# Patient Record
Sex: FEMALE | Race: WHITE | NOT HISPANIC OR LATINO | Employment: OTHER | URBAN - METROPOLITAN AREA
[De-identification: names, ages, dates, MRNs, and addresses within clinical notes are randomized per-mention and may not be internally consistent; named-entity substitution may affect disease eponyms.]

---

## 2017-01-24 ENCOUNTER — OFFICE VISIT - HEALTHEAST (OUTPATIENT)
Dept: INTERNAL MEDICINE | Facility: CLINIC | Age: 67
End: 2017-01-24

## 2017-01-24 DIAGNOSIS — E78.00 PURE HYPERCHOLESTEROLEMIA: ICD-10-CM

## 2017-01-24 DIAGNOSIS — S43.429A: ICD-10-CM

## 2017-01-24 DIAGNOSIS — G45.9 TRANSIENT CEREBRAL ISCHEMIA: ICD-10-CM

## 2017-01-24 LAB
CHOLEST SERPL-MCNC: 229 MG/DL
FASTING STATUS PATIENT QL REPORTED: NO
HDLC SERPL-MCNC: 81 MG/DL
LDLC SERPL CALC-MCNC: 133 MG/DL
TRIGL SERPL-MCNC: 77 MG/DL

## 2017-01-27 ENCOUNTER — OFFICE VISIT - HEALTHEAST (OUTPATIENT)
Dept: PHYSICAL THERAPY | Facility: REHABILITATION | Age: 67
End: 2017-01-27

## 2017-01-27 DIAGNOSIS — G89.29 CHRONIC LEFT SHOULDER PAIN: ICD-10-CM

## 2017-01-27 DIAGNOSIS — R29.3 POOR POSTURE: ICD-10-CM

## 2017-01-27 DIAGNOSIS — R29.898 SHOULDER WEAKNESS: ICD-10-CM

## 2017-01-27 DIAGNOSIS — M25.512 CHRONIC LEFT SHOULDER PAIN: ICD-10-CM

## 2017-01-27 DIAGNOSIS — M25.512 LEFT SHOULDER PAIN: ICD-10-CM

## 2017-01-30 ENCOUNTER — COMMUNICATION - HEALTHEAST (OUTPATIENT)
Dept: INTERNAL MEDICINE | Facility: CLINIC | Age: 67
End: 2017-01-30

## 2017-02-07 ENCOUNTER — OFFICE VISIT - HEALTHEAST (OUTPATIENT)
Dept: PHYSICAL THERAPY | Facility: REHABILITATION | Age: 67
End: 2017-02-07

## 2017-02-07 DIAGNOSIS — R29.3 POOR POSTURE: ICD-10-CM

## 2017-02-07 DIAGNOSIS — G89.29 CHRONIC LEFT SHOULDER PAIN: ICD-10-CM

## 2017-02-07 DIAGNOSIS — M25.512 LEFT SHOULDER PAIN: ICD-10-CM

## 2017-02-07 DIAGNOSIS — M25.512 CHRONIC LEFT SHOULDER PAIN: ICD-10-CM

## 2017-02-07 DIAGNOSIS — R29.898 SHOULDER WEAKNESS: ICD-10-CM

## 2017-02-07 DIAGNOSIS — M25.512 ACUTE PAIN OF LEFT SHOULDER: ICD-10-CM

## 2017-03-16 ENCOUNTER — OFFICE VISIT - HEALTHEAST (OUTPATIENT)
Dept: INTERNAL MEDICINE | Facility: CLINIC | Age: 67
End: 2017-03-16

## 2017-03-16 DIAGNOSIS — D75.89 MACROCYTOSIS WITHOUT ANEMIA: ICD-10-CM

## 2017-03-16 DIAGNOSIS — E78.00 PURE HYPERCHOLESTEROLEMIA: ICD-10-CM

## 2017-03-16 DIAGNOSIS — Z00.00 ROUTINE GENERAL MEDICAL EXAMINATION AT A HEALTH CARE FACILITY: ICD-10-CM

## 2017-03-16 DIAGNOSIS — G45.9 TRANSIENT CEREBRAL ISCHEMIA: ICD-10-CM

## 2017-03-16 ASSESSMENT — MIFFLIN-ST. JEOR: SCORE: 1279.48

## 2017-03-17 LAB
BASOPHILS # BLD AUTO: 0 THOU/UL (ref 0–0.2)
BASOPHILS NFR BLD AUTO: 1 % (ref 0–2)
EOSINOPHIL # BLD AUTO: 0.1 THOU/UL (ref 0–0.4)
EOSINOPHIL NFR BLD AUTO: 1 % (ref 0–6)
ERYTHROCYTE [DISTWIDTH] IN BLOOD BY AUTOMATED COUNT: 13.2 % (ref 11–14.5)
HCT VFR BLD AUTO: 42.9 % (ref 35–47)
HGB BLD-MCNC: 14 G/DL (ref 12–16)
LAB AP CHARGES (HE HISTORICAL CONVERSION): NORMAL
LYMPHOCYTES # BLD AUTO: 1.5 THOU/UL (ref 0.8–4.4)
LYMPHOCYTES NFR BLD AUTO: 25 % (ref 20–40)
MCH RBC QN AUTO: 34.8 PG (ref 27–34)
MCHC RBC AUTO-ENTMCNC: 32.6 G/DL (ref 32–36)
MCV RBC AUTO: 107 FL (ref 80–100)
MONOCYTES # BLD AUTO: 0.6 THOU/UL (ref 0–0.9)
MONOCYTES NFR BLD AUTO: 10 % (ref 2–10)
NEUTROPHILS # BLD AUTO: 3.7 THOU/UL (ref 2–7.7)
NEUTROPHILS NFR BLD AUTO: 63 % (ref 50–70)
PATH REPORT.COMMENTS IMP SPEC: NORMAL
PATH REPORT.COMMENTS IMP SPEC: NORMAL
PATH REPORT.FINAL DX SPEC: NORMAL
PATH REPORT.MICROSCOPIC SPEC OTHER STN: ABNORMAL
PATH REPORT.MICROSCOPIC SPEC OTHER STN: NORMAL
PATH REPORT.RELEVANT HX SPEC: NORMAL
PLATELET # BLD AUTO: 205 THOU/UL (ref 140–440)
PMV BLD AUTO: 11.6 FL (ref 8.5–12.5)
RBC # BLD AUTO: 4.02 MILL/UL (ref 3.8–5.4)
WBC: 5.9 THOU/UL (ref 4–11)

## 2017-03-20 LAB
ALBUMIN PERCENT: 63.6 % (ref 51–67)
ALBUMIN SERPL ELPH-MCNC: 5.1 G/DL (ref 3.2–4.7)
ALPHA 1 PERCENT: 2.1 % (ref 2–4)
ALPHA 2 PERCENT: 10.7 % (ref 5–13)
ALPHA1 GLOB SERPL ELPH-MCNC: 0.2 G/DL (ref 0.1–0.3)
ALPHA2 GLOB SERPL ELPH-MCNC: 0.9 G/DL (ref 0.4–0.9)
B-GLOBULIN SERPL ELPH-MCNC: 0.9 G/DL (ref 0.7–1.2)
BETA PERCENT: 11.2 % (ref 10–17)
GAMMA GLOB SERPL ELPH-MCNC: 1 G/DL (ref 0.6–1.4)
GAMMA GLOBULIN PERCENT: 12.4 % (ref 9–20)
PATH ICD:: ABNORMAL
PATH ICD:: NORMAL
PROT PATTERN SERPL ELPH-IMP: ABNORMAL
PROT PATTERN SERPL IFE-IMP: NORMAL
PROT SERPL-MCNC: 8 G/DL (ref 6–8)
REVIEWING PATHOLOGIST: ABNORMAL
REVIEWING PATHOLOGIST: NORMAL

## 2017-03-27 ENCOUNTER — COMMUNICATION - HEALTHEAST (OUTPATIENT)
Dept: ONCOLOGY | Facility: HOSPITAL | Age: 67
End: 2017-03-27

## 2017-03-27 ENCOUNTER — AMBULATORY - HEALTHEAST (OUTPATIENT)
Dept: INTERNAL MEDICINE | Facility: CLINIC | Age: 67
End: 2017-03-27

## 2017-03-27 DIAGNOSIS — D75.89 MACROCYTOSIS: ICD-10-CM

## 2017-04-04 ENCOUNTER — OFFICE VISIT - HEALTHEAST (OUTPATIENT)
Dept: ONCOLOGY | Facility: CLINIC | Age: 67
End: 2017-04-04

## 2017-04-04 DIAGNOSIS — D75.89 MACROCYTOSIS WITHOUT ANEMIA: ICD-10-CM

## 2017-04-04 DIAGNOSIS — R71.8 OTHER ABNORMALITY OF RED BLOOD CELLS: ICD-10-CM

## 2017-04-04 DIAGNOSIS — E03.9 HYPOTHYROIDISM, UNSPECIFIED TYPE: ICD-10-CM

## 2017-04-04 ASSESSMENT — MIFFLIN-ST. JEOR: SCORE: 1279.48

## 2017-04-06 LAB
KAPPA FREE LIGHT CHAIN, S - HISTORICAL: 1.75 MG/DL (ref 0.33–1.94)
KAPPA/LAMBDA FLC RATIO - HISTORICAL: 1.32 (ref 0.26–1.65)
LAMBDA FREE LIGHT CHAIN, S - HISTORICAL: 1.33 MG/DL (ref 0.57–2.63)

## 2017-04-12 ENCOUNTER — COMMUNICATION - HEALTHEAST (OUTPATIENT)
Dept: ONCOLOGY | Facility: CLINIC | Age: 67
End: 2017-04-12

## 2017-06-20 ENCOUNTER — RECORDS - HEALTHEAST (OUTPATIENT)
Dept: ADMINISTRATIVE | Facility: OTHER | Age: 67
End: 2017-06-20

## 2017-08-22 ENCOUNTER — RECORDS - HEALTHEAST (OUTPATIENT)
Dept: ADMINISTRATIVE | Facility: OTHER | Age: 67
End: 2017-08-22

## 2017-09-07 ENCOUNTER — RECORDS - HEALTHEAST (OUTPATIENT)
Dept: BONE DENSITY | Facility: CLINIC | Age: 67
End: 2017-09-07

## 2017-09-07 ENCOUNTER — RECORDS - HEALTHEAST (OUTPATIENT)
Dept: ADMINISTRATIVE | Facility: OTHER | Age: 67
End: 2017-09-07

## 2017-09-07 DIAGNOSIS — M85.80 OTHER SPECIFIED DISORDERS OF BONE DENSITY AND STRUCTURE, UNSPECIFIED SITE: ICD-10-CM

## 2017-09-07 DIAGNOSIS — Z78.0 ASYMPTOMATIC MENOPAUSAL STATE: ICD-10-CM

## 2017-09-24 ENCOUNTER — RECORDS - HEALTHEAST (OUTPATIENT)
Dept: ADMINISTRATIVE | Facility: OTHER | Age: 67
End: 2017-09-24

## 2017-11-07 ENCOUNTER — RECORDS - HEALTHEAST (OUTPATIENT)
Dept: ADMINISTRATIVE | Facility: OTHER | Age: 67
End: 2017-11-07

## 2018-02-01 ENCOUNTER — AMBULATORY - HEALTHEAST (OUTPATIENT)
Dept: NURSING | Facility: CLINIC | Age: 68
End: 2018-02-01

## 2018-05-17 ENCOUNTER — COMMUNICATION - HEALTHEAST (OUTPATIENT)
Dept: INTERNAL MEDICINE | Facility: CLINIC | Age: 68
End: 2018-05-17

## 2018-06-05 ENCOUNTER — COMMUNICATION - HEALTHEAST (OUTPATIENT)
Dept: INTERNAL MEDICINE | Facility: CLINIC | Age: 68
End: 2018-06-05

## 2018-07-05 ENCOUNTER — OFFICE VISIT - HEALTHEAST (OUTPATIENT)
Dept: INTERNAL MEDICINE | Facility: CLINIC | Age: 68
End: 2018-07-05

## 2018-07-05 DIAGNOSIS — E78.00 PURE HYPERCHOLESTEROLEMIA: ICD-10-CM

## 2018-07-05 DIAGNOSIS — G45.9 TRANSIENT CEREBRAL ISCHEMIA: ICD-10-CM

## 2018-07-05 DIAGNOSIS — Z00.00 ROUTINE GENERAL MEDICAL EXAMINATION AT A HEALTH CARE FACILITY: ICD-10-CM

## 2018-07-05 DIAGNOSIS — R03.0 ELEVATED BLOOD PRESSURE READING WITHOUT DIAGNOSIS OF HYPERTENSION: ICD-10-CM

## 2018-07-05 DIAGNOSIS — D75.89 MACROCYTOSIS WITHOUT ANEMIA: ICD-10-CM

## 2018-07-05 LAB
ALBUMIN SERPL-MCNC: 4.3 G/DL (ref 3.5–5)
ALP SERPL-CCNC: 56 U/L (ref 45–120)
ALT SERPL W P-5'-P-CCNC: 23 U/L (ref 0–45)
ANION GAP SERPL CALCULATED.3IONS-SCNC: 10 MMOL/L (ref 5–18)
AST SERPL W P-5'-P-CCNC: 31 U/L (ref 0–40)
BILIRUB SERPL-MCNC: 0.8 MG/DL (ref 0–1)
BUN SERPL-MCNC: 13 MG/DL (ref 8–28)
CALCIUM SERPL-MCNC: 9.7 MG/DL (ref 8.5–10.5)
CHLORIDE BLD-SCNC: 106 MMOL/L (ref 98–107)
CHOLEST SERPL-MCNC: 190 MG/DL
CO2 SERPL-SCNC: 27 MMOL/L (ref 22–31)
CREAT SERPL-MCNC: 0.7 MG/DL (ref 0.6–1.1)
ERYTHROCYTE [DISTWIDTH] IN BLOOD BY AUTOMATED COUNT: 11.3 % (ref 11–14.5)
FASTING STATUS PATIENT QL REPORTED: YES
GFR SERPL CREATININE-BSD FRML MDRD: >60 ML/MIN/1.73M2
GLUCOSE BLD-MCNC: 105 MG/DL (ref 70–125)
HCT VFR BLD AUTO: 43.4 % (ref 35–47)
HDLC SERPL-MCNC: 71 MG/DL
HGB BLD-MCNC: 14.2 G/DL (ref 12–16)
LDLC SERPL CALC-MCNC: 102 MG/DL
MCH RBC QN AUTO: 33.7 PG (ref 27–34)
MCHC RBC AUTO-ENTMCNC: 32.7 G/DL (ref 32–36)
MCV RBC AUTO: 103 FL (ref 80–100)
PLATELET # BLD AUTO: 185 THOU/UL (ref 140–440)
PMV BLD AUTO: 8.5 FL (ref 7–10)
POTASSIUM BLD-SCNC: 4.2 MMOL/L (ref 3.5–5)
PROT SERPL-MCNC: 7.6 G/DL (ref 6–8)
RBC # BLD AUTO: 4.22 MILL/UL (ref 3.8–5.4)
SODIUM SERPL-SCNC: 143 MMOL/L (ref 136–145)
TRIGL SERPL-MCNC: 84 MG/DL
TSH SERPL DL<=0.005 MIU/L-ACNC: 2 UIU/ML (ref 0.3–5)
WBC: 6.2 THOU/UL (ref 4–11)

## 2018-07-05 ASSESSMENT — MIFFLIN-ST. JEOR: SCORE: 1300.46

## 2018-07-09 ENCOUNTER — COMMUNICATION - HEALTHEAST (OUTPATIENT)
Dept: INTERNAL MEDICINE | Facility: CLINIC | Age: 68
End: 2018-07-09

## 2018-09-11 ENCOUNTER — OFFICE VISIT - HEALTHEAST (OUTPATIENT)
Dept: INTERNAL MEDICINE | Facility: CLINIC | Age: 68
End: 2018-09-11

## 2018-09-11 DIAGNOSIS — I10 HTN (HYPERTENSION): ICD-10-CM

## 2018-09-27 ENCOUNTER — OFFICE VISIT - HEALTHEAST (OUTPATIENT)
Dept: INTERNAL MEDICINE | Facility: CLINIC | Age: 68
End: 2018-09-27

## 2018-09-27 DIAGNOSIS — I10 HTN (HYPERTENSION): ICD-10-CM

## 2018-09-27 LAB
ANION GAP SERPL CALCULATED.3IONS-SCNC: 12 MMOL/L (ref 5–18)
BUN SERPL-MCNC: 13 MG/DL (ref 8–28)
CALCIUM SERPL-MCNC: 10.8 MG/DL (ref 8.5–10.5)
CHLORIDE BLD-SCNC: 96 MMOL/L (ref 98–107)
CO2 SERPL-SCNC: 32 MMOL/L (ref 22–31)
CREAT SERPL-MCNC: 0.78 MG/DL (ref 0.6–1.1)
GFR SERPL CREATININE-BSD FRML MDRD: >60 ML/MIN/1.73M2
GLUCOSE BLD-MCNC: 108 MG/DL (ref 70–125)
POTASSIUM BLD-SCNC: 3.3 MMOL/L (ref 3.5–5)
SODIUM SERPL-SCNC: 140 MMOL/L (ref 136–145)

## 2018-09-27 ASSESSMENT — MIFFLIN-ST. JEOR: SCORE: 1272.34

## 2018-09-28 ENCOUNTER — COMMUNICATION - HEALTHEAST (OUTPATIENT)
Dept: INTERNAL MEDICINE | Facility: CLINIC | Age: 68
End: 2018-09-28

## 2018-10-09 ENCOUNTER — AMBULATORY - HEALTHEAST (OUTPATIENT)
Dept: INTERNAL MEDICINE | Facility: CLINIC | Age: 68
End: 2018-10-09

## 2018-10-09 ENCOUNTER — COMMUNICATION - HEALTHEAST (OUTPATIENT)
Dept: LAB | Facility: CLINIC | Age: 68
End: 2018-10-09

## 2018-10-09 DIAGNOSIS — I10 HTN (HYPERTENSION): ICD-10-CM

## 2018-10-16 ENCOUNTER — OFFICE VISIT - HEALTHEAST (OUTPATIENT)
Dept: INTERNAL MEDICINE | Facility: CLINIC | Age: 68
End: 2018-10-16

## 2018-10-16 ENCOUNTER — AMBULATORY - HEALTHEAST (OUTPATIENT)
Dept: NURSING | Facility: CLINIC | Age: 68
End: 2018-10-16

## 2018-10-16 DIAGNOSIS — I10 HTN (HYPERTENSION): ICD-10-CM

## 2018-10-16 LAB
ANION GAP SERPL CALCULATED.3IONS-SCNC: 10 MMOL/L (ref 5–18)
BUN SERPL-MCNC: 13 MG/DL (ref 8–28)
CALCIUM SERPL-MCNC: 10.1 MG/DL (ref 8.5–10.5)
CHLORIDE BLD-SCNC: 103 MMOL/L (ref 98–107)
CO2 SERPL-SCNC: 28 MMOL/L (ref 22–31)
CREAT SERPL-MCNC: 0.79 MG/DL (ref 0.6–1.1)
GFR SERPL CREATININE-BSD FRML MDRD: >60 ML/MIN/1.73M2
GLUCOSE BLD-MCNC: 90 MG/DL (ref 70–125)
POTASSIUM BLD-SCNC: 4.2 MMOL/L (ref 3.5–5)
SODIUM SERPL-SCNC: 141 MMOL/L (ref 136–145)

## 2018-10-30 ENCOUNTER — OFFICE VISIT - HEALTHEAST (OUTPATIENT)
Dept: INTERNAL MEDICINE | Facility: CLINIC | Age: 68
End: 2018-10-30

## 2018-10-30 DIAGNOSIS — I10 HTN (HYPERTENSION): ICD-10-CM

## 2018-11-13 ENCOUNTER — OFFICE VISIT - HEALTHEAST (OUTPATIENT)
Dept: INTERNAL MEDICINE | Facility: CLINIC | Age: 68
End: 2018-11-13

## 2018-11-13 DIAGNOSIS — I10 ESSENTIAL HYPERTENSION: ICD-10-CM

## 2018-12-12 ENCOUNTER — COMMUNICATION - HEALTHEAST (OUTPATIENT)
Dept: INTERNAL MEDICINE | Facility: CLINIC | Age: 68
End: 2018-12-12

## 2018-12-12 ENCOUNTER — OFFICE VISIT - HEALTHEAST (OUTPATIENT)
Dept: INTERNAL MEDICINE | Facility: CLINIC | Age: 68
End: 2018-12-12

## 2018-12-12 DIAGNOSIS — I10 BENIGN ESSENTIAL HYPERTENSION: ICD-10-CM

## 2018-12-12 LAB
ANION GAP SERPL CALCULATED.3IONS-SCNC: 9 MMOL/L (ref 5–18)
BUN SERPL-MCNC: 18 MG/DL (ref 8–28)
CALCIUM SERPL-MCNC: 10.4 MG/DL (ref 8.5–10.5)
CHLORIDE BLD-SCNC: 105 MMOL/L (ref 98–107)
CO2 SERPL-SCNC: 29 MMOL/L (ref 22–31)
CREAT SERPL-MCNC: 0.81 MG/DL (ref 0.6–1.1)
GFR SERPL CREATININE-BSD FRML MDRD: >60 ML/MIN/1.73M2
GLUCOSE BLD-MCNC: 104 MG/DL (ref 70–125)
POTASSIUM BLD-SCNC: 4.3 MMOL/L (ref 3.5–5)
SODIUM SERPL-SCNC: 143 MMOL/L (ref 136–145)

## 2019-03-07 ENCOUNTER — OFFICE VISIT - HEALTHEAST (OUTPATIENT)
Dept: INTERNAL MEDICINE | Facility: CLINIC | Age: 69
End: 2019-03-07

## 2019-03-07 DIAGNOSIS — I10 ESSENTIAL HYPERTENSION: ICD-10-CM

## 2019-04-30 ENCOUNTER — COMMUNICATION - HEALTHEAST (OUTPATIENT)
Dept: INTERNAL MEDICINE | Facility: CLINIC | Age: 69
End: 2019-04-30

## 2019-04-30 DIAGNOSIS — I10 ESSENTIAL HYPERTENSION: ICD-10-CM

## 2019-05-13 ENCOUNTER — OFFICE VISIT - HEALTHEAST (OUTPATIENT)
Dept: INTERNAL MEDICINE | Facility: CLINIC | Age: 69
End: 2019-05-13

## 2019-05-13 DIAGNOSIS — R05.9 COUGH: ICD-10-CM

## 2019-05-13 DIAGNOSIS — F32.A DEPRESSION, UNSPECIFIED DEPRESSION TYPE: ICD-10-CM

## 2019-05-13 DIAGNOSIS — Z12.31 VISIT FOR SCREENING MAMMOGRAM: ICD-10-CM

## 2019-05-13 DIAGNOSIS — I10 ESSENTIAL HYPERTENSION: ICD-10-CM

## 2019-06-01 ENCOUNTER — AMBULATORY - HEALTHEAST (OUTPATIENT)
Dept: MULTI SPECIALTY CLINIC | Facility: CLINIC | Age: 69
End: 2019-06-01

## 2019-07-01 ENCOUNTER — COMMUNICATION - HEALTHEAST (OUTPATIENT)
Dept: INTERNAL MEDICINE | Facility: CLINIC | Age: 69
End: 2019-07-01

## 2019-07-01 DIAGNOSIS — E78.00 PURE HYPERCHOLESTEROLEMIA: ICD-10-CM

## 2019-07-16 ENCOUNTER — COMMUNICATION - HEALTHEAST (OUTPATIENT)
Dept: TELEHEALTH | Facility: CLINIC | Age: 69
End: 2019-07-16

## 2019-07-16 ENCOUNTER — OFFICE VISIT - HEALTHEAST (OUTPATIENT)
Dept: INTERNAL MEDICINE | Facility: CLINIC | Age: 69
End: 2019-07-16

## 2019-07-16 DIAGNOSIS — R45.89 DEPRESSED MOOD: ICD-10-CM

## 2019-07-16 DIAGNOSIS — I10 ESSENTIAL HYPERTENSION: ICD-10-CM

## 2019-07-16 LAB
ANION GAP SERPL CALCULATED.3IONS-SCNC: 6 MMOL/L (ref 5–18)
BUN SERPL-MCNC: 15 MG/DL (ref 8–28)
CALCIUM SERPL-MCNC: 10.2 MG/DL (ref 8.5–10.5)
CHLORIDE BLD-SCNC: 105 MMOL/L (ref 98–107)
CO2 SERPL-SCNC: 30 MMOL/L (ref 22–31)
CREAT SERPL-MCNC: 0.83 MG/DL (ref 0.6–1.1)
GFR SERPL CREATININE-BSD FRML MDRD: >60 ML/MIN/1.73M2
GLUCOSE BLD-MCNC: 102 MG/DL (ref 70–125)
POTASSIUM BLD-SCNC: 4.4 MMOL/L (ref 3.5–5)
SODIUM SERPL-SCNC: 141 MMOL/L (ref 136–145)

## 2019-07-17 ENCOUNTER — COMMUNICATION - HEALTHEAST (OUTPATIENT)
Dept: INTERNAL MEDICINE | Facility: CLINIC | Age: 69
End: 2019-07-17

## 2019-08-11 ENCOUNTER — COMMUNICATION - HEALTHEAST (OUTPATIENT)
Dept: INTERNAL MEDICINE | Facility: CLINIC | Age: 69
End: 2019-08-11

## 2019-08-11 DIAGNOSIS — I10 ESSENTIAL HYPERTENSION: ICD-10-CM

## 2019-09-12 ENCOUNTER — COMMUNICATION - HEALTHEAST (OUTPATIENT)
Dept: INTERNAL MEDICINE | Facility: CLINIC | Age: 69
End: 2019-09-12

## 2019-09-12 DIAGNOSIS — I10 ESSENTIAL HYPERTENSION: ICD-10-CM

## 2019-09-24 ENCOUNTER — COMMUNICATION - HEALTHEAST (OUTPATIENT)
Dept: INTERNAL MEDICINE | Facility: CLINIC | Age: 69
End: 2019-09-24

## 2019-09-24 DIAGNOSIS — E78.00 PURE HYPERCHOLESTEROLEMIA: ICD-10-CM

## 2019-11-07 ENCOUNTER — RECORDS - HEALTHEAST (OUTPATIENT)
Dept: ADMINISTRATIVE | Facility: OTHER | Age: 69
End: 2019-11-07

## 2019-11-19 ENCOUNTER — OFFICE VISIT - HEALTHEAST (OUTPATIENT)
Dept: INTERNAL MEDICINE | Facility: CLINIC | Age: 69
End: 2019-11-19

## 2019-11-19 DIAGNOSIS — Z51.81 ENCOUNTER FOR THERAPEUTIC DRUG MONITORING: ICD-10-CM

## 2019-11-19 DIAGNOSIS — I10 ESSENTIAL HYPERTENSION: ICD-10-CM

## 2019-11-19 DIAGNOSIS — Z23 NEED FOR IMMUNIZATION AGAINST INFLUENZA: ICD-10-CM

## 2019-11-19 DIAGNOSIS — I65.21 ASYMPTOMATIC CAROTID ARTERY STENOSIS, RIGHT: ICD-10-CM

## 2019-11-19 DIAGNOSIS — D75.89 MACROCYTOSIS WITHOUT ANEMIA: ICD-10-CM

## 2019-11-19 DIAGNOSIS — E78.00 PURE HYPERCHOLESTEROLEMIA: ICD-10-CM

## 2019-11-19 LAB
ALBUMIN SERPL-MCNC: 4.4 G/DL (ref 3.5–5)
ALP SERPL-CCNC: 66 U/L (ref 45–120)
ALT SERPL W P-5'-P-CCNC: 29 U/L (ref 0–45)
ANION GAP SERPL CALCULATED.3IONS-SCNC: 9 MMOL/L (ref 5–18)
AST SERPL W P-5'-P-CCNC: 29 U/L (ref 0–40)
BASOPHILS # BLD AUTO: 0 THOU/UL (ref 0–0.2)
BASOPHILS NFR BLD AUTO: 0 % (ref 0–2)
BILIRUB SERPL-MCNC: 0.5 MG/DL (ref 0–1)
BUN SERPL-MCNC: 17 MG/DL (ref 8–28)
CALCIUM SERPL-MCNC: 10.4 MG/DL (ref 8.5–10.5)
CHLORIDE BLD-SCNC: 103 MMOL/L (ref 98–107)
CHOLEST SERPL-MCNC: 202 MG/DL
CO2 SERPL-SCNC: 29 MMOL/L (ref 22–31)
CREAT SERPL-MCNC: 0.84 MG/DL (ref 0.6–1.1)
EOSINOPHIL # BLD AUTO: 0.2 THOU/UL (ref 0–0.4)
EOSINOPHIL NFR BLD AUTO: 2 % (ref 0–6)
ERYTHROCYTE [DISTWIDTH] IN BLOOD BY AUTOMATED COUNT: 11.3 % (ref 11–14.5)
FASTING STATUS PATIENT QL REPORTED: NO
GFR SERPL CREATININE-BSD FRML MDRD: >60 ML/MIN/1.73M2
GLUCOSE BLD-MCNC: 103 MG/DL (ref 70–125)
HCT VFR BLD AUTO: 41 % (ref 35–47)
HDLC SERPL-MCNC: 74 MG/DL
HGB BLD-MCNC: 13.5 G/DL (ref 12–16)
LDLC SERPL CALC-MCNC: 103 MG/DL
LYMPHOCYTES # BLD AUTO: 1.8 THOU/UL (ref 0.8–4.4)
LYMPHOCYTES NFR BLD AUTO: 25 % (ref 20–40)
MCH RBC QN AUTO: 34 PG (ref 27–34)
MCHC RBC AUTO-ENTMCNC: 33 G/DL (ref 32–36)
MCV RBC AUTO: 103 FL (ref 80–100)
MONOCYTES # BLD AUTO: 0.7 THOU/UL (ref 0–0.9)
MONOCYTES NFR BLD AUTO: 9 % (ref 2–10)
NEUTROPHILS # BLD AUTO: 4.7 THOU/UL (ref 2–7.7)
NEUTROPHILS NFR BLD AUTO: 63 % (ref 50–70)
PLATELET # BLD AUTO: 225 THOU/UL (ref 140–440)
PMV BLD AUTO: 7.9 FL (ref 7–10)
POTASSIUM BLD-SCNC: 4.2 MMOL/L (ref 3.5–5)
PROT SERPL-MCNC: 7.7 G/DL (ref 6–8)
RBC # BLD AUTO: 3.98 MILL/UL (ref 3.8–5.4)
SODIUM SERPL-SCNC: 141 MMOL/L (ref 136–145)
TRIGL SERPL-MCNC: 127 MG/DL
WBC: 7.4 THOU/UL (ref 4–11)

## 2019-11-20 ENCOUNTER — COMMUNICATION - HEALTHEAST (OUTPATIENT)
Dept: INTERNAL MEDICINE | Facility: CLINIC | Age: 69
End: 2019-11-20

## 2019-12-04 ENCOUNTER — COMMUNICATION - HEALTHEAST (OUTPATIENT)
Dept: INTERNAL MEDICINE | Facility: CLINIC | Age: 69
End: 2019-12-04

## 2019-12-04 ENCOUNTER — HOSPITAL ENCOUNTER (OUTPATIENT)
Dept: ULTRASOUND IMAGING | Facility: CLINIC | Age: 69
Discharge: HOME | End: 2019-12-04
Attending: INTERNAL MEDICINE

## 2019-12-04 ENCOUNTER — HOSPITAL ENCOUNTER (OUTPATIENT)
Dept: CT IMAGING | Facility: CLINIC | Age: 69
Discharge: HOME | End: 2019-12-04
Attending: INTERNAL MEDICINE

## 2019-12-04 DIAGNOSIS — E78.00 PURE HYPERCHOLESTEROLEMIA: ICD-10-CM

## 2019-12-04 DIAGNOSIS — I65.21 ASYMPTOMATIC CAROTID ARTERY STENOSIS, RIGHT: ICD-10-CM

## 2019-12-04 LAB
CV CALCIUM SCORE AGATSTON LM: 0
CV CALCIUM SCORING AGATSON LAD: 198
CV CALCIUM SCORING AGATSTON CX: 57
CV CALCIUM SCORING AGATSTON RCA: 0
CV CALCIUM SCORING AGATSTON TOTAL: 255

## 2020-01-02 ENCOUNTER — COMMUNICATION - HEALTHEAST (OUTPATIENT)
Dept: INTERNAL MEDICINE | Facility: CLINIC | Age: 70
End: 2020-01-02

## 2020-01-02 DIAGNOSIS — E78.00 PURE HYPERCHOLESTEROLEMIA: ICD-10-CM

## 2020-01-23 ENCOUNTER — COMMUNICATION - HEALTHEAST (OUTPATIENT)
Dept: INTERNAL MEDICINE | Facility: CLINIC | Age: 70
End: 2020-01-23

## 2020-01-23 DIAGNOSIS — I10 ESSENTIAL HYPERTENSION: ICD-10-CM

## 2020-09-04 ENCOUNTER — COMMUNICATION - HEALTHEAST (OUTPATIENT)
Dept: INTERNAL MEDICINE | Facility: CLINIC | Age: 70
End: 2020-09-04

## 2020-09-04 DIAGNOSIS — I10 ESSENTIAL HYPERTENSION: ICD-10-CM

## 2020-10-02 ENCOUNTER — COMMUNICATION - HEALTHEAST (OUTPATIENT)
Dept: INTERNAL MEDICINE | Facility: CLINIC | Age: 70
End: 2020-10-02

## 2020-10-02 DIAGNOSIS — I10 ESSENTIAL HYPERTENSION: ICD-10-CM

## 2020-12-30 ENCOUNTER — COMMUNICATION - HEALTHEAST (OUTPATIENT)
Dept: INTERNAL MEDICINE | Facility: CLINIC | Age: 70
End: 2020-12-30

## 2020-12-30 DIAGNOSIS — E78.00 PURE HYPERCHOLESTEROLEMIA: ICD-10-CM

## 2021-01-05 ENCOUNTER — COMMUNICATION - HEALTHEAST (OUTPATIENT)
Dept: INTERNAL MEDICINE | Facility: CLINIC | Age: 71
End: 2021-01-05

## 2021-01-05 ENCOUNTER — OFFICE VISIT - HEALTHEAST (OUTPATIENT)
Dept: FAMILY MEDICINE | Facility: CLINIC | Age: 71
End: 2021-01-05

## 2021-01-05 ENCOUNTER — COMMUNICATION - HEALTHEAST (OUTPATIENT)
Dept: FAMILY MEDICINE | Facility: CLINIC | Age: 71
End: 2021-01-05

## 2021-01-05 DIAGNOSIS — I10 ESSENTIAL HYPERTENSION: ICD-10-CM

## 2021-01-05 DIAGNOSIS — Z23 ENCOUNTER FOR IMMUNIZATION: ICD-10-CM

## 2021-01-05 DIAGNOSIS — E78.00 PURE HYPERCHOLESTEROLEMIA: ICD-10-CM

## 2021-01-05 LAB
ANION GAP SERPL CALCULATED.3IONS-SCNC: 11 MMOL/L (ref 5–18)
BUN SERPL-MCNC: 19 MG/DL (ref 8–28)
CALCIUM SERPL-MCNC: 10.1 MG/DL (ref 8.5–10.5)
CHLORIDE BLD-SCNC: 103 MMOL/L (ref 98–107)
CHOLEST SERPL-MCNC: 204 MG/DL
CO2 SERPL-SCNC: 29 MMOL/L (ref 22–31)
CREAT SERPL-MCNC: 0.81 MG/DL (ref 0.6–1.1)
ERYTHROCYTE [DISTWIDTH] IN BLOOD BY AUTOMATED COUNT: 10.6 % (ref 11–14.5)
FASTING STATUS PATIENT QL REPORTED: YES
GFR SERPL CREATININE-BSD FRML MDRD: >60 ML/MIN/1.73M2
GLUCOSE BLD-MCNC: 100 MG/DL (ref 70–125)
HCT VFR BLD AUTO: 42.6 % (ref 35–47)
HDLC SERPL-MCNC: 77 MG/DL
HGB BLD-MCNC: 14.1 G/DL (ref 12–16)
LDLC SERPL CALC-MCNC: 100 MG/DL
MCH RBC QN AUTO: 34.3 PG (ref 27–34)
MCHC RBC AUTO-ENTMCNC: 33.1 G/DL (ref 32–36)
MCV RBC AUTO: 104 FL (ref 80–100)
PLATELET # BLD AUTO: 220 THOU/UL (ref 140–440)
PMV BLD AUTO: 8.1 FL (ref 7–10)
POTASSIUM BLD-SCNC: 4.2 MMOL/L (ref 3.5–5)
RBC # BLD AUTO: 4.11 MILL/UL (ref 3.8–5.4)
SODIUM SERPL-SCNC: 143 MMOL/L (ref 136–145)
TRIGL SERPL-MCNC: 134 MG/DL
WBC: 6.3 THOU/UL (ref 4–11)

## 2021-01-05 ASSESSMENT — MIFFLIN-ST. JEOR: SCORE: 1327.11

## 2021-01-26 ENCOUNTER — OFFICE VISIT - HEALTHEAST (OUTPATIENT)
Dept: FAMILY MEDICINE | Facility: CLINIC | Age: 71
End: 2021-01-26

## 2021-01-26 DIAGNOSIS — I10 ESSENTIAL HYPERTENSION: ICD-10-CM

## 2021-01-26 ASSESSMENT — MIFFLIN-ST. JEOR: SCORE: 1340.26

## 2021-02-14 ENCOUNTER — COMMUNICATION - HEALTHEAST (OUTPATIENT)
Dept: INTERNAL MEDICINE | Facility: CLINIC | Age: 71
End: 2021-02-14

## 2021-02-14 DIAGNOSIS — I10 ESSENTIAL HYPERTENSION: ICD-10-CM

## 2021-03-29 ENCOUNTER — COMMUNICATION - HEALTHEAST (OUTPATIENT)
Dept: FAMILY MEDICINE | Facility: CLINIC | Age: 71
End: 2021-03-29

## 2021-03-29 ENCOUNTER — COMMUNICATION - HEALTHEAST (OUTPATIENT)
Dept: INTERNAL MEDICINE | Facility: CLINIC | Age: 71
End: 2021-03-29

## 2021-03-29 DIAGNOSIS — I10 ESSENTIAL HYPERTENSION: ICD-10-CM

## 2021-03-29 DIAGNOSIS — E78.00 PURE HYPERCHOLESTEROLEMIA: ICD-10-CM

## 2021-03-29 RX ORDER — ATORVASTATIN CALCIUM 80 MG/1
TABLET, FILM COATED ORAL
Qty: 90 TABLET | Refills: 0 | Status: SHIPPED | OUTPATIENT
Start: 2021-03-29 | End: 2021-07-22

## 2021-03-30 RX ORDER — AMLODIPINE BESYLATE 5 MG/1
TABLET ORAL
Qty: 90 TABLET | Refills: 3 | Status: SHIPPED | OUTPATIENT
Start: 2021-03-30 | End: 2022-04-07

## 2021-05-08 ENCOUNTER — COMMUNICATION - HEALTHEAST (OUTPATIENT)
Dept: INTERNAL MEDICINE | Facility: CLINIC | Age: 71
End: 2021-05-08

## 2021-05-08 DIAGNOSIS — I10 ESSENTIAL HYPERTENSION: ICD-10-CM

## 2021-05-13 RX ORDER — LOSARTAN POTASSIUM 100 MG/1
TABLET ORAL
Qty: 90 TABLET | Refills: 0 | Status: SHIPPED | OUTPATIENT
Start: 2021-05-13 | End: 2021-09-07

## 2021-05-25 ENCOUNTER — RECORDS - HEALTHEAST (OUTPATIENT)
Dept: ADMINISTRATIVE | Facility: CLINIC | Age: 71
End: 2021-05-25

## 2021-05-28 NOTE — PATIENT INSTRUCTIONS - HE
Stop taking the lisinopril.  This may be causing your persistent cough.    I sent in a new medication called losartan.  Take 1 tablet daily.    Follow-up with me in 2 weeks for recheck of your blood pressure.  Bring your laptop with you to that appointment.

## 2021-05-28 NOTE — TELEPHONE ENCOUNTER
Former patient of helen Contreras & has not established care with another provider.  Please assign refill request to covering provider per Clinic standard process.      Refill Approved    Rx renewed per Medication Renewal Policy. Medication was last renewed on 3/7/19.    Sirena Cowart, [facility] Triage/Med Refill 5/1/2019     Requested Prescriptions   Pending Prescriptions Disp Refills     amLODIPine (NORVASC) 5 MG tablet [Pharmacy Med Name: AMLODIPINE BESYLATE 5 MG TAB] 30 tablet 1     Sig: TAKE 1 TABLET BY MOUTH EVERY DAY       Calcium-Channel Blockers Protocol Passed - 4/30/2019  1:26 PM        Passed - PCP or prescribing provider visit in past 12 months or next 3 months     Last office visit with prescriber/PCP: 3/7/2019 Helen Contreras MD OR same dept: 3/7/2019 Helen Contreras MD OR same specialty: 3/7/2019 Helen Contreras MD  Last physical: 7/5/2018 Last MTM visit: Visit date not found   Next visit within 3 mo: Visit date not found  Next physical within 3 mo: Visit date not found  Prescriber OR PCP: Helen Contreras MD  Last diagnosis associated with med order: 1. Essential hypertension  - amLODIPine (NORVASC) 5 MG tablet [Pharmacy Med Name: AMLODIPINE BESYLATE 5 MG TAB]; TAKE 1 TABLET BY MOUTH EVERY DAY  Dispense: 30 tablet; Refill: 1    If protocol passes may refill for 12 months if within 3 months of last provider visit (or a total of 15 months).             Passed - Blood pressure filed in past 12 months     BP Readings from Last 1 Encounters:   03/07/19 (!) 167/100

## 2021-05-28 NOTE — PROGRESS NOTES
Clinic Note    Assessment:     Assessment and Plan:  1. Essential hypertension  Blood pressure is 150/80 today.  She is been dealing with a persistent cough that she thinks may be related to her lisinopril.  We will switch her to losartan and have her follow-up with me in 2 weeks for recheck.    2. Cough  Please see #1    3. Visit for screening mammogram  - Mammo Screening Bilateral; Future      4. Depression, unspecified depression type  She is thinking about calling her psychologist who she used 2 years ago.  She had been on depression medications in the past but weaned off.  She wants to hold off her medications for now.  She will follow-up in 2 weeks for recheck.         Patient Instructions   Stop taking the lisinopril.  This may be causing your persistent cough.    I sent in a new medication called losartan.  Take 1 tablet daily.    Follow-up with me in 2 weeks for recheck of your blood pressure.  Bring your laptop with you to that appointment.    Return in about 2 weeks (around 5/27/2019).         Subjective:      Patient comes to clinic today for follow-up of her hypertension.    She last saw her PCP 2 months ago.  She had amlodipine 5 mg added to her regimen.  She is now using lisinopril 40 mg, and amlodipine 5 mg daily.    Today, patient states that this past winter has been difficult for her.  She is been unable to exercise due to a persistent cold like infection that lasted for almost 2 months.  This made it difficult to exercise.  She also fell twice and felt quite sore.  She does not like driving in the wintertime.    Her mood is been down.  She grieves the loss of her  who passed away years ago.  Her niece has also been sending her nasty emails on a regular basis.  These emails are unwarranted and confusing to the patient, who suspects that her niece may be dealing with some mental health issues.    She is developed a cough over this past winter that has not gone away.  At first, she felt ill  but now describes the cough as a tingling sensation in the back of her throat.  Cough seems to be mild during the day but worse at night.  She denies any allergy symptoms.  No postnasal drip.  No GERD-like symptoms.  She wonders if her lisinopril may be playing a role.    The following portions of the patient's history were reviewed and updated as appropriate: Allergies, medications, problem list, prior note.     Review of Systems:    Review is otherwise negative except for what is mentioned above.     Social Hx:    Social History     Tobacco Use   Smoking Status Never Smoker   Smokeless Tobacco Never Used         Objective:     Vitals:    05/13/19 0929   BP: 150/80   Pulse: 70   Weight: 177 lb 9.6 oz (80.6 kg)       Exam:    General: No apparent distress.  Tearful at times.  Calm. Alert and Oriented X3. Pt behavior is appropriate.      Patient Active Problem List   Diagnosis     Transient Ischemic Attack     Macrocytosis without anemia     Hypercholesterolemia     Squamous cell carcinoma     HTN (hypertension)     Current Outpatient Medications   Medication Sig Dispense Refill     amLODIPine (NORVASC) 5 MG tablet TAKE 1 TABLET BY MOUTH EVERY DAY 90 tablet 2     aspirin 81 MG EC tablet Take 1 tablet (81 mg total) by mouth daily.  0     atorvastatin (LIPITOR) 80 MG tablet Take 1 tablet (80 mg total) by mouth at bedtime. 90 tablet 3     calcium carbonate-vitamin D3 600 mg(1,500mg) -400 unit Chew Chew 1 tablet 2 (two) times a day.       cholecalciferol, vitamin D3, 1,000 unit tablet Take 1,000 Units by mouth daily.       CYANOCOBALAMIN, VITAMIN B-12, (VITAMIN B-12 ORAL) Take 1 tablet by mouth daily.       lisinopril (PRINIVIL,ZESTRIL) 40 MG tablet Take 1 tablet (40 mg total) by mouth daily. 90 tablet 2     MULTIVITAMIN ORAL Take 1 tablet by mouth daily.       losartan (COZAAR) 100 MG tablet Take 1 tablet (100 mg total) by mouth daily. 90 tablet 0     No current facility-administered medications for this visit.        I  spent 25 minutes with patient face to face, of which >50% was counseling regarding the above plan       Lamberto Maddox CNP (Rob)    5/13/2019            [Negative] : Heme/Lymph

## 2021-05-30 VITALS — WEIGHT: 168.8 LBS | BODY MASS INDEX: 27.13 KG/M2 | HEIGHT: 66 IN

## 2021-05-30 VITALS — WEIGHT: 170 LBS | BODY MASS INDEX: 27.98 KG/M2

## 2021-05-30 VITALS — WEIGHT: 168.8 LBS | HEIGHT: 66 IN | BODY MASS INDEX: 27.13 KG/M2

## 2021-05-30 NOTE — TELEPHONE ENCOUNTER
Patient was seen recently by Tom 5/13/19. Also has some appointments coming up 7/16/19 and 10/15/19. Routing refill.  Guerline Amaya CMA ............... 3:04 PM, 07/01/19

## 2021-05-30 NOTE — PROGRESS NOTES
"Clinic Note    Assessment:     Assessment and Plan:  1. Essential hypertension  She is responded well to switching to losartan from lisinopril.  Her cough has gone away.  Blood pressure is 132/80 today.  Recheck basic metabolic panel.  - Basic Metabolic Panel    2. Depressed mood  Her mood is improved since I last saw her 2 months ago.  I encouraged her to follow-up with me as needed.       Patient Instructions   The blood pressure and other vital signs look excellent today.    No change in medications for now.    Recheck kidney labs and electrolytes today.    Follow-up with Dr. Lonnie Contreras in October for \"establish care\" visit.    Schedule appointment with me, as needed.    Have a great rest of your summer!    Return in about 3 months (around 10/16/2019).         Subjective:      Patient comes to clinic today for follow-up.    I last saw her 2 months ago.  At that time, we switched her from lisinopril 40 mg, to losartan 100 mg due to a cough thought to be due to her lisinopril medication.    She had been dealing with some depressed mood as a result of her knee sending her nasty emails on a regular basis.  These were unwarranted and confusing to the patient, who suspects that her niece may be dealing with some mental health issues.    Today, patient states that her cough is completely resolved.  Since starting losartan, she is noticed some dryness on her lips that seems to respond well with daily Aquaphor treatments.    Her mood is better now.  She is reading some self-help books.  She purchased some new grooming equipment for her dogs, and groomed them recently which she is happy about.    The following portions of the patient's history were reviewed and updated as appropriate: Allergies, medications, problem list, prior note.     Review of Systems:    Review is otherwise negative except for what is mentioned above.     Social Hx:    Social History     Tobacco Use   Smoking Status Never Smoker   Smokeless Tobacco " Never Used         Objective:     Vitals:    07/16/19 0929   BP: 132/80   Pulse: 68   Weight: 178 lb 4.8 oz (80.9 kg)       Exam:    General: No apparent distress. Calm. Alert and Oriented X3. Pt behavior is appropriate.      Patient Active Problem List   Diagnosis     Transient Ischemic Attack     Macrocytosis without anemia     Hypercholesterolemia     Squamous cell carcinoma     HTN (hypertension)     Current Outpatient Medications   Medication Sig Dispense Refill     amLODIPine (NORVASC) 5 MG tablet TAKE 1 TABLET BY MOUTH EVERY DAY 90 tablet 2     aspirin 81 MG EC tablet Take 1 tablet (81 mg total) by mouth daily.  0     atorvastatin (LIPITOR) 80 MG tablet TAKE 1 TABLET (80 MG TOTAL) BY MOUTH AT BEDTIME. 90 tablet 0     calcium carbonate-vitamin D3 600 mg(1,500mg) -400 unit Chew Chew 1 tablet 2 (two) times a day.       cholecalciferol, vitamin D3, 1,000 unit tablet Take 1,000 Units by mouth daily.       CYANOCOBALAMIN, VITAMIN B-12, (VITAMIN B-12 ORAL) Take 1 tablet by mouth daily.       losartan (COZAAR) 100 MG tablet Take 1 tablet (100 mg total) by mouth daily. 90 tablet 0     MULTIVITAMIN ORAL Take 1 tablet by mouth daily.       No current facility-administered medications for this visit.            Lamberto Maddox (Rob), [facility]    7/16/2019

## 2021-05-30 NOTE — PATIENT INSTRUCTIONS - HE
"The blood pressure and other vital signs look excellent today.    No change in medications for now.    Recheck kidney labs and electrolytes today.    Follow-up with Dr. Lonnie Contreras in October for \"establish care\" visit.    Schedule appointment with me, as needed.    Have a great rest of your summer!  "

## 2021-05-30 NOTE — TELEPHONE ENCOUNTER
Former patient of helen Contreras & has not established care with another provider.  Please assign refill request to covering provider per Clinic standard process.      Refill Approved    Rx renewed per Medication Renewal Policy. Medication was last renewed on 7/5/18  .    Sirena Cowart, [facility] Triage/Med Refill 7/1/2019     Requested Prescriptions   Pending Prescriptions Disp Refills     atorvastatin (LIPITOR) 80 MG tablet [Pharmacy Med Name: ATORVASTATIN 80 MG TABLET] 90 tablet 3     Sig: TAKE 1 TABLET (80 MG TOTAL) BY MOUTH AT BEDTIME.       Statins Refill Protocol (Hmg CoA Reductase Inhibitors) Passed - 7/1/2019  1:17 AM        Passed - PCP or prescribing provider visit in past 12 months      Last office visit with prescriber/PCP: 3/7/2019 Helen Contreras MD OR same dept: 5/13/2019 Lamberto Maddox CNP OR same specialty: 5/13/2019 Lamberto Maddox CNP  Last physical: 7/5/2018 Last MTM visit: Visit date not found   Next visit within 3 mo: Visit date not found  Next physical within 3 mo: Visit date not found  Prescriber OR PCP: Helen Contreras MD  Last diagnosis associated with med order: 1. Hypercholesterolemia  - atorvastatin (LIPITOR) 80 MG tablet [Pharmacy Med Name: ATORVASTATIN 80 MG TABLET]; Take 1 tablet (80 mg total) by mouth at bedtime.  Dispense: 90 tablet; Refill: 3    If protocol passes may refill for 12 months if within 3 months of last provider visit (or a total of 15 months).

## 2021-05-31 ENCOUNTER — RECORDS - HEALTHEAST (OUTPATIENT)
Dept: ADMINISTRATIVE | Facility: CLINIC | Age: 71
End: 2021-05-31

## 2021-05-31 NOTE — TELEPHONE ENCOUNTER
Patient has EC set up with dr. Salazar 11/19/19. Routing refill.  Guerline Amaya CMA ............... 11:45 AM, 08/12/19

## 2021-05-31 NOTE — TELEPHONE ENCOUNTER
RN cannot approve Refill Request    Former patient of EMY Ben & has not established care with another provider.  Please assign refill request to covering provider per Clinic standard process.    RN can NOT refill this medication No PCP. Last office visit: 7/16/2019 Lamberto Maddox CNP Last Physical: Visit date not found Last MTM visit: Visit date not found Last visit same specialty: 7/16/2019 Lamberto Maddox CNP.  Next visit within 3 mo: Visit date not found  Next physical within 3 mo: Visit date not found      Josiane Pinzon, [facility] Triage/Med Refill 8/11/2019    Requested Prescriptions   Pending Prescriptions Disp Refills     losartan (COZAAR) 100 MG tablet [Pharmacy Med Name: LOSARTAN POTASSIUM 100 MG TAB] 90 tablet 0     Sig: TAKE 1 TABLET BY MOUTH EVERY DAY       Angiotensin Receptor Blocker Protocol Passed - 8/11/2019  8:31 AM        Passed - PCP or prescribing provider visit in past 12 months       Last office visit with prescriber/PCP: 7/16/2019 Lamberto Maddox CNP OR same dept: 7/16/2019 Lamberto Maddox CNP OR same specialty: 7/16/2019 Lamberto Maddox CNP  Last physical: Visit date not found Last MTM visit: Visit date not found   Next visit within 3 mo: Visit date not found  Next physical within 3 mo: Visit date not found  Prescriber OR PCP: Lamberto Maddox CNP  Last diagnosis associated with med order: 1. Essential hypertension  - losartan (COZAAR) 100 MG tablet [Pharmacy Med Name: LOSARTAN POTASSIUM 100 MG TAB]; TAKE 1 TABLET BY MOUTH EVERY DAY  Dispense: 90 tablet; Refill: 0    If protocol passes may refill for 12 months if within 3 months of last provider visit (or a total of 15 months).             Passed - Serum potassium within the past 12 months     Lab Results   Component Value Date    Potassium 4.4 07/16/2019             Passed - Blood pressure filed in past 12 months     BP Readings from Last 1 Encounters:   07/16/19 132/80             Passed - Serum creatinine within the past 12  months     Creatinine   Date Value Ref Range Status   07/16/2019 0.83 0.60 - 1.10 mg/dL Final

## 2021-06-01 VITALS — WEIGHT: 174.3 LBS | BODY MASS INDEX: 29.04 KG/M2 | HEIGHT: 65 IN

## 2021-06-01 NOTE — TELEPHONE ENCOUNTER
Former patient of abimael Contreras & has not established care with another provider.  Please assign refill request to covering provider per Clinic standard process.      Refill Approved    Rx renewed per Medication Renewal Policy. Medication was last renewed on 7/1/19.    Sirena Cowart, [facility] Triage/Med Refill 9/25/2019     Requested Prescriptions   Pending Prescriptions Disp Refills     atorvastatin (LIPITOR) 80 MG tablet [Pharmacy Med Name: ATORVASTATIN 80 MG TABLET] 90 tablet 0     Sig: TAKE 1 TABLET BY MOUTH EVERYDAY AT BEDTIME       Statins Refill Protocol (Hmg CoA Reductase Inhibitors) Passed - 9/24/2019 12:51 PM        Passed - PCP or prescribing provider visit in past 12 months      Last office visit with prescriber/PCP: Visit date not found OR same dept: 7/16/2019 Lamberto Maddox CNP OR same specialty: 7/16/2019 Lamberto Maddox CNP  Last physical: Visit date not found Last MTM visit: Visit date not found   Next visit within 3 mo: Visit date not found  Next physical within 3 mo: Visit date not found  Prescriber OR PCP: Yanick Salazar MD  Last diagnosis associated with med order: 1. Hypercholesterolemia  - atorvastatin (LIPITOR) 80 MG tablet [Pharmacy Med Name: ATORVASTATIN 80 MG TABLET]; TAKE 1 TABLET BY MOUTH EVERYDAY AT BEDTIME  Dispense: 90 tablet; Refill: 0    If protocol passes may refill for 12 months if within 3 months of last provider visit (or a total of 15 months).

## 2021-06-02 VITALS — WEIGHT: 162 LBS | BODY MASS INDEX: 26.75 KG/M2

## 2021-06-02 VITALS — WEIGHT: 172 LBS | BODY MASS INDEX: 28.4 KG/M2

## 2021-06-02 VITALS — BODY MASS INDEX: 28.01 KG/M2 | WEIGHT: 168.1 LBS | HEIGHT: 65 IN

## 2021-06-02 VITALS — BODY MASS INDEX: 28.49 KG/M2 | WEIGHT: 172.5 LBS

## 2021-06-02 VITALS — BODY MASS INDEX: 29.11 KG/M2 | WEIGHT: 176.3 LBS

## 2021-06-02 VITALS — BODY MASS INDEX: 29.3 KG/M2 | WEIGHT: 177.4 LBS

## 2021-06-03 VITALS
BODY MASS INDEX: 29.23 KG/M2 | DIASTOLIC BLOOD PRESSURE: 94 MMHG | HEART RATE: 88 BPM | SYSTOLIC BLOOD PRESSURE: 170 MMHG | WEIGHT: 177 LBS

## 2021-06-03 VITALS — WEIGHT: 178.3 LBS | BODY MASS INDEX: 29.44 KG/M2

## 2021-06-03 VITALS — BODY MASS INDEX: 29.33 KG/M2 | WEIGHT: 177.6 LBS

## 2021-06-03 NOTE — PATIENT INSTRUCTIONS - HE
Follow-up next month with Tom Maddox to reevaluate blood pressure.  Consider starting a low-dose hydrochlorothiazide.  Goal blood pressure less than 140/80.    Continue current medications at this time.    Results of today's lab work will be mailed to you.    See  to set up a screening cardiac CT scan for calcium score and a carotid ultrasound.    See me in January for blood pressure and general follow-up.    Increase regular walking.  Goal for 20 minutes of walking type exercise at least 3 times a week or more.    Reducing alcohol to 1 glass of wine or less a day is recommended.

## 2021-06-03 NOTE — PROGRESS NOTES
[facility] clinic Follow Up Note    Karen Li   69 y.o. female    Date of Visit: 2019    Chief Complaint   Patient presents with     Establish Care     Subjective  Karen is here to establish care and review multiple medical issues.    Former Dr. Helen Contreras and Dr. Arellano patient.    Patient's main issue is hypertension and questionable vascular disease.    Her   of a stroke over 10 years ago.  She has significant PTSD stress reaction when coming to [facility].    11 years ago during a grief counseling group she had difficulty finding words with severe stress reaction.  She was brought to the emergency room for evaluation.  Questionable TIA at the time but it does not appear to have been a TIA and she has not had any recurrence.    I did review the MRI from .  Was no stroke.  There is some questionable demyelinating process but patient stated she followed up with neurology after that and did not have any further concerns about demyelination.  She has not had any neurologic deficits.    I did review the carotid ultrasound from .  There is initially a concern over a 50-69% carotid artery stenosis on the right, but it was felt to have normal flow and no significant plaque, therefore there was a questionable finding.    She has not had any follow-up vascular evaluation.    I did review labs from  with an LDL of 209.    She is been on Lipitor 80 mg a day.    2018  and HDL 71.    She does not have significant generalized myalgias.  No history of liver problems.    I did review labs from 2018 with normal liver test.  Normal blood sugar and creatinine 0.7.    She occasionally has some puffiness on her ankles but none today.    She denies history of sleep apnea.    No palpitations or history of arrhythmia or syncope.    No chest pain or chest pressure.    She does live close to a [employer] and she is a member.  [place].    No falls.  Stable exertional ability.    No increasing shortness of breath or chest pressure with exertion.    She still drinks 2 to 3 glasses of wine in the evening and she is been doing that for years.  She does not feels a problem.  She has had some depression anxiety in the past but not on medication.  She spends most of her time with her dogs.  She has supportive friends.    She has a history of elevated MCV without anemia.  I did review the lab work-up from 2017 with benign pathology on blood smear and negative SPEP.  She had a normal B12 level at that time.    Review DEXA scan from September 2017.  Spine score -0.7.  Femur -0.6/-0.1.  No fracture history.    Past history of squamous cell cancer in the distant past.  She just saw dermatology last month.  Negative exam.  She was given a cream for some eczema which is already better.  Patient states she sees her dermatologist every 6 months for routine exam.    Status post hysterectomy with BSO 1992.  She does see gynecology still.  No GYN complaints or urinary complaints.    She gets her yearly mammograms obtained per radiology [place].  She stated she had a mammogram this past summer and it was negative.    No family history of early cancer.    Colonoscopy July 2013, I did review the colonoscopy report with patient.  Was normal and a 10-year follow-up plan given.  Normal bowel movements currently.    Patient had some intermittent bruising on her hands and takes her aspirin 81 mg only every other day.  No history of GI bleeding or stomach ulcer.    PMHx:  No past medical history on file.  PSHx:    Past Surgical History:   Procedure Laterality Date     ANKLE SURGERY Left 2002     CATARACT EXTRACTION       HYSTERECTOMY       TOTAL ABDOMINAL HYSTERECTOMY W/ BILATERAL SALPINGOOPHORECTOMY Bilateral 1992     Immunizations:   Immunization History   Administered Date(s) Administered     Influenza high dose,seasonal,PF, 65+ yrs 09/24/2015,  01/24/2017, 02/01/2018, 09/11/2018, 11/19/2019     Influenza, Seasonal, Inj PF IIV3 03/15/2011, 09/05/2012     Influenza, inj, historic,unspecified 10/25/2007     Influenza, seasonal,quad inj 6-35 mos 01/27/2009, 12/08/2009     Pneumo Conj 13-V (2010&after) 09/24/2015     Pneumo Polysac 23-V 01/24/2017     Rabies, historic 07/12/2013, 07/15/2013, 07/19/2013, 07/26/2013     Td,adult,historic,unspecified 07/11/2007     Tdap 07/02/2007, 01/24/2017       ROS A comprehensive review of systems was performed and was otherwise negative    Medications, allergies, and problem list were reviewed and updated    Exam  BP (!) 170/94 (Patient Site: Right Arm, Patient Position: Sitting, Cuff Size: Adult Large)   Pulse 88   Wt 177 lb (80.3 kg)   BMI 29.23 kg/m    Mildly anxious.  Pupils and irises equal and reactive.  Extraocular muscles intact.  No jaundice or conjunctivitis.  Pharynx does not appear crowded and no pharyngitis.  Teeth in good condition.  No cervical or supraclavicular adenopathy.  No JVD and no carotid bruits.  No thyromegaly or nodularity.  Lungs clear to auscultation with good respiratory excursion.  Heart is regular with no murmur rub or gallop.  No ankle edema.  Abdomen is mild to moderately overweight.  Nontender no hepatospleno megaly or pulsatile mass.    Blood pressure rechecked by me was 170/85.  Gait was within normal limits.  Muscle skeletal exam normal.  Normal neurologic exam.  Speech quality normal.    Assessment/Plan  1. Essential hypertension  Significant whitecoat hypertension.  Patient has not checked her blood pressure on her own as she states her cuff was not correlating well in the past.    In July her blood pressure was 132/80.    Patient wishes to follow-up with Tom Maddox next month to reevaluate blood pressure prior to medication changes.    I did recommend that patient start hydrochlorothiazide 12.5 mg a day in addition to the losartan 100 mg a day and amlodipine 5 mg a day.    She had  a previous cough with lisinopril, cough resolved.      - losartan (COZAAR) 100 MG tablet; Take 1 tablet (100 mg total) by mouth daily.  Dispense: 90 tablet; Refill: 2    She denies sleep apnea symptoms and did not feel she needed an evaluation.    I stressed the importance of low-salt diet and increasing regular walking.    2. Hypercholesterolemia  Severe hypercholesterolemia in 2015.  Well-controlled on last year check.    She is on high-dose Lipitor but appears to be tolerating well.  No generalized myalgias.  Recheck liver test today.    Unclear LDL goal.  Questional history of vascular disease, but I will have her reevaluated as below determine her stroke prevention plan.    Continue on aspirin, but I did suggest 81 mg every day would be a more efficacious dose.  But I did explain bleeding risk with aspirin.    Await work-up below.  - Lipid Cascade  - CT Cardiac Calcium Score; Future    3. Macrocytosis without anemia  Negative work-up in 2017.  Hemoglobin is been stable.  I suspect her chronic alcohol use is causing the elevated MCV.  - HM1(CBC and Differential)  - HM1 (CBC with Diff)    4. Asymptomatic carotid artery stenosis, right  Questionable finding on previous ultrasound in 2008.    Further evaluate with ultrasound.    Still on aspirin and high-dose Lipitor.  - US Carotid Bilateral; Future    5. Encounter for therapeutic drug monitoring    - Comprehensive Metabolic Panel  - HM1(CBC and Differential)  - HM1 (CBC with Diff)    6. Need for immunization against influenza  Given today  - Influenza High Dose,Seasonal,PF 65+ Yrs    Obtain records for mammogram was done this past summer and patient reports negative.    Status post hysterectomy with BSO but patient still sees gynecology.    10-year colonoscopy will be due July 2023    Past history of squamous cell cancer in years past.  Sees dermatology every 6 months was just seen this month.  Has a topical cream for eczema.    Time with patient greater than 40  minutes with greater than 50% of time coronation of care.  Time face-to-face.    Return in about 3 weeks (around 12/10/2019) for Recheck.   Patient Instructions   Follow-up next month with Tom Maddox to reevaluate blood pressure.  Consider starting a low-dose hydrochlorothiazide.  Goal blood pressure less than 140/80.    Continue current medications at this time.    Results of today's lab work will be mailed to you.    See  to set up a screening cardiac CT scan for calcium score and a carotid ultrasound.    See me in January for blood pressure and general follow-up.    Increase regular walking.  Goal for 20 minutes of walking type exercise at least 3 times a week or more.    Reducing alcohol to 1 glass of wine or less a day is recommended.    Yanick Salazar MD        Current Outpatient Medications   Medication Sig Dispense Refill     amLODIPine (NORVASC) 5 MG tablet TAKE 1 TABLET BY MOUTH EVERY DAY 90 tablet 2     aspirin 81 MG EC tablet Take 1 tablet (81 mg total) by mouth daily.  0     atorvastatin (LIPITOR) 80 MG tablet TAKE 1 TABLET BY MOUTH EVERYDAY AT BEDTIME 90 tablet 0     calcium carbonate-vitamin D3 600 mg(1,500mg) -400 unit Chew Chew 1 tablet daily.              CYANOCOBALAMIN, VITAMIN B-12, (VITAMIN B-12 ORAL) Take 1 tablet by mouth daily.       hydrocortisone 2.5 % ointment APPLY TO LIP RASH 6X/DAY X 1 WEEK  0     losartan (COZAAR) 100 MG tablet Take 1 tablet (100 mg total) by mouth daily. 90 tablet 2     MULTIVITAMIN ORAL Take 1 tablet by mouth daily.       triamcinolone (KENALOG) 0.1 % cream APPLY TO ARM, LEG, BACK RASH 3 TIMES DAILY X 1-2 WEEKS. AVOID THE FACE NECK GROIN AND AXILLA  0     cholecalciferol, vitamin D3, 1,000 unit tablet Take 1,000 Units by mouth daily. During the winter.             No current facility-administered medications for this visit.      No Known Allergies  Social History     Tobacco Use     Smoking status: Never Smoker     Smokeless tobacco: Never Used   Substance  Use Topics     Alcohol use: Yes     Comment: wine 2-3 glasses of wine a day     Drug use: No

## 2021-06-04 NOTE — TELEPHONE ENCOUNTER
Refill Approved    Rx renewed per Medication Renewal Policy. Medication was last renewed on 9/25/2019.    Tu Dunn, [facility] Triage/Med Refill 1/4/2020     Requested Prescriptions   Pending Prescriptions Disp Refills     atorvastatin (LIPITOR) 80 MG tablet [Pharmacy Med Name: ATORVASTATIN 80 MG TABLET] 90 tablet 0     Sig: TAKE 1 TABLET BY MOUTH EVERYDAY AT BEDTIME       Statins Refill Protocol (Hmg CoA Reductase Inhibitors) Passed - 1/2/2020 12:45 PM        Passed - PCP or prescribing provider visit in past 12 months      Last office visit with prescriber/PCP: 7/16/2019 Lamberto Maddox CNP OR same dept: 11/19/2019 Yanick Salazar MD OR same specialty: 11/19/2019 Yanick Salazar MD  Last physical: Visit date not found Last MTM visit: Visit date not found   Next visit within 3 mo: Visit date not found  Next physical within 3 mo: Visit date not found  Prescriber OR PCP: Lamberto Maddox CNP  Last diagnosis associated with med order: 1. Hypercholesterolemia  - atorvastatin (LIPITOR) 80 MG tablet [Pharmacy Med Name: ATORVASTATIN 80 MG TABLET]; TAKE 1 TABLET BY MOUTH EVERYDAY AT BEDTIME  Dispense: 90 tablet; Refill: 0    If protocol passes may refill for 12 months if within 3 months of last provider visit (or a total of 15 months).

## 2021-06-05 VITALS
HEART RATE: 84 BPM | DIASTOLIC BLOOD PRESSURE: 90 MMHG | HEIGHT: 66 IN | SYSTOLIC BLOOD PRESSURE: 158 MMHG | WEIGHT: 179.3 LBS | BODY MASS INDEX: 28.82 KG/M2

## 2021-06-05 VITALS
SYSTOLIC BLOOD PRESSURE: 162 MMHG | DIASTOLIC BLOOD PRESSURE: 84 MMHG | HEART RATE: 88 BPM | BODY MASS INDEX: 29.28 KG/M2 | WEIGHT: 182.2 LBS | HEIGHT: 66 IN

## 2021-06-05 NOTE — TELEPHONE ENCOUNTER
Refill Approved    Rx renewed per Medication Renewal Policy. Medication was last renewed on 5/1/19.    Sirena Cowart, [facility] Triage/Med Refill 1/23/2020     Requested Prescriptions   Pending Prescriptions Disp Refills     amLODIPine (NORVASC) 5 MG tablet [Pharmacy Med Name: AMLODIPINE BESYLATE 5 MG TAB] 90 tablet 2     Sig: TAKE 1 TABLET BY MOUTH EVERY DAY       Calcium-Channel Blockers Protocol Passed - 1/23/2020  1:55 AM        Passed - PCP or prescribing provider visit in past 12 months or next 3 months     Last office visit with prescriber/PCP: Visit date not found OR same dept: 11/19/2019 Yanick Salazar MD OR same specialty: 11/19/2019 Yanick Salazar MD  Last physical: Visit date not found Last MTM visit: Visit date not found   Next visit within 3 mo: Visit date not found  Next physical within 3 mo: Visit date not found  Prescriber OR PCP: Nupur George MD  Last diagnosis associated with med order: 1. Essential hypertension  - amLODIPine (NORVASC) 5 MG tablet [Pharmacy Med Name: AMLODIPINE BESYLATE 5 MG TAB]; TAKE 1 TABLET BY MOUTH EVERY DAY  Dispense: 90 tablet; Refill: 2    If protocol passes may refill for 12 months if within 3 months of last provider visit (or a total of 15 months).             Passed - Blood pressure filed in past 12 months     BP Readings from Last 1 Encounters:   11/19/19 (!) 170/94

## 2021-06-08 NOTE — PROGRESS NOTES
ASSESSMENT/PLAN:  1. Hypercholesterolemia  Has not been seen in some time and needs lab work done today.  She is encouraged to set up a physical exam.  We'll get lab work done today.  No coronary ischemic type symptoms.  - atorvastatin (LIPITOR) 40 MG tablet; Take 1 tablet (40 mg total) by mouth bedtime.  Dispense: 90 tablet; Refill: 3  - Comprehensive Metabolic Panel  - HM2(CBC w/o Differential)  - Lipid Cascade    2. Rotator cuff sprain  New problem and significant enough and long-term enough to consider physical therapy to get some exercises to strengthen her rotator cuff muscles and treat this strain.  It was related to a fall.    3. Transient cerebral ischemia  Previous and no recurrence and is on atorvastatin.  She should probably be on aspirin daily as well.  Her blood pressure was elevated and will need to recheck that.    4.  Open wound-tetanus was updated today and this was dressed with Silvadene and a bandage encouraged to take care of it.    Elevated blood pressure today will need to be followed up on when she returns.  Weight loss would be Outstanding.    Patient Instructions   Continue washing the wound on your hand and keep it dry afterward.     Place Silvadene cream on wound once daily.    Physical Therapy- #[phone]      Return in about 2 months (around 3/24/2017) for Annual physical.    CHIEF COMPLAINT:  Chief Complaint   Patient presents with     Hyperlipidemia     cut on her right hand from a fence     Due for Tdap     left shoulder pain -       HISTORY OF PRESENT ILLNESS:  Karen Li is a 66 y.o. female presenting to the clinic today hyperlipidemia. She is currently taking 40 mg of atorvastatin once daily and does not mention any adverse side effects from the medication.     Hand Wound: She was holding onto a fence and slid on the ice. She ended up cutting her right hand. She has been washing the wound with soap and water every day and putting triple antibiotic ointment on it.     Left  "Shoulder Pain: She fell back on 10/01/2016 and struck her left shoulder. Her left shoulder has been painful with ROM and has been unable to lift her weights while exercising.     Health Maintenance: She received the Tdap, PPSV 23, and influenza vaccinations today.     REVIEW OF SYSTEMS:   She denies any chest pain, tightness or pressure in the chest, shortness of breath, stroke-like symptoms, diplopia.   Comprehensive review of systems negative except as noted above.    PFSH:  Reviewed as above.     TOBACCO USE:  History   Smoking Status     Never Smoker   Smokeless Tobacco     Never Used       VITALS:  Vitals:    01/24/17 1145 01/24/17 1146   BP: (!) 148/92 (!) 152/94   Pulse: 72    Weight: 170 lb (77.1 kg)      Wt Readings from Last 3 Encounters:   01/24/17 170 lb (77.1 kg)   09/24/15 164 lb 1.6 oz (74.4 kg)     Estimated body mass index is 27.98 kg/(m^2) as calculated from the following:    Height as of 9/24/15: 5' 5.35\" (1.66 m).    Weight as of this encounter: 170 lb (77.1 kg).    PHYSICAL EXAM:  General Appearance: Alert, cooperative, no distress, appears stated age.  Lungs: Clear to auscultation bilaterally, good air movement.  Heart: Regular rate and rhythm, S1 and S2 normal, no murmur or bruit.  Musculoskeletal:  Left Shoulder; Complained of some pain with lifting, yet rotator cuff muscles all appeared to be intact without evidence of rupture.   Neurologic: CNII-XII intact, strength appeared normal.   Psychiatric:  She has a normal mood and affect.     ADDITIONAL HISTORY SUMMARIZED (FROM OLD RECORDS OR HISTORY FROM SOMEONE OTHER THAN THE PATIENT OR ANOTHER HEALTHCARE PROVIDER) (2 TOTAL): None.    DECISION TO OBTAIN EXTRA INFORMATION (OLD RECORDS REQUESTED OR HISTORY FROM ANOTHER PERSON OR ACCESSING CARE EVERYWHERE) (1 TOTAL): None.     RADIOLOGY TESTS SUMMARIZED OR ORDERED (XRAY/CT/MRI/DXA) (1 TOTAL): None.    LABS REVIEWED OR ORDERED (1 TOTAL): Ordered UA, HM2, CMP, and lipid labs today.    MEDICINE TESTS " SUMMARIZED OR ORDERED (EKG/ECHO/COLONOSCOPY/EGD) (1 TOTAL): None.    INDEPENDENT REVIEW OF EKG OR X-RAY (2 EACH): None.       The visit lasted a total of 6 minutes face to face with the patient. Over 50% of the time was spent counseling and educating the patient about left shoulder pain.    IBrittany, am scribing for and in the presence of, Dr. Kolb.    I, Dr. Kolb, personally performed the services described in this documentation, as scribed by Brittany Hameed in my presence, and it is both accurate and complete.    MEDICATIONS:  Current Outpatient Prescriptions   Medication Sig Dispense Refill     atorvastatin (LIPITOR) 40 MG tablet Take 1 tablet (40 mg total) by mouth bedtime. 90 tablet 3     cholecalciferol, vitamin D3, 1,000 unit tablet Take 1,000 Units by mouth daily.       No current facility-administered medications for this visit.        Total data points: 1

## 2021-06-09 NOTE — PROGRESS NOTES
ASSESSMENT:  1. Routine general medical examination at a health care facility  All appears to be up-to-date    2. Macrocytosis without anemia  We'll check further lab work today.  Workup has been negative in the past and she denies excessive drinking.  We will keep track of things over time but nothing worrisome so far has been found.  This may just be her normal.  - Electrophoresis, Protein, Serum  - Immunofixation Electrophoresis, Serum  - Morphology, Path Smear Review (MORP)    3. Hypercholesterolemia  Her lipids are not well enough controlled and we will increase to 80 mg daily.    4. Transient cerebral ischemia  Carotid ultrasound showed no plaque.  No recurrent symptoms.  Will remain on aspirin and statin.      PLAN:  Patient Instructions   My recommendation is you have the shingles vaccination completed somewhere.    Send us your mammograms- #[phone]    Increase atorvastatin to 80 mg once daily.     Orders Placed This Encounter   Procedures     Electrophoresis, Protein, Serum     Immunofixation Electrophoresis, Serum     Morphology, Path Smear Review (MORP)     Medications Discontinued During This Encounter   Medication Reason     atorvastatin (LIPITOR) 40 MG tablet        No Follow-up on file.    ASSESSED PROBLEMS:  Problem List Items Addressed This Visit     Transient Ischemic Attack    Macrocytosis without anemia    Relevant Orders    Electrophoresis, Protein, Serum    Immunofixation Electrophoresis, Serum    Morphology, Path Smear Review (MORP)    Hypercholesterolemia      Other Visit Diagnoses     Routine general medical examination at a health care facility    -  Primary          CHIEF COMPLAINT:  Chief Complaint   Patient presents with     Annual Exam       HISTORY OF PRESENT ILLNESS:  Karen Li is a 66 y.o. female presenting to the clinic today for an annual physical exam.    Transient Ischemic Attack: She states that her TIA was years ago. She had a carotid ultrasound completed in 01/2008  and it showed 50-69% stenosis in the right carotid. Her lipid levels were elevated when last checked on 1/24/17. Her total cholesterol was 229 and her HDL cholesterol was 81. She does her best to eat very little red meat. She is currently taking 40 mg of atorvastatin daily.     Health maintenance: She had a colonoscopy completed in 7/13/10 and it was normal; she is on a 10-year screening plan. She states that she had a mammogram in summer 2016 and will get us those records. She is due for the Zoster vaccination, otherwise all of her immunizations are up to date at this time.     REVIEW OF SYSTEMS:   She states that she has 2 glasses of wine with dinner every night during the week; if she goes out to dinner on the weekends she will have a little more. She does not believe her consumption has changed within the last couple of years.   See completed form B. Comprehensive review of systems negative except as noted above.    PFSH:  History reviewed. No pertinent past medical history.  Past Surgical History:   Procedure Laterality Date     ANKLE SURGERY Left 2002     CATARACT EXTRACTION       TOTAL ABDOMINAL HYSTERECTOMY W/ BILATERAL SALPINGOOPHORECTOMY Bilateral 1992     Family History   Problem Relation Age of Onset     Coronary artery disease Father 70     Social History     Social History     Marital status:      Spouse name: N/A     Number of children: N/A     Years of education: N/A     Occupational History     Not on file.     Social History Main Topics     Smoking status: Never Smoker     Smokeless tobacco: Never Used     Alcohol use Yes      Comment: wine 2-3 glasses of wine a day     Drug use: No     Sexual activity: No     Other Topics Concern     Not on file     Social History Narrative   Social: She has a 15.5 year old dog at home that prevents her from traveling anywhere for the time being.    VITALS:  Vitals:    03/16/17 0927   BP: 122/64   Pulse: 64   Weight: 168 lb 12.8 oz (76.6 kg)   Height: 5'  "5.5\" (1.664 m)     Wt Readings from Last 3 Encounters:   03/16/17 168 lb 12.8 oz (76.6 kg)   01/24/17 170 lb (77.1 kg)   09/24/15 164 lb 1.6 oz (74.4 kg)     Body mass index is 27.66 kg/(m^2).  The following high BMI interventions were performed this visit: dietary management education, guidance, and counseling and lifestyle education regarding diet    PHYSICAL EXAM:  General Appearance: Alert, cooperative, no distress, appears stated age.  HEENT: EMOI, fundi not observed, TMs normal, mouth and throat without lesions.  Neck: Supple without adenopathy or thyromegaly.  Back: No CVA tenderness or spinous process pain.  Lungs: Clear to auscultation bilaterally, good air movement.  Heart: Regular rate and rhythm, S1 and S2 normal, no murmur or bruit.  Abdomen: Soft, non-tender, no HSM or masses.  Breast: Normal fatty and fibrocystic changes bilateral, more significant in upper outer quadrants.  GYN: Not necessary.    Musculoskeletal: No gross abnormalities.  Extremities: No CCE, pulses II/IV and symmetric.  Skin: No worrisome lesions noted.  Lymph nodes: Cervical, supraclavicular, groin, and axillary nodes normal.  Neurologic: CNII-XII intact, strength V/V and symmetric, DTRs II/IV and symmetric, sensory grossly intact  Psychiatric:  She has a normal mood and affect.     ADDITIONAL HISTORY SUMMARIZED (FROM OLD RECORDS OR HISTORY FROM SOMEONE OTHER THAN THE PATIENT OR ANOTHER HEALTHCARE PROVIDER) (2 TOTAL): None.    DECISION TO OBTAIN EXTRA INFORMATION (OLD RECORDS REQUESTED OR HISTORY FROM ANOTHER PERSON OR ACCESSING CARE EVERYWHERE) (1 TOTAL): None.     RADIOLOGY TESTS SUMMARIZED OR ORDERED (XRAY/CT/MRI/DXA) (1 TOTAL): Summarized carotid US from 1/11/2008; 50-69% stenosis in right carotid.    LABS REVIEWED OR ORDERED (1 TOTAL): Reviewed labs from 1/24/17; CMP, HM2, lipid, and UA. Ordered protein electrophoresis, immunofixation, and morphology labs today.     MEDICINE TESTS SUMMARIZED OR ORDERED " (EKG/ECHO/COLONOSCOPY/EGD) (1 TOTAL): None.    INDEPENDENT REVIEW OF EKG OR X-RAY (2 EACH): None.     The visit lasted a total of 25 minutes face to face with the patient. Over 50% of the time was spent counseling and educating the patient about health maintenance.    IBrittany, am scribing for and in the presence of, Dr. Kolb.    I, Dr. Kolb, personally performed the services described in this documentation, as scribed by Brittany Hameed in my presence, and it is both accurate and complete.    MEDICATIONS:  Current Outpatient Prescriptions   Medication Sig Dispense Refill     aspirin 81 MG EC tablet Take 81 mg by mouth daily.       cholecalciferol, vitamin D3, 1,000 unit tablet Take 1,000 Units by mouth daily.       No current facility-administered medications for this visit.        Total data points: 2

## 2021-06-09 NOTE — CONSULTS
[facility] Hematology and Oncology Consult Note    Patient: Karen Li  MRN: [id number]  Date of Service: 04/04/2017      Reason for Visit    I was asked by Dr. Sarabjit Kolb regarding macrocytosis      Assessment/Plan    ECOG Performance   ECOG Performance Status: 0  Distress Assessment  Distress Assessment Score: 1      A 66-year-old female with chronic macrocytosis without anemia.  I suspect it could be related to her chronic alcohol use.  It could also be a normal variant.    I review her labs from previous in detail.  She has normal WBC and platelets and normal hemoglobin.  There is no evidence of chronic liver disease.  There is no evidence of thyroid disease, or B12 deficiency.  Some workup for multiple myeloma was negative.  No evidence of dysplastic red cells noted on peripheral smear making less suggestive of MDS as such.    I reviewed macrocytosis in general and possible causes of pathologic process and benign process.  We do not have her RBC indices prior to 2011.  I also discussed about possibility of a normal variant.  I discussed about potential causes including chronic liver disease, alcohol use, thyroid disorder, vitamin B12 deficiency, iron deficiency, hemolysis or cold agglutinin disease, primary bone marrow disease.  I will update her TSH, vitamin B12 and check on hemolysis lab.  I specifically requested to send to the lab in a warm carrier to the lab to determine any correction of her MCV.  I explained to the patient about possibility of alcohol induced macrocytosis and discussed about the a trial of stopping alcohol use for about 2 weeks then recheck her blood count.  Patient is not ready to do so and she would like to start with initial workup today.  I also discussed about potential need a bone marrow biopsy although does not appear to be an urgent manner.  In addition, I would like to see the response after withdrawing alcohol before proceeding with bone marrow biopsy.  She  agreed.    I will communicate with her once I have all the blood work requested today with further recommendations.      Problem List    1. Macrocytosis without anemia  Lactate Dehydrogenase (LDH)    Reticulocytes    Vitamin B12    HM1(CBC and Differential)    Ferritin    Folate, Serum    Haptoglobin    Thyroid Stimulating Hormone (TSH)    Immunoglobulin Free Light Chains, Serum    HM1 (CBC with Diff)   2. Other abnormality of red blood cells   Ferritin   3. Hypothyroidism, unspecified type   Thyroid Stimulating Hormone (TSH)     ______________________________________________________________________________    History  Ms. Karen Li is a very pleasant 58-year-old female presented today for evaluation of chronic macrocytosis.  She was noted to have macrocytosis since 2011 without anemia.  MCV are in the range of 101-107 with elevated MCH, normal MCHC and normal RDW.  She has normal red cell mass.  Peripheral smear showed normochromic macrocytic, without anisopoikilocytosis, polychromasia or rouleaux formation.  TSH was last checked in 2011 and are normal at 1.38.  Vitamin B12 was greater than thousand and 2012.  SPEP on 3/27/2017 showed no monoclonal protein and immunofixation study confirmed unremarkable electrophoresis.  She does not have any fever, night sweats, or bone pain or palpable lumps or bumps.  She is overall feeling very well without any specific physical concern.  She reported that she has been drinking wine (different kinds) about 2 glasses daily and about 3 glasses on weekends.  She has been drinking wine since her 20s and he was part of her job as of  in [place] previously.  She really enjoy drinking Y that she never think about quit drinking.  She denies any other alcoholic beverages use.    She has 2 brothers and they are healthy.  No family history of cancer, leukemia, lymphoma or myeloma.  She is .  No children.    Past History    Hypercholesterolemia  Family History  "  Problem Relation Age of Onset     Coronary artery disease Father 70      Hysterectomy-1992  Left ankle fracture-2001  Social History     Social History     Marital status:      Spouse name: N/A     Number of children: N/A     Years of education: N/A     Occupational History     Not on file.     Social History Main Topics     Smoking status: Never Smoker     Smokeless tobacco: Never Used     Alcohol use Yes      Comment: wine 2-3 glasses of wine a day     Drug use: No     Sexual activity: No     Other Topics Concern     Not on file     Social History Narrative        Allergies    No Known Allergies    Review of Systems  Pain  Currently in Pain: No/denies  Apart from describing in history, the remainder of comprehensive ROS was negative.    Physical Exam    Recent Vitals 4/4/2017   Height 5' 5.5\"   Weight 168 lbs 13 oz   BSA (m2) 1.88 m2   /82   Pulse 71   SpO2 97     General: alert, awake, not in acute distress  HEENT: Head: Normal, normocephalic, atraumatic.  Eye: Normal external eye, conjunctiva, lids cornea, STAN.  Ears: Normal TM's bilaterally. Normal auditory canals and external ears. Non-tender.  Nose: Normal external nose, mucus membranes and septum.  Pharynx: Dental Hygiene adequate. Normal buccal mucosa. Normal pharynx.  Neck / Thyroid: Supple, no masses, nodes, nodules or enlargement.  Lymphatics: No abnormally enlarged lymph nodes.  Chest: Normal chest wall and respirations. Clear to auscultation.  Heart: S1 S2 RRR, no murmur.   Abdomen: abdomen is soft without significant tenderness, masses, organomegaly or guarding  Extremities: normal strength, tone, and muscle mass  Skin: normal. no rash or abnormalities  CNS: non focal.    Lab Results    Recent Results (from the past 168 hour(s))   Lactate Dehydrogenase (LDH)   Result Value Ref Range    LD (LDH) 232 (H) 125 - 220 U/L   Reticulocytes   Result Value Ref Range    Retic Absolute Count 0.078 0.010 - 0.110 mill/uL   Thyroid Stimulating " Hormone (TSH)   Result Value Ref Range    TSH 2.47 0.30 - 5.00 uIU/mL   HM1 (CBC with Diff)   Result Value Ref Range    WBC 5.8 4.0 - 11.0 thou/uL    RBC 4.07 3.80 - 5.40 mill/uL    Hemoglobin 13.7 12.0 - 16.0 g/dL    Hematocrit 41.0 35.0 - 47.0 %     (H) 80 - 100 fL    MCH 33.7 27.0 - 34.0 pg    MCHC 33.4 32.0 - 36.0 g/dL    RDW 13.1 11.0 - 14.5 %    Platelets 187 140 - 440 thou/uL    MPV 10.3 8.5 - 12.5 fL    Neutrophils % 65 50 - 70 %    Lymphocytes % 24 20 - 40 %    Monocytes % 9 2 - 10 %    Eosinophils % 1 0 - 6 %    Basophils % 1 0 - 2 %    Neutrophils Absolute 3.8 2.0 - 7.7 thou/uL    Lymphocytes Absolute 1.4 0.8 - 4.4 thou/uL    Monocytes Absolute 0.5 0.0 - 0.9 thou/uL    Eosinophils Absolute 0.1 0.0 - 0.4 thou/uL    Basophils Absolute 0.0 0.0 - 0.2 thou/uL       Imaging Results    No results found.      Signed by: Zari Mobley MD

## 2021-06-11 NOTE — TELEPHONE ENCOUNTER
Refill Approved    Rx renewed per Medication Renewal Policy. Medication was last renewed on 11/19/19, last OV 11/19/19.    Melanie Pascual, [facility] Triage/Med Refill 9/6/2020     Requested Prescriptions   Pending Prescriptions Disp Refills     losartan (COZAAR) 100 MG tablet 90 tablet 2     Sig: Take 1 tablet (100 mg total) by mouth daily.       Angiotensin Receptor Blocker Protocol Passed - 9/4/2020 10:55 AM        Passed - PCP or prescribing provider visit in past 12 months       Last office visit with prescriber/PCP: 11/19/2019 Yanick Salazar MD OR same dept: 11/19/2019 Yanick Salazar MD OR same specialty: 11/19/2019 Yanick Salazar MD  Last physical: Visit date not found Last MTM visit: Visit date not found   Next visit within 3 mo: Visit date not found  Next physical within 3 mo: Visit date not found  Prescriber OR PCP: Yanick Salazar MD  Last diagnosis associated with med order: 1. Essential hypertension  - losartan (COZAAR) 100 MG tablet; Take 1 tablet (100 mg total) by mouth daily.  Dispense: 90 tablet; Refill: 2    If protocol passes may refill for 12 months if within 3 months of last provider visit (or a total of 15 months).             Passed - Serum potassium within the past 12 months     Lab Results   Component Value Date    Potassium 4.2 11/19/2019             Passed - Blood pressure filed in past 12 months     BP Readings from Last 1 Encounters:   11/19/19 (!) 170/94             Passed - Serum creatinine within the past 12 months     Creatinine   Date Value Ref Range Status   11/19/2019 0.84 0.60 - 1.10 mg/dL Final

## 2021-06-12 NOTE — TELEPHONE ENCOUNTER
Refill Approved    Rx renewed per Medication Renewal Policy. Medication was last renewed on 9/6/20.    Sirena Cowart, [facility] Triage/Med Refill 10/5/2020     Requested Prescriptions   Pending Prescriptions Disp Refills     losartan (COZAAR) 100 MG tablet [Pharmacy Med Name: LOSARTAN POTASSIUM 100 MG TAB] 90 tablet 0     Sig: TAKE 1 TABLET BY MOUTH EVERY DAY       Angiotensin Receptor Blocker Protocol Passed - 10/2/2020  2:07 PM        Passed - PCP or prescribing provider visit in past 12 months       Last office visit with prescriber/PCP: 11/19/2019 Yanick Salazar MD OR same dept: 11/19/2019 Yanick Salazar MD OR same specialty: 11/19/2019 Yanick Salazar MD  Last physical: Visit date not found Last MTM visit: Visit date not found   Next visit within 3 mo: Visit date not found  Next physical within 3 mo: Visit date not found  Prescriber OR PCP: Yanick Salazar MD  Last diagnosis associated with med order: 1. Essential hypertension  - losartan (COZAAR) 100 MG tablet [Pharmacy Med Name: LOSARTAN POTASSIUM 100 MG TAB]; TAKE 1 TABLET BY MOUTH EVERY DAY  Dispense: 90 tablet; Refill: 0    If protocol passes may refill for 12 months if within 3 months of last provider visit (or a total of 15 months).             Passed - Serum potassium within the past 12 months     Lab Results   Component Value Date    Potassium 4.2 11/19/2019             Passed - Blood pressure filed in past 12 months     BP Readings from Last 1 Encounters:   11/19/19 (!) 170/94             Passed - Serum creatinine within the past 12 months     Creatinine   Date Value Ref Range Status   11/19/2019 0.84 0.60 - 1.10 mg/dL Final

## 2021-06-12 NOTE — TELEPHONE ENCOUNTER
Refill Approved    Rx renewed per Medication Renewal Policy. Medication was last renewed on 1/23/20.    Sirena Cowart, [facility] Connection Triage/Med Refill 10/5/2020     Requested Prescriptions   Pending Prescriptions Disp Refills     amLODIPine (NORVASC) 5 MG tablet [Pharmacy Med Name: AMLODIPINE BESYLATE 5 MG TAB] 90 tablet 2     Sig: TAKE 1 TABLET BY MOUTH EVERY DAY       Calcium-Channel Blockers Protocol Passed - 10/2/2020  2:07 PM        Passed - PCP or prescribing provider visit in past 12 months or next 3 months     Last office visit with prescriber/PCP: Visit date not found OR same dept: 11/19/2019 Yanick Salazar MD OR same specialty: 11/19/2019 Yanick Salazar MD  Last physical: Visit date not found Last MTM visit: Visit date not found   Next visit within 3 mo: Visit date not found  Next physical within 3 mo: Visit date not found  Prescriber OR PCP: Nupur George MD  Last diagnosis associated with med order: 1. Essential hypertension  - amLODIPine (NORVASC) 5 MG tablet [Pharmacy Med Name: AMLODIPINE BESYLATE 5 MG TAB]; TAKE 1 TABLET BY MOUTH EVERY DAY  Dispense: 90 tablet; Refill: 2    If protocol passes may refill for 12 months if within 3 months of last provider visit (or a total of 15 months).             Passed - Blood pressure filed in past 12 months     BP Readings from Last 1 Encounters:   11/19/19 (!) 170/94

## 2021-06-14 NOTE — PATIENT INSTRUCTIONS - HE
Follow-up with me in a few weeks to recheck your blood pressure.    Bring your blood pressure cuff with you to that appointment so we can check its accuracy.    There has been some discussion about adding hydrochlorothiazide 12.5 mg to your regimen if your blood pressure continues to be an issue.    For now, we will not make any changes.    Refills of your amlodipine sent into your pharmacy.

## 2021-06-14 NOTE — PATIENT INSTRUCTIONS - HE
It sounds like your home BP readings have been within normal range.    Follow-up with us if you notice that your systolic blood pressure is consistently greater than 140.  Your diastolic measurement should be consistently less than 90.    Blood pressure should never be less than 105/60.    Follow-up with either PCP or eye in 6 to 12 months for routine checkup.

## 2021-06-14 NOTE — PROGRESS NOTES
Clinic Note    Assessment:     Assessment and Plan:    1. Essential hypertension  Established history of whitecoat hypertension.  She brought her blood pressure cuff with her today.  It is accurate.  Home readings have been acceptable.  We will not make any changes to her regimen today and have her follow-up with us as needed.  See patient instructions below for plan of care.       Patient Instructions   It sounds like your home BP readings have been within normal range.    Follow-up with us if you notice that your systolic blood pressure is consistently greater than 140.  Your diastolic measurement should be consistently less than 90.    Blood pressure should never be less than 105/60.    Follow-up with either PCP or eye in 6 to 12 months for routine checkup.    Return in about 6 months (around 7/26/2021).         Subjective:      Patient comes the clinic today for BP recheck.    I saw her 3 weeks ago.  At that time, her blood pressure approximately 150/90.  She does have a home cuff and has been checking regularly.    We checked her cuff accuracy today; it is fairly accurate within 10 points of our manual check.    Most home readings are between 120 and 130 systolic.  Diastolics normal.    She did have a few outlier systolic readings in the low 140s.    She has an established history of whitecoat hypertension.  Her  passed away at [facility] about 10 years ago.  She has a severe stress reaction when driving past the [facility] [address]    Discussing her whitecoat hypertension and the events that occurred 10 years ago brings about tears in the exam room.    She continues on amlodipine and losartan for her BP.  She has a small amount of swelling in her ankles associated with the amlodipine but is otherwise doing well today.      Review of Systems:    Review is otherwise negative except for what is mentioned above.     Social Hx:    Social History     Tobacco Use   Smoking Status Never Smoker  "  Smokeless Tobacco Never Used         Objective:     Vitals:    01/26/21 1105   BP: 162/84   Patient Site: Right Arm   Patient Position: Sitting   Cuff Size: Adult Large   Pulse: 88   Weight: 182 lb 3.2 oz (82.6 kg)   Height: 5' 5.5\" (1.664 m)       Exam:    General: No apparent distress. Calm. Alert and Oriented X3. Pt behavior is appropriate.      Patient Active Problem List   Diagnosis     Transient Ischemic Attack     Macrocytosis without anemia     Hypercholesterolemia     Squamous cell carcinoma of skin, unspecified     HTN (hypertension)     Current Outpatient Medications   Medication Sig Dispense Refill     amLODIPine (NORVASC) 5 MG tablet Take 1 tablet (5 mg total) by mouth daily. 90 tablet 0     aspirin 81 MG EC tablet Take 1 tablet (81 mg total) by mouth daily. (Patient taking differently: Take 81 mg by mouth 2 (two) times a day. )  0     atorvastatin (LIPITOR) 80 MG tablet TAKE 1 TABLET BY MOUTH EVERYDAY AT BEDTIME 90 tablet 0     calcium carbonate-vitamin D3 600 mg(1,500mg) -400 unit Chew Chew 1 tablet daily.              cholecalciferol, vitamin D3, 1,000 unit tablet Take 1,000 Units by mouth daily. During the winter.             losartan (COZAAR) 100 MG tablet TAKE 1 TABLET BY MOUTH EVERY DAY 90 tablet 0     MULTIVITAMIN ORAL Take 1 tablet by mouth daily.       No current facility-administered medications for this visit.            Lamberto Maddox (Rob), [facility]    1/26/2021           "

## 2021-06-14 NOTE — PROGRESS NOTES
"  Assessment & Plan     Essential hypertension  Indeterminate level of control.  Her blood pressure was initially quite elevated upon rooming.  After about 15 minutes, it did come down a bit but was still elevated at 158/90.  She does have a blood pressure cuff at home.  She will follow-up with me in 2 weeks for blood pressure recheck and bring her cuff with her to that appointment.  We talked about adding HCTZ 12.5 mg daily to her regimen at that point, if needed    - amLODIPine (NORVASC) 5 MG tablet  Dispense: 90 tablet; Refill: 0  - Basic Metabolic Panel  - HM2(CBC w/o Differential)    Hypercholesterolemia  Controlled with atorvastatin 80 mg daily.  She uses a baby aspirin daily.  I reviewed her coronary artery calcium CT test from last year; coronary artery calcium score of 255.  - Lipid Profile    Encounter for immunization  - Influenza,Quad,High Dose,PF 65 YR+      25 minutes spent on the date of the encounter doing chart review, history and exam, documentation and further activities as noted above       BMI:   Estimated body mass index is 29.38 kg/m  as calculated from the following:    Height as of this encounter: 5' 5.5\" (1.664 m).    Weight as of this encounter: 179 lb 4.8 oz (81.3 kg).       Return in about 2 weeks (around 1/19/2021).    Lamberto Maddox [facility]    Subjective     She has a history of elevated blood pressures, thought to be related to whitecoat hypertension.  She does seem to have a stress related reaction to coming to the clinic and having her blood pressure checked.    She needs a fasting cholesterol panel rechecked today.  She uses atorvastatin 80 mg daily, along with a baby aspirin daily.  She had a coronary artery calcium score at done last year, along with a bilateral carotid ultrasound to evaluate for atherosclerotic disease.    She does not check her blood pressures at home.    Review of Systems  Negative      Objective    /90   Pulse " "84   Ht 5' 5.5\" (1.664 m)   Wt 179 lb 4.8 oz (81.3 kg)   BMI 29.38 kg/m    Body mass index is 29.38 kg/m .     Physical Exam  General: No apparent distress. Calm. Alert and Oriented X3. Pt behavior is appropriate.  Head:Atraumatic. Normocephalic, non-tender to palpation  Neck: Supple. No JVD. Full ROM.   Eyes: PERRL, No discharge. No strabismus. No nystagmus.  Ears: TMs pearly gray with landmarks visible.   Nose/Mouth/Throat: Patent nares, no oral lesions, pharynx clear and without exudate. Uvula mid-line. Nasal septum mid-line. Clear turbinates.   Lymph: No axillar or cervical adenopathy.   Chest/Lungs: Normal chest wall, clear to auscultation, normal respiratory effort and rate.   Heart/Pulses: Regular rate and rhythm, strong and equal radial pulses, no murmurs. Capillary refill <2 seconds. No edema.   Abdomen: Soft, no palpable masses. No hepatosplenomegaly, no tenderness with palpation noted. Bowel sounds active in all quadrants. No increased tympany.   Genitalia: Not examined.   Musculoskeletal: No CVA tenderness with palpation. Good ROM with extremities.   Neurologic: Interactive, alert, no focal findings, CNs intact.   Skin: Warm, dry. Normal hair pattern. Free of lesions. Normal skin turgor.         I spent a total of 15 minutes face-to-face with Karen Li during today's office visit.  Over 50% of this time was spent counseling the patient and/or coordinating care regarding her hypertension.  See note for details.          "

## 2021-06-14 NOTE — TELEPHONE ENCOUNTER
RN cannot approve Refill Request    RN can NOT refill this medication PCP messaged that patient is overdue for Office Visit. Last office visit: 7/16/2019 Lamberto Maddox CNP Last Physical: Visit date not found Last MTM visit: Visit date not found Last visit same specialty: 11/19/2019 Yanick Salazar MD.  Next visit within 3 mo: Visit date not found  Next physical within 3 mo: Visit date not found      Melanie Pascual, [facility] Triage/Med Refill 1/1/2021    Requested Prescriptions   Pending Prescriptions Disp Refills     atorvastatin (LIPITOR) 80 MG tablet [Pharmacy Med Name: ATORVASTATIN 80 MG TABLET] 90 tablet 3     Sig: TAKE 1 TABLET BY MOUTH EVERYDAY AT BEDTIME       Statins Refill Protocol (Hmg CoA Reductase Inhibitors) Failed - 12/30/2020  3:13 PM        Failed - PCP or prescribing provider visit in past 12 months      Last office visit with prescriber/PCP: 7/16/2019 Lamberto Maddox CNP OR same dept: Visit date not found OR same specialty: 11/19/2019 Yanick Salazar MD  Last physical: Visit date not found Last MTM visit: Visit date not found   Next visit within 3 mo: Visit date not found  Next physical within 3 mo: Visit date not found  Prescriber OR PCP: Lamberto Maddox CNP  Last diagnosis associated with med order: 1. Hypercholesterolemia  - atorvastatin (LIPITOR) 80 MG tablet [Pharmacy Med Name: ATORVASTATIN 80 MG TABLET]; TAKE 1 TABLET BY MOUTH EVERYDAY AT BEDTIME  Dispense: 90 tablet; Refill: 3    If protocol passes may refill for 12 months if within 3 months of last provider visit (or a total of 15 months).

## 2021-06-15 NOTE — PROGRESS NOTES
Chief Complaint   Patient presents with     Flu Vaccine     Flu consent and contraindication forms are given/ signed/ reviewed and sent to medical records to scan.     Mare Crawford CMA [facility] 2/1/2018 9:01 AM

## 2021-06-15 NOTE — TELEPHONE ENCOUNTER
RN cannot approve Refill Request    RN can NOT refill this medication PCP messaged that patient is overdue for Office Visit. Last office visit: 11/19/2019 Yanick Salazar MD Last Physical: Visit date not found Last MTM visit: Visit date not found Last visit same specialty: 11/19/2019 Yanick Salazar MD.  Next visit within 3 mo: Visit date not found  Next physical within 3 mo: Visit date not found      Melanie Pascual, [facility] Triage/Med Refill 2/14/2021    Requested Prescriptions   Pending Prescriptions Disp Refills     losartan (COZAAR) 100 MG tablet [Pharmacy Med Name: LOSARTAN POTASSIUM 100 MG TAB] 90 tablet 0     Sig: TAKE 1 TABLET BY MOUTH EVERY DAY       Angiotensin Receptor Blocker Protocol Failed - 2/14/2021  9:17 AM        Failed - PCP or prescribing provider visit in past 12 months       Last office visit with prescriber/PCP: 11/19/2019 Yanick Salazar MD OR same dept: Visit date not found OR same specialty: 11/19/2019 Yanick Salazar MD  Last physical: Visit date not found Last MTM visit: Visit date not found   Next visit within 3 mo: Visit date not found  Next physical within 3 mo: Visit date not found  Prescriber OR PCP: Yanick Salazar MD  Last diagnosis associated with med order: 1. Essential hypertension  - losartan (COZAAR) 100 MG tablet [Pharmacy Med Name: LOSARTAN POTASSIUM 100 MG TAB]; TAKE 1 TABLET BY MOUTH EVERY DAY  Dispense: 90 tablet; Refill: 0    If protocol passes may refill for 12 months if within 3 months of last provider visit (or a total of 15 months).             Passed - Serum potassium within the past 12 months     Lab Results   Component Value Date    Potassium 4.2 01/05/2021             Passed - Blood pressure filed in past 12 months     BP Readings from Last 1 Encounters:   01/26/21 162/84             Passed - Serum creatinine within the past 12 months     Creatinine   Date Value Ref Range Status   01/05/2021 0.81 0.60 - 1.10 mg/dL Final

## 2021-06-16 PROBLEM — I10 HTN (HYPERTENSION): Status: ACTIVE | Noted: 2018-09-27

## 2021-06-16 NOTE — TELEPHONE ENCOUNTER
RN cannot approve Refill Request    RN can NOT refill this medication PCP messaged that patient is overdue for Office Visit. Last office visit: 11/19/2019 Yanick Salazar MD Last Physical: Visit date not found Last MTM visit: Visit date not found Last visit same specialty: 11/19/2019 Yanick Salazar MD.  Next visit within 3 mo: Visit date not found  Next physical within 3 mo: Visit date not found      Trang Huntley, [facility] Triage/Med Refill 3/29/2021    Requested Prescriptions   Pending Prescriptions Disp Refills     atorvastatin (LIPITOR) 80 MG tablet [Pharmacy Med Name: ATORVASTATIN 80 MG TABLET] 90 tablet 0     Sig: TAKE 1 TABLET BY MOUTH EVERYDAY AT BEDTIME       Statins Refill Protocol (Hmg CoA Reductase Inhibitors) Failed - 3/29/2021  1:07 PM        Failed - PCP or prescribing provider visit in past 12 months      Last office visit with prescriber/PCP: 11/19/2019 Yanick Salazar MD OR same dept: Visit date not found OR same specialty: 11/19/2019 Yanick Salazar MD  Last physical: Visit date not found Last MTM visit: Visit date not found   Next visit within 3 mo: Visit date not found  Next physical within 3 mo: Visit date not found  Prescriber OR PCP: Yanick Salazar MD  Last diagnosis associated with med order: 1. Hypercholesterolemia  - atorvastatin (LIPITOR) 80 MG tablet [Pharmacy Med Name: ATORVASTATIN 80 MG TABLET]; TAKE 1 TABLET BY MOUTH EVERYDAY AT BEDTIME  Dispense: 90 tablet; Refill: 0    If protocol passes may refill for 12 months if within 3 months of last provider visit (or a total of 15 months).

## 2021-06-16 NOTE — TELEPHONE ENCOUNTER
Refill Approved    Rx renewed per Medication Renewal Policy. Medication was last renewed on 1/5/21.    Duane Garcia, [facility] Triage/Med Refill 3/30/2021     Requested Prescriptions   Pending Prescriptions Disp Refills     amLODIPine (NORVASC) 5 MG tablet [Pharmacy Med Name: AMLODIPINE BESYLATE 5 MG TAB] 90 tablet 0     Sig: TAKE 1 TABLET BY MOUTH EVERY DAY       Calcium-Channel Blockers Protocol Passed - 3/29/2021  1:07 PM        Passed - PCP or prescribing provider visit in past 12 months or next 3 months     Last office visit with prescriber/PCP: 1/26/2021 Lamberto Maddox CNP OR same dept: 1/26/2021 Lamberto Maddox CNP OR same specialty: 1/26/2021 Lamberto Maddox CNP  Last physical: Visit date not found Last MTM visit: Visit date not found   Next visit within 3 mo: Visit date not found  Next physical within 3 mo: Visit date not found  Prescriber OR PCP: Lamberto Maddox CNP  Last diagnosis associated with med order: 1. Essential hypertension  - amLODIPine (NORVASC) 5 MG tablet [Pharmacy Med Name: AMLODIPINE BESYLATE 5 MG TAB]; TAKE 1 TABLET BY MOUTH EVERY DAY  Dispense: 90 tablet; Refill: 0    If protocol passes may refill for 12 months if within 3 months of last provider visit (or a total of 15 months).             Passed - Blood pressure filed in past 12 months     BP Readings from Last 1 Encounters:   01/26/21 162/84

## 2021-06-17 NOTE — PATIENT INSTRUCTIONS - HE
Patient Instructions by Helen Contreras at 3/7/2019  1:15 PM     Author: Helen Contreras Service: -- Author Type: Physician    Filed: 3/7/2019  2:06 PM Encounter Date: 3/7/2019 Status: Addendum    : Helen Contreras    Related Notes: Original Note by Helen Contreras. filed at 3/7/2019  1:51 PM       Let's have you add 5 mg daily of amlodipine.  See Tom in about 2 weeks for a BP follow up.      Patient Education     Eating Heart-Healthy Foods  Eating has a big impact on your heart health. In fact, eating healthier can improve several of your heart risks at once. For instance, it helps you manage weight, cholesterol, and blood pressure. Here are ideas to help you make heart-healthy changes without giving up all the foods and flavors you love.  Getting started    Talk with your healthcare provider about eating plans, such as the DASH or Mediterranean diet. You may also be referred to a dietitian.    Change a few things at a time. Give yourself time to get used to a few eating changes before adding more.    Work to create a tasty, healthy eating plan that you can stick to for the rest of your life.    Goals for healthy eating  Below are some tips to improve your eating habits:    Limit saturated fats and trans fats. Saturated fats raise your levels of cholesterol, so keep these fats to a minimum. They are found in foods such as fatty meats, whole milk, cheese, and palm and coconut oils. Avoid trans fats because they lower good cholesterol as well as raise bad cholesterol. Trans fats are most often found in processed foods.    Reduce sodium (salt) intake. Eating too much salt may increase your blood pressure. Limit your sodium intake to 2,300 milligrams (mg) per day (the amount in 1 teaspoon of salt), or less if your healthcare provider recommends it. Dining out less often and eating fewer processed foods are two great ways to decrease the amount of salt you consume.    Managing calories. A calorie is a unit of  energy. Your body burns calories for fuel, but if you eat more calories than your body burns, the extras are stored as fat. Your healthcare provider can help you create a diet plan to manage your calories. This will likely include eating healthier foods as well as exercising regularly. To help you track your progress, keep a diary to record what you eat and how often you exercise.  Choose the right foods  Aim to make these foods staples of your diet. If you have diabetes, you may have different recommendations than what is listed here:    Fruits and vegetables provide plenty of nutrients without a lot of calories. At meals, fill half your plate with these foods. Split the other half of your plate between whole grains and lean protein.    Whole grains are high in fiber and rich in vitamins and nutrients. Good choices include whole-wheat bread, pasta, and brown rice.    Lean proteins give you nutrition with less fat. Good choices include fish, skinless chicken, and beans.    Low-fat or nonfat dairy provides nutrients without a lot of fat. Try low-fat or nonfat milk, cheese, or yogurt.    Healthy fats can be good for you in small amounts. These are unsaturated fats, such as olive oil, nuts, and fish. Try to have at least 2 servings per week of fatty fish, such as salmon, sardines, mackerel, rainbow trout, and albacore tuna. These contain omega-3 fatty acids, which are good for your heart. Flaxseed is another source of a heart-healthy fat.  More on heart-healthy eating  Read food labels  Healthy eating starts at the grocery store. Be sure to pay attention to food labels on packaged foods. Look for products that are high in fiber and protein, and low in saturated fat, cholesterol, and sodium. Avoid products that contain trans fat. And pay close attention to serving size. For instance, if you plan to eat two servings, double all the numbers on the label.  Prepare food right  A key part of healthy cooking is cutting down  on added fat and salt. Look on the internet for lower-fat, lower-sodium recipes. Also, try these tips:    Remove fat from meat and skin from poultry before cooking.    Skim fat from the surface of soups and sauces.    Broil, boil, bake, steam, grill, and microwave food without added fats.    Choose ingredients that spice up your food without adding calories, fat, or sodium. Try these items: horseradish, hot sauce, lemon, mustard, nonfat salad dressings, and vinegar. For salt-free herbs and spices, try basil, cilantro, cinnamon, pepper, and rosemary.  Date Last Reviewed: 10/1/2017    9904-4343 [employer] [address] [place] [address], [place] [address]. All rights reserved. This information is not intended as a substitute for professional medical care. Always follow your healthcare professional's instructions.

## 2021-06-17 NOTE — TELEPHONE ENCOUNTER
Can you fill this, given that the patient had her routine checkup with labs at an OV with Tom on 1/26 and has a BP f/u with you scheduled for 7/22?

## 2021-06-17 NOTE — TELEPHONE ENCOUNTER
RN cannot approve Refill Request    RN can NOT refill this medication PCP messaged that patient is overdue for Office Visit. Last office visit: 11/19/2019 Yanick Salazar MD Last Physical: Visit date not found Last MTM visit: Visit date not found Last visit same specialty: 11/19/2019 Yanick Salazar MD.  Next visit within 3 mo: Visit date not found  Next physical within 3 mo: Visit date not found      Melanie Pascual, [facility] Triage/Med Refill 5/8/2021    Requested Prescriptions   Pending Prescriptions Disp Refills     losartan (COZAAR) 100 MG tablet [Pharmacy Med Name: LOSARTAN POTASSIUM 100 MG TAB] 90 tablet 0     Sig: TAKE 1 TABLET BY MOUTH EVERY DAY       Angiotensin Receptor Blocker Protocol Failed - 5/8/2021  9:14 AM        Failed - PCP or prescribing provider visit in past 12 months       Last office visit with prescriber/PCP: 11/19/2019 Yanick Salazar MD OR same dept: Visit date not found OR same specialty: 11/19/2019 Yanick Salazar MD  Last physical: Visit date not found Last MTM visit: Visit date not found   Next visit within 3 mo: Visit date not found  Next physical within 3 mo: Visit date not found  Prescriber OR PCP: Yanick Salazar MD  Last diagnosis associated with med order: 1. Essential hypertension  - losartan (COZAAR) 100 MG tablet [Pharmacy Med Name: LOSARTAN POTASSIUM 100 MG TAB]; TAKE 1 TABLET BY MOUTH EVERY DAY  Dispense: 90 tablet; Refill: 0    If protocol passes may refill for 12 months if within 3 months of last provider visit (or a total of 15 months).             Passed - Serum potassium within the past 12 months     Lab Results   Component Value Date    Potassium 4.2 01/05/2021             Passed - Blood pressure filed in past 12 months     BP Readings from Last 1 Encounters:   01/26/21 162/84             Passed - Serum creatinine within the past 12 months     Creatinine   Date Value Ref Range Status   01/05/2021 0.81 0.60 - 1.10 mg/dL Final

## 2021-06-17 NOTE — TELEPHONE ENCOUNTER
Patient is requesting a refill for losartan.  Patient has not been seen since 2019.  Patient will need to make an appointment as soon as possible before refill.  Have patient make appointment for refill.

## 2021-06-19 NOTE — LETTER
Letter by Lamberto Maddox CNP at      Author: Lamberto Maddox CNP Service: -- Author Type: --    Filed:  Encounter Date: 7/17/2019 Status: (Other)         Karen Li  [address]  [place] [address]             July 17, 2019         Dear Ms. Li,    Below are the results from your recent visit:    Resulted Orders   Basic Metabolic Panel   Result Value Ref Range    Sodium 141 136 - 145 mmol/L    Potassium 4.4 3.5 - 5.0 mmol/L    Chloride 105 98 - 107 mmol/L    CO2 30 22 - 31 mmol/L    Anion Gap, Calculation 6 5 - 18 mmol/L    Glucose 102 70 - 125 mg/dL    Calcium 10.2 8.5 - 10.5 mg/dL    BUN 15 8 - 28 mg/dL    Creatinine 0.83 0.60 - 1.10 mg/dL    GFR MDRD Af Amer >60 >60 mL/min/1.73m2    GFR MDRD Non Af Amer >60 >60 mL/min/1.73m2    Narrative    Fasting Glucose reference range is 70-99 mg/dL per  American Diabetes Association (ADA) guidelines.       Kidney labs and electrolytes look normal.  No changes in blood pressure medications for now.    Follow-up with Dr. Lonnie Contreras for establish care visit as originally scheduled.    Please call with questions or contact us using [employer].    Sincerely,        Electronically signed by Lamberto Maddox CNP

## 2021-06-19 NOTE — LETTER
Letter by Yanick Salazar MD at      Author: Yanick Salazar MD Service: -- Author Type: --    Filed:  Encounter Date: 12/4/2019 Status: Signed         Karen Li  [address] [place] [address]             December 4, 2019         Dear Ms. Li,    Below are the results from your recent visit:    Resulted Orders   US Carotid Bilateral    Narrative    EXAM: US CAROTID BILATERAL  LOCATION: [facility]  DATE/TIME: 12/4/2019 11:09 AM    INDICATION: Occlusion and stenosis of right carotid artery  COMPARISON: None.  TECHNIQUE: Duplex exam performed utilizing 2D gray-scale imaging, Doppler interrogation with color-flow and spectral waveform analysis.    FINDINGS:  RIGHT: There is mild atheromatous plaque. Normal waveforms with no significant stenosis.    LEFT: There is mild atheromatous plaque. Normal waveforms with no significant stenosis.    Both vertebral arteries and subclavian artery waveforms are normal.    VELOCITY CHART:  The following velocities were obtained in the RIGHT carotid system.  CCA: 75/21 cm/s  ICA: 88/34 cm/s  ECA: 108 cm/s  ICA/CCA: PS 1.2    The following velocities were obtained in the LEFT carotid system.  CCA: 77/16 cm/s  ICA: 111/40 cm/s  ECA: 117 cm/s  ICA/CCA: PS 1.5      Impression    1. Mild atheromatous plaque in the carotid arteries.  2. No significant stenosis on either side.    Evaluation based on velocities and NASCET criteria.       Just mild plaque but no stenosis seen on carotid ultrasound.  No change in treatment plan.    Please call with questions or contact us using [employer].    Sincerely,        Electronically signed by Yanick Salazar MD

## 2021-06-19 NOTE — LETTER
Letter by Yanick Salazar MD at      Author: Yanick Salazar MD Service: -- Author Type: --    Filed:  Encounter Date: 11/20/2019 Status: Signed         Karen [address]  [place] [address]             November 20, 2019         Dear Ms. Li,    Below are the results from your recent visit:    Resulted Orders   Comprehensive Metabolic Panel   Result Value Ref Range    Sodium 141 136 - 145 mmol/L    Potassium 4.2 3.5 - 5.0 mmol/L    Chloride 103 98 - 107 mmol/L    CO2 29 22 - 31 mmol/L    Anion Gap, Calculation 9 5 - 18 mmol/L    Glucose 103 70 - 125 mg/dL    BUN 17 8 - 28 mg/dL    Creatinine 0.84 0.60 - 1.10 mg/dL    GFR MDRD Af Amer >60 >60 mL/min/1.73m2    GFR MDRD Non Af Amer >60 >60 mL/min/1.73m2    Bilirubin, Total 0.5 0.0 - 1.0 mg/dL    Calcium 10.4 8.5 - 10.5 mg/dL    Protein, Total 7.7 6.0 - 8.0 g/dL    Albumin 4.4 3.5 - 5.0 g/dL    Alkaline Phosphatase 66 45 - 120 U/L    AST 29 0 - 40 U/L    ALT 29 0 - 45 U/L    Narrative    Fasting Glucose reference range is 70-99 mg/dL per  American Diabetes Association (ADA) guidelines.   Lipid Cascade   Result Value Ref Range    Cholesterol 202 (H) <=199 mg/dL    Triglycerides 127 <=149 mg/dL    HDL Cholesterol 74 >=50 mg/dL    LDL Calculated 103 <=129 mg/dL    Patient Fasting > 8hrs? No    HM1 (CBC with Diff)   Result Value Ref Range    WBC 7.4 4.0 - 11.0 thou/uL    RBC 3.98 3.80 - 5.40 mill/uL    Hemoglobin 13.5 12.0 - 16.0 g/dL    Hematocrit 41.0 35.0 - 47.0 %     (H) 80 - 100 fL    MCH 34.0 27.0 - 34.0 pg    MCHC 33.0 32.0 - 36.0 g/dL    RDW 11.3 11.0 - 14.5 %    Platelets 225 140 - 440 thou/uL    MPV 7.9 7.0 - 10.0 fL    Neutrophils % 63 50 - 70 %    Lymphocytes % 25 20 - 40 %    Monocytes % 9 2 - 10 %    Eosinophils % 2 0 - 6 %    Basophils % 0 0 - 2 %    Neutrophils Absolute 4.7 2.0 - 7.7 thou/uL    Lymphocytes Absolute 1.8 0.8 - 4.4 thou/uL    Monocytes Absolute 0.7 0.0 - 0.9 thou/uL    Eosinophils Absolute 0.2 0.0 - 0.4 thou/uL    Basophils  Absolute 0.0 0.0 - 0.2 thou/uL       Kidney and liver tests are normal.  Blood sugar is normal.    Cholesterol levels are well controlled.    Hemoglobin and blood counts are normal.  MCV is stable.    No change in treatment plan.    Please call with questions or contact us using [employer].    Sincerely,        Electronically signed by Yanick Salazar MD

## 2021-06-19 NOTE — LETTER
Letter by Yanick Salazar MD at      Author: Yanick Salazar MD Service: -- Author Type: --    Filed:  Encounter Date: 12/4/2019 Status: Signed         Karen Li  [address] [place] [address]             December 4, 2019         Dear Ms. Li,    Below are the results from your recent visit:    Resulted Orders   CT Chest Over Read    Narrative    EXAM: OVERREAD: DETAILED [facility] EXTRACARDIAC OVERREAD OF CARDIAC CT   LOCATION: [facility]  DATE/TIME: 12/4/2019 10:07 AM    INDICATION: CAD risk, intermediate, asymptomatic  TECHNIQUE: Dose reduction techniques were used.  CONTRAST: None  COMPARISON: None.    FINDINGS:    LIMITED CHEST: Mild subpleural scarring in the right lateral costophrenic sulcus and atelectasis in the left lower lobe along the diaphragm. The imaged airways are normal. Minimal fat-containing posterior medial left diaphragm Bochdalek hernia. No   pleural fluid or thickening is present.  LIMITED MEDIASTINUM: No enlarged mediastinal or hilar lymph nodes.  LIMITED UPPER ABDOMEN: Negative.      Impression    CONCLUSION:    1.  No actionable incidental extracardiac findings.  2.  Please refer to cardiologist's dictation for the cardiac CT report.         No nodule or suspicious findings on chest CT scan.    Please call with questions or contact us using [employer].    Sincerely,        Electronically signed by Yanick Salazar MD

## 2021-06-19 NOTE — PROGRESS NOTES
Assessment and Plan:     1. Hypercholesterolemia  She had her atorvastatin increased last year.  We will recheck her level today.  She is not fasting but that should be fine.  - atorvastatin (LIPITOR) 80 MG tablet; Take 1 tablet (80 mg total) by mouth at bedtime.  Dispense: 90 tablet; Refill: 3  - Comprehensive Metabolic Panel  - Lipid Cascade  - Thyroid Cascade    2. Macrocytosis without anemia  She saw hematology last year.  Her workup was fairly benign.  We will check her blood counts today.  - HM2(CBC w/o Differential)    3. Transient cerebral ischemia  She has been without evidence of recurrence.  She is taking her aspirin every other day now.  She is also taking atorvastatin.  - aspirin 81 MG EC tablet; Take 1 tablet (81 mg total) by mouth daily.; Refill: 0    4. Routine general medical examination at a health care facility  She is up-to-date on age-appropriate cancer screenings and immunizations.  She sees her gynecologist later in July.    5. Elevated blood pressure reading without diagnosis of hypertension  She reports typically having a high initial blood pressure reading.  She gets fairly emotional [place] [employer] is where her  (Michael) passed away.  - Comprehensive Metabolic Panel  - Thyroid Cascade    The patient's current medical problems were reviewed.    The following high BMI interventions were performed this visit: weight monitoring  The following health maintenance schedule was reviewed with the patient and provided in printed form in the after visit summary:   Health Maintenance   Topic Date Due     ZOSTER VACCINE  05/05/2008     INFLUENZA VACCINE RULE BASED (1) 08/01/2018     MAMMOGRAM  06/20/2019     FALL RISK ASSESSMENT  07/05/2019     DXA SCAN  09/07/2019     ADVANCE DIRECTIVES DISCUSSED WITH PATIENT  09/24/2020     COLONOSCOPY  07/10/2023     TD 18+ HE  01/24/2027     PNEUMOCOCCAL POLYSACCHARIDE VACCINE AGE 65 AND OVER  Completed     PNEUMOCOCCAL CONJUGATE VACCINE FOR  ADULTS (PCV13 OR PREVNAR)  Completed        Subjective:   Chief Complaint: Karen Li is an 68 y.o. female here for an Annual Wellness visit.   HPI: Karen is a very pleasant 68-year-old female.  She is transferring care from Dr. Kolb.    She has a past medical history of a TIA and hyperlipidemia.  She is on an aspirin every other day and 80 mg daily of atorvastatin.  She tolerates this without difficulty.  When she took her aspirin daily, she noticed that she would bruise much too easily.    It is been 10 years since her  passed away.  She is managing fairly well.  She continues to actively have dogs and shows.  That keeps her active.  She has a great relationship with their handler.  She stays busy caring for the dogs.    Review of Systems:    Please see above.  The rest of the review of systems are negative for all systems.    Patient Care Team:  Helen Contreras MD as PCP - General (Internal Medicine)  Elizabeth Emmanuel MD as Physician (Obstetrics and Gynecology)     Patient Active Problem List   Diagnosis     Transient Ischemic Attack     Macrocytosis without anemia     Hypercholesterolemia     Squamous cell carcinoma     No past medical history on file.   Past Surgical History:   Procedure Laterality Date     ANKLE SURGERY Left 2002     CATARACT EXTRACTION       HYSTERECTOMY       TOTAL ABDOMINAL HYSTERECTOMY W/ BILATERAL SALPINGOOPHORECTOMY Bilateral 1992      Family History   Problem Relation Age of Onset     Coronary artery disease Father 70     Heart disease Father      Dementia Mother       Social History     Social History     Marital status:      Spouse name: N/A     Number of children: N/A     Years of education: N/A     Occupational History     Not on file.     Social History Main Topics     Smoking status: Never Smoker     Smokeless tobacco: Never Used     Alcohol use Yes      Comment: wine 2-3 glasses of wine a day     Drug use: No     Sexual activity: No     Other Topics Concern  "    Not on file     Social History Narrative      Current Outpatient Prescriptions   Medication Sig Dispense Refill     aspirin 81 MG EC tablet Take 1 tablet (81 mg total) by mouth daily.  0     atorvastatin (LIPITOR) 80 MG tablet Take 1 tablet (80 mg total) by mouth at bedtime. 90 tablet 3     calcium carbonate-vitamin D3 600 mg(1,500mg) -400 unit Chew Chew 1 tablet 2 (two) times a day.       cholecalciferol, vitamin D3, 1,000 unit tablet Take 1,000 Units by mouth daily.       CYANOCOBALAMIN, VITAMIN B-12, (VITAMIN B-12 ORAL) Take 1 tablet by mouth daily.       MULTIVITAMIN ORAL Take 1 tablet by mouth daily.       No current facility-administered medications for this visit.       Objective:   Vital Signs:   Visit Vitals     /86     Pulse 70     Temp 98.1  F (36.7  C)     Resp 12     Ht 5' 5.25\" (1.657 m)     Wt 174 lb 4.8 oz (79.1 kg)     BMI 28.78 kg/m2        VisionScreening:  No exam data present     PHYSICAL EXAM:  Constitutional:  Reveals an alert, pleasant adult female.   Vitals:  Noted.   Ears: TM's normal bilaterally   Eyes: PERRL, conjunctiva/corneas clear, EOM's intact   Throat: Lips, mucosa, and tongue normal; teeth and gums normal   Neck: Normal ROM, no carotid bruits, no thyromegaly   Lungs: Clear to auscultation bilaterally, respirations unlabored.   Breast exam:  Normal skin overlying her breasts bilaterally no discrete nodule is palpable in the axilla bilaterally  Heart: Regular rate and rhythm, S1 and S2 normal, no murmur, rub, or gallop,   Abdomen: Soft, non-tender, bowel sounds active, no masses, no organomegaly   Extremities: Extremities normal, atraumatic, no cyanosis or edema   Pelvic:Not examined  Skin: Skin color, texture, turgor normal, no rashes or lesions   Neurologic: Normal       Assessment Results 7/5/2018   Activities of Daily Living No help needed   Instrumental Activities of Daily Living No help needed   Mini Cog Total Score 5   Some recent data might be hidden     A Mini-Cog " score of 0-2 suggests the possibility of dementia, score of 3-5 suggests no dementia    Identified Health Risks:     The patient reports that she drinks more than one alcoholic drink per day but denies binge or excessive drinking. She was counseled and given information about possible harmful effects of excessive alcohol intake.  Patient's advanced directive was discussed and I am comfortable with the patient's wishes.

## 2021-06-19 NOTE — LETTER
Letter by Yanick Salazar MD at      Author: Yanick Salazar MD Service: -- Author Type: --    Filed:  Encounter Date: 12/4/2019 Status: Signed         Karen Li  [address] [place] [address]             December 4, 2019         Dear Ms. Li,    Below are the results from your recent visit:    Resulted Orders   CT Cardiac Calcium Score   Result Value Ref Range    Agatston Score of Left Main 0     Agatston Score of the Left Anterior Descending 198     Agatston Score of Circumflex 57     Agatston Score of Right Coronary Artery 0     Total Score 255.00     Narrative      The total Agatston calcium score is 255. A calcium score in this range   places the individual in the 75th percentile when compared to an age and   gender matched control group and implies a high risk of cardiac events in   the next ten years.          You do have a significantly elevated cardiac calcium score of 255.  Continue on your current atorvastatin and aspirin.  No change in treatment plan.    Please call with questions or contact us using [employer].    Sincerely,        Electronically signed by Yanick Salazar MD

## 2021-06-20 NOTE — PROGRESS NOTES
"ASSESSMENT and PLAN:  1. HTN (hypertension)  Improved control, but not ideal.  She'd like more time to get this under control.  She'll due a CA BP check in one month.  If it's still >140/90, I'll add lisinopril 10 mg and have her see me in two weeks.  - Basic Metabolic Panel      There are no discontinued medications.    No Follow-up on file.    Patient Instructions   Today's labs will be available on [employer].  If you aren't signed up, then we'll mail them out.  If anything needs more immediate follow up, we'll also call.        CHIEF COMPLAINT:  Chief Complaint   Patient presents with     Hypertension     No problems or side effects from new med       HISTORY OF PRESENT ILLNESS:  Karen Li is a 68 y.o. female  presenting to the clinic today for follow up of her HTN.  When I saw her last on September 11, her blood pressure here was 192/100.  She had some elevated readings at her gynecologist office.  We elected to add chlorthalidone.  She denies any side effects of the medication.  She doesn't check BP at home due to issues w/ her machine.    She's hoping to be expecting puppies in November.      REVIEW OF SYSTEMS:    All other systems are negative.    PFSH:  Family History   Problem Relation Age of Onset     Coronary artery disease Father 70     Heart disease Father      Dementia Mother        TOBACCO USE:  History   Smoking Status     Never Smoker   Smokeless Tobacco     Never Used       VITALS:  Vitals:    09/27/18 0914 09/27/18 1005   BP: 148/60 144/76   Patient Site: Right Arm    Patient Position: Sitting    Cuff Size: Adult Large    Pulse: 60    Weight: 168 lb 1.6 oz (76.2 kg)    Height: 5' 5.25\" (1.657 m)      Wt Readings from Last 3 Encounters:   09/27/18 168 lb 1.6 oz (76.2 kg)   09/11/18 172 lb 8 oz (78.2 kg)   07/05/18 174 lb 4.8 oz (79.1 kg)     PHYSICAL EXAM:  Constitutional:  Reveals an alert, pleasant adult female.   Vitals:  Noted.     QUALITY MEASURES:  The following high BMI interventions were " performed this visit: weight monitoring    MEDICATIONS:  Current Outpatient Prescriptions   Medication Sig Dispense Refill     aspirin 81 MG EC tablet Take 1 tablet (81 mg total) by mouth daily.  0     atorvastatin (LIPITOR) 80 MG tablet Take 1 tablet (80 mg total) by mouth at bedtime. 90 tablet 3     calcium carbonate-vitamin D3 600 mg(1,500mg) -400 unit Chew Chew 1 tablet 2 (two) times a day.       chlorthalidone (HYGROTEN) 25 MG tablet Take 1 tablet (25 mg total) by mouth daily. 30 tablet 1     cholecalciferol, vitamin D3, 1,000 unit tablet Take 1,000 Units by mouth daily.       CYANOCOBALAMIN, VITAMIN B-12, (VITAMIN B-12 ORAL) Take 1 tablet by mouth daily.       MULTIVITAMIN ORAL Take 1 tablet by mouth daily.       No current facility-administered medications for this visit.

## 2021-06-20 NOTE — PROGRESS NOTES
ASSESSMENT and PLAN:  1. HTN (hypertension)  She has hypertension based on multiple readings of elevated blood pressure throughout history.  We discussed the potential side effects of medication.  She agrees to start chlorthalidone.  We will see her back in 2 weeks with a blood pressure recheck and nonfasting labs.  She will contact us in the interim if she has any other symptoms.  - chlorthalidone (HYGROTEN) 25 MG tablet; Take 1 tablet (25 mg total) by mouth daily.  Dispense: 30 tablet; Refill: 1    She will get her flu shot today.    There are no discontinued medications.    No Follow-up on file.    Patient Instructions   I sent in a medication to help your BP.    Please see me back in two weeks for a BP recheck and non-fasting labs.      Contact us earlier as needed.    Take care!      CHIEF COMPLAINT:  Chief Complaint   Patient presents with     Follow-up     F/U - High BP. Had high BP readings twice at her annual OBGYN about a month ago (around 07/25). Purchased BP cuff but could not get it to work.        HISTORY OF PRESENT ILLNESS:  Karen Li is a 68 y.o. female  presenting to the clinic today for elevated BP readings.  It was checked at her annual OB/GYN visit about 1 month ago.  It was high both times there.  She tried checking it at home but could not get her blood pressure cuff to work.  She even took it into the medical supply company and was not able to get an accurate reading.  She is here today to follow-up on that.  She has no prior history of hypertension.  Historically, it is not been uncommon for her to have an elevated blood pressure reading initially when checked here.  It will usually come down into a more normal range on recheck.    Looking back through her chart, there are times even back to 2012 where her blood pressure has been in a hypertensive range.  It has been intermittent but it has been there.  We discussed options and she is open to medication.  She has no symptoms related to  her elevated blood pressure readings.  She even tried very hard to identify something that was different but could not.    REVIEW OF SYSTEMS:    All other systems are negative.    PFSH:  Family History   Problem Relation Age of Onset     Coronary artery disease Father 70     Heart disease Father      Dementia Mother        TOBACCO USE:  History   Smoking Status     Never Smoker   Smokeless Tobacco     Never Used       VITALS:  Vitals:    09/11/18 0820   BP: (!) 192/100   Patient Site: Right Arm   Patient Position: Sitting   Cuff Size: Adult Large   Pulse: 76   Resp: 16   Weight: 172 lb 8 oz (78.2 kg)     Wt Readings from Last 3 Encounters:   09/11/18 172 lb 8 oz (78.2 kg)   07/05/18 174 lb 4.8 oz (79.1 kg)   04/04/17 168 lb 12.8 oz (76.6 kg)         PHYSICAL EXAM:  Constitutional:  Reveals an alert, pleasant adult female.   Vitals:  Noted.     QUALITY MEASURES:  The following high BMI interventions were performed this visit: weight monitoring    MEDICATIONS:  Current Outpatient Prescriptions   Medication Sig Dispense Refill     aspirin 81 MG EC tablet Take 1 tablet (81 mg total) by mouth daily.  0     atorvastatin (LIPITOR) 80 MG tablet Take 1 tablet (80 mg total) by mouth at bedtime. 90 tablet 3     calcium carbonate-vitamin D3 600 mg(1,500mg) -400 unit Chew Chew 1 tablet 2 (two) times a day.       chlorthalidone (HYGROTEN) 25 MG tablet Take 1 tablet (25 mg total) by mouth daily. 30 tablet 1     cholecalciferol, vitamin D3, 1,000 unit tablet Take 1,000 Units by mouth daily.       CYANOCOBALAMIN, VITAMIN B-12, (VITAMIN B-12 ORAL) Take 1 tablet by mouth daily.       MULTIVITAMIN ORAL Take 1 tablet by mouth daily.       No current facility-administered medications for this visit.

## 2021-06-21 NOTE — PROGRESS NOTES
I met with Karen Li at the request of Dr. Helen Contreras to recheck her blood pressure.  Blood pressure medications on the MAR were reviewed with patient.    Patient has taken all medications as per usual regimen: Patient doesn't know what BP medication she is on.  Patient reports tolerating them without any issues or concerns: Yes    Vitals:    10/16/18 0928 10/16/18 0943   BP: 172/88 168/86   Patient Site: Right Arm Right Arm   Patient Position: Sitting Sitting   Cuff Size: Adult Large Adult Large   Pulse: 68 68       After 5 minutes, the patient's blood pressure remained greater than or equal to 140/90.    Is the patient currently having any chest pain?  No  Does the patient currently have a headache?   No  Does the patient currently have any vision changes? No  Does the patient currently have any nausea? No  Does the patient currently have any abdominal pain? No    Per Dr. Helen Contreras, patient will see Tom Maddox NP today to asses her medications.

## 2021-06-21 NOTE — PROGRESS NOTES
Clinic Note    Assessment:     Assessment and Plan:  1. HTN (hypertension)   her blood pressure is 170/96 today while on the lisinopril 10 mg.  Plan is to have her increase her medication to 20 mg, pending a basic metabolic panel today to recheck potassium, and renal function.  She will schedule an appointment to see us again in 2 weeks for BP check.  See patient instructions below.  - Basic Metabolic Panel       Patient Instructions   We will recheck some lab work today.  We need to make sure that your kidney function, and electrolytes have stabilized since starting the new medication.    I will call you when the results of today's lab work come back to me.  Should everything be okay, we will increase your lisinopril to 20 mg daily.  I will send a new prescription into your pharmacy.    We will schedule you for a follow-up blood pressure check in 2 weeks from today.    No Follow-up on file.         Subjective:      Patient comes the clinic today for follow-up of her hypertension.    She was initially seen by her PCP on 9/11/2018 for follow-up after an annual wellness exam.    At that time her blood pressure was 192/100.  She was started on chlorthalidone 25 mg.  At a follow-up visit she was noted to still be above goal and there was some discussion about adding lisinopril to her regimen.  However, a basic metabolic panel showed a potassium of 3.3 as well as an elevated calcium level.  It was recommended that she stop the chlorthalidone, but to continue with lisinopril 10 mg.    She had a follow-up appointment for a nurse only BP check this morning.  At that time her blood pressure was 168/86.  In the exam room it was 170/96.  Patient is asymptomatic.  No chest pain or shortness of breath.  No headache or dizziness.  No swelling of the lower extremities.  Her weight is stable.  No dyspnea on exertion.    She does not note any side effects from the lisinopril.  No cough.    The following portions of the patient's  history were reviewed and updated as appropriate: Allergies, medications, problems, prior note.    Review of Systems:    Review is otherwise negative except for what is mentioned above.     Social Hx:    History   Smoking Status     Never Smoker   Smokeless Tobacco     Never Used         Objective:     Vitals:    10/16/18 0957   BP: (!) 170/96   Pulse: 65   SpO2: 97%   Weight: 172 lb (78 kg)       Exam:    General: No apparent distress. Calm. Alert and Oriented X3. Pt behavior is appropriate.  Chest/Lungs: Normal chest wall, clear to auscultation, normal respiratory effort and rate.   Heart/Pulses: Regular rate and rhythm, strong and equal radial pulses, no murmurs. Capillary refill <2 seconds. No edema.   Neurologic: Interactive, alert, no focal findings, CNs intact.   Skin: Warm, dry. Normal hair pattern. Free of lesions. Normal skin turgor.       Patient Active Problem List   Diagnosis     Transient Ischemic Attack     Macrocytosis without anemia     Hypercholesterolemia     Squamous cell carcinoma     HTN (hypertension)     Current Outpatient Prescriptions   Medication Sig Dispense Refill     aspirin 81 MG EC tablet Take 1 tablet (81 mg total) by mouth daily.  0     atorvastatin (LIPITOR) 80 MG tablet Take 1 tablet (80 mg total) by mouth at bedtime. 90 tablet 3     calcium carbonate-vitamin D3 600 mg(1,500mg) -400 unit Chew Chew 1 tablet 2 (two) times a day.       chlorthalidone (HYGROTEN) 25 MG tablet Take 1 tablet (25 mg total) by mouth daily. 30 tablet 1     cholecalciferol, vitamin D3, 1,000 unit tablet Take 1,000 Units by mouth daily.       CYANOCOBALAMIN, VITAMIN B-12, (VITAMIN B-12 ORAL) Take 1 tablet by mouth daily.       lisinopril (PRINIVIL,ZESTRIL) 10 MG tablet Take 1 tablet (10 mg total) by mouth daily. 30 tablet 11     MULTIVITAMIN ORAL Take 1 tablet by mouth daily.       No current facility-administered medications for this visit.        I spent 30 minutes with patient face to face, of which >50%  was counseling regarding the above plan       Lamberto Maddox (Rob), CNP    10/16/2018

## 2021-06-21 NOTE — PROGRESS NOTES
Clinic Note    Assessment:     Assessment and Plan:  1. Essential hypertension  Her blood pressure still outside of normal range today.  Plan is to have her increase her lisinopril to 40 mg and follow-up with me in 1 month.    - lisinopril (PRINIVIL,ZESTRIL) 40 MG tablet; Take 1 tablet (40 mg total) by mouth daily.  Dispense: 90 tablet; Refill: 2       Patient Instructions   Increase your lisinopril to 40 mg.    I sent in a new prescription to your pharmacy for a 40 mg tablet.    Follow-up with me in 1 month for a repeat blood pressure check.  We will need to do nonfasting lab work at that time.    In the meantime, try and find a new blood pressure cuff that is more accurate.  Often times he can order these online and check the reviews to make sure that they are reputable machines.    Return in about 1 month (around 12/13/2018).         Subjective:      Patient comes the clinic today for follow-up of her hypertension.    I last saw the patient approximately 2 weeks ago.     At that time her blood pressure was 140/90.  This was despite increasing her lisinopril from 10 mg to 20 mg.  She was originally on chlorthalidone 25 mg, but experienced some hypokalemia as well as elevated calcium level.  Dr. Contreras had her stop her chlorthalidone but started her on lisinopril 10 mg.    Today, her blood pressure is 160/80.  She is asymptomatic in the exam room.  She had bought a blood pressure cuff at a medical supply store but has been having issues with its accuracy.  The cuff is reading out to measurements as high as 400/300.  She went to get a new cuff, but continued to have issues.  Because of this, she has not been routinely checking her blood pressure.    The following portions of the patient's history were reviewed and updated as appropriate: Allergies, medications, problems, prior note.    Review of Systems:    Review is otherwise negative except for what is mentioned above.     Social Hx:    Social History     Tobacco  Use   Smoking Status Never Smoker   Smokeless Tobacco Never Used         Objective:     Vitals:    11/13/18 0931   BP: 160/80   Pulse: 62   Weight: 162 lb (73.5 kg)       Exam:    General: No apparent distress. Calm. Alert and Oriented X3. Pt behavior is appropriate.      Patient Active Problem List   Diagnosis     Transient Ischemic Attack     Macrocytosis without anemia     Hypercholesterolemia     Squamous cell carcinoma     HTN (hypertension)     Current Outpatient Medications   Medication Sig Dispense Refill     aspirin 81 MG EC tablet Take 1 tablet (81 mg total) by mouth daily.  0     atorvastatin (LIPITOR) 80 MG tablet Take 1 tablet (80 mg total) by mouth at bedtime. 90 tablet 3     calcium carbonate-vitamin D3 600 mg(1,500mg) -400 unit Chew Chew 1 tablet 2 (two) times a day.       cholecalciferol, vitamin D3, 1,000 unit tablet Take 1,000 Units by mouth daily.       CYANOCOBALAMIN, VITAMIN B-12, (VITAMIN B-12 ORAL) Take 1 tablet by mouth daily.       lisinopril (PRINIVIL,ZESTRIL) 20 MG tablet Take 1 tablet (20 mg total) by mouth daily. 90 tablet 1     MULTIVITAMIN ORAL Take 1 tablet by mouth daily.       chlorthalidone (HYGROTEN) 25 MG tablet Take 1 tablet (25 mg total) by mouth daily. 30 tablet 1     No current facility-administered medications for this visit.        I spent 20 minutes with patient face to face, of which >50% was counseling regarding the above plan       Lamberto Maddox (Rob), [facility]    11/13/2018

## 2021-06-21 NOTE — PROGRESS NOTES
Clinic Note    Assessment:     Assessment and Plan:  1. HTN (hypertension)   She does not want to make any changes to her medications at this time.  She would like to come back for a blood pressure check in 2 weeks and discuss whether or not she needs to increase her lisinopril at that time.       Patient Instructions   We will not make any changes to your blood pressure medications today.    We will get you scheduled to follow-up with me in 2 weeks for another BP check.    In general, we want your blood pressure to be less than 140/90.  If at your follow-up appointment, your blood pressure is not at goal, we may need to increase your lisinopril at that time.    Plan for nonfasting blood work at your next appointment.    No Follow-up on file.         Subjective:      Patient comes the clinic today for follow-up of her hypertension.    I last saw the patient 2 weeks ago.  At that time her blood pressure was 170/96.  She was using lisinopril 10 mg.    We increased her lisinopril to 20 mg and had her follow-up for a repeat blood pressure check.    Today, patient's blood pressure is 160/90, on recheck, it came down to 152/88.  The patient remains completely asymptomatic in the exam room.  No chest pain or shortness of breath.  No headaches or dizziness.  No vision disturbance.  No increased swelling in lower extremities.    She is taking her chlorthalidone and lisinopril as prescribed.    The following portions of the patient's history were reviewed and updated as appropriate: Allergies, medications, problems, prior note.    Review of Systems:    Review is otherwise negative except for what is mentioned above.     Social Hx:    History   Smoking Status     Never Smoker   Smokeless Tobacco     Never Used         Objective:     Vitals:    10/30/18 0923   BP: 160/90   Pulse: 69   SpO2: 97%   Weight: 162 lb (73.5 kg)       Exam:    General: No apparent distress. Calm. Alert and Oriented X3. Pt behavior is  appropriate.  Chest/Lungs: Normal chest wall, clear to auscultation, normal respiratory effort and rate.   Heart/Pulses: Regular rate and rhythm, strong and equal radial pulses, no murmurs. Capillary refill <2 seconds. No edema.   Neurologic: Interactive, alert, no focal findings, CNs intact.   Skin: Warm, dry. Normal hair pattern. Free of lesions. Normal skin turgor.       Patient Active Problem List   Diagnosis     Transient Ischemic Attack     Macrocytosis without anemia     Hypercholesterolemia     Squamous cell carcinoma     HTN (hypertension)     Current Outpatient Prescriptions   Medication Sig Dispense Refill     aspirin 81 MG EC tablet Take 1 tablet (81 mg total) by mouth daily.  0     atorvastatin (LIPITOR) 80 MG tablet Take 1 tablet (80 mg total) by mouth at bedtime. 90 tablet 3     calcium carbonate-vitamin D3 600 mg(1,500mg) -400 unit Chew Chew 1 tablet 2 (two) times a day.       chlorthalidone (HYGROTEN) 25 MG tablet Take 1 tablet (25 mg total) by mouth daily. 30 tablet 1     cholecalciferol, vitamin D3, 1,000 unit tablet Take 1,000 Units by mouth daily.       CYANOCOBALAMIN, VITAMIN B-12, (VITAMIN B-12 ORAL) Take 1 tablet by mouth daily.       lisinopril (PRINIVIL,ZESTRIL) 20 MG tablet Take 1 tablet (20 mg total) by mouth daily. 90 tablet 1     MULTIVITAMIN ORAL Take 1 tablet by mouth daily.       No current facility-administered medications for this visit.        I spent 25 minutes with patient face to face, of which >50% was counseling regarding the above plan       Lamberto Maddox CNP (Rob)    10/30/2018

## 2021-06-22 NOTE — PROGRESS NOTES
Clinic Note    Assessment:     Assessment and Plan:  1. Benign essential hypertension  Blood pressure is 144/66 today.  We talked about adding amlodipine 2.5 mg; she is hesitant about adding another medication to her regimen.  Instead, she would like to increase her physical activity, eat a healthier diet, and try and lose 10 pounds over the next 3 months.  Going to have her schedule an appointment to come back and see us in 3 months-at that time we will gather a BP measurement as well as a weight check.  Please see patient instructions below.  - Basic Metabolic Panel       Patient Instructions   I was able to bring her blood pressure down to 144/66.  This is still a little bit too high.  We talked about adding another blood pressure medication called amlodipine.    Instead, we will opt to have you increase your physical activity and try to lose 10 pounds over the next 3 months.  We will get you back for a weight check and BP check at that time.    If your blood pressure continues to be elevated at that time, we will need to add another BP medication.    We will recheck your kidney function and electrolytes today.  I will send you a lab letter with the results.    Return in about 3 months (around 3/12/2019).         Subjective:      Patient comes to clinic today for follow-up of her hypertension.    I last saw the patient 1 month ago.  At that time we increased her lisinopril from 20 mg to 40 mg daily.  She had tried chlorthalidone in the past, this led to hypokalemia and elevated calcium levels.  She was initially started on lisinopril 10 mg, but we have had to titrate up due to uncontrolled hypertension.    Today, her blood pressure was 150/70 upon rooming her.  After waiting for a few minutes it came down to 144/66.    Patient states she is tolerating the lisinopril well.  She is hesitant about adding another blood pressure medication.  She wonders about nonpharmacological ways of managing her blood pressure.   She exercises 3 or 4 days/week right now, but says that her exercise routine is not as robust as it should be.  She used to exercise most days per week.  She deals with some chronic back pain and this makes weight lifting difficult for her.  She does not necessarily like the gym that she belongs to.  She tries to eat as healthy as she can.  She drinks 1-2 glasses of wine per night-she is hesitant about giving this up.  She knows that she needs to watch her sodium intake.    The following portions of the patient's history were reviewed and updated as appropriate: Allergies, medications, problems, prior note.    Review of Systems:    Review is otherwise negative except for what is mentioned above.     Social Hx:    Social History     Tobacco Use   Smoking Status Never Smoker   Smokeless Tobacco Never Used         Objective:     Vitals:    12/12/18 0922   BP: 150/70   Pulse: 72   Weight: 176 lb 4.8 oz (80 kg)       Exam:    General: No apparent distress. Calm. Alert and Oriented X3. Pt behavior is appropriate.      Patient Active Problem List   Diagnosis     Transient Ischemic Attack     Macrocytosis without anemia     Hypercholesterolemia     Squamous cell carcinoma     HTN (hypertension)     Current Outpatient Medications   Medication Sig Dispense Refill     aspirin 81 MG EC tablet Take 1 tablet (81 mg total) by mouth daily.  0     atorvastatin (LIPITOR) 80 MG tablet Take 1 tablet (80 mg total) by mouth at bedtime. 90 tablet 3     calcium carbonate-vitamin D3 600 mg(1,500mg) -400 unit Chew Chew 1 tablet 2 (two) times a day.       cholecalciferol, vitamin D3, 1,000 unit tablet Take 1,000 Units by mouth daily.       CYANOCOBALAMIN, VITAMIN B-12, (VITAMIN B-12 ORAL) Take 1 tablet by mouth daily.       lisinopril (PRINIVIL,ZESTRIL) 40 MG tablet Take 1 tablet (40 mg total) by mouth daily. 90 tablet 2     MULTIVITAMIN ORAL Take 1 tablet by mouth daily.       No current facility-administered medications for this visit.         I spent 25 minutes with patient face to face, of which >50% was counseling regarding the above plan       Lamberto Maddxo CNP (Rob)    12/12/2018

## 2021-06-24 ENCOUNTER — RECORDS - HEALTHEAST (OUTPATIENT)
Dept: INTERNAL MEDICINE | Facility: CLINIC | Age: 71
End: 2021-06-24

## 2021-06-24 DIAGNOSIS — E78.00 PURE HYPERCHOLESTEROLEMIA: ICD-10-CM

## 2021-06-24 RX ORDER — ATORVASTATIN CALCIUM 80 MG/1
TABLET, FILM COATED ORAL
Qty: 90 TABLET | Refills: 0 | Status: SHIPPED | OUTPATIENT
Start: 2021-06-24 | End: 2021-09-07

## 2021-06-25 NOTE — PROGRESS NOTES
Progress Notes by Piedad Ng PT at 2/7/2017 11:00 AM     Author: Piedad Ng PT Service: -- Author Type: Physical Therapist    Filed: 4/26/2017  8:52 AM Encounter Date: 2/7/2017 Status: Addendum    : Piedad Ng PT (Physical Therapist)    Related Notes: Original Note by iPedad Ng PT (Physical Therapist) filed at 2/7/2017  6:27 PM       [facility] Discharge Summary  Patient Name: Karen Li  Date: 4/26/2017  Referral Diagnosis: RTC sprain  Referring provider: Sarabjit Kolb MD  Visit Diagnosis:   1. Left shoulder pain     2. Shoulder weakness     3. Poor posture     4. Chronic left shoulder pain     5. Acute pain of left shoulder         Goals: Goals not met- pt only returned for one visit  Pt. will demonstrate/verbalize independence in self-management of condition in : 6 weeks  Pt will: demonstrate full shoulder ROM for lifting and dressing in 6 weeks with <2/10 pain.   Pt will: be able to lift 10 lbs overhead with <2/10 pain to return to recreational activities.     Patient was seen for 2 visits from 1/27/17 to 2/7/17 with 0 missed appointments.  Patient received a home program See below   The patient discontinued therapy, did not return.    Therapy will be discontinued at this time.  The patient will need a new referral to resume.    Thank you for your referral.  Piedad Ng  4/26/2017  8:48 AM  [facility]   Shoulder Follow up    Patient Name: Karen Li  Date of evaluation: 2/7/2017  Referral Diagnosis: Rotator cuff sprain  Referring provider: Sarabjit Kolb MD  Visit: 2/10  Visit Diagnosis:     ICD-10-CM    1. Left shoulder pain M25.512    2. Shoulder weakness M62.81    3. Poor posture R29.3    4. Chronic left shoulder pain M25.512     G89.29        Assessment:     Pt demonstrates B rounded shoulders this day due to over strong pec and weak rhomboids and lower traps.  Pt able to demonstrate all home exercises well before end of  session. Pt verbalized and demonstrated understanding of proper body mechanics with dog grooming at home. Pt is to continue with HEP at home and return in 2 weeks if shoulder is not better.  Pt would benefit from skilled physical therapy in order to address shoulder ER weakness, pec length, and posterior capsule tightness for improved shoulder positioning, stability, and decreased pain for functional movements and lifting.   Pt. is appropriate for skilled PT intervention as outlined in the Plan of Care (POC).  Pt. is a good candidate for skilled PT services to improve pain levels and function.    Goals:  Pt. will demonstrate/verbalize independence in self-management of condition in : 6 weeks  Pt will: demonstrate full shoulder ROM for lifting and dressing in 6 weeks with <2/10 pain.   Pt will: be able to lift 10 lbs overhead with <2/10 pain to return to recreational activities.     Patient's expectations/goals are realistic.    Barriers to Learning or Achieving Goals:  No Barriers.       Plan / Patient Instructions:      Plan for next visit: Pec stretching, posterior capsule stretching, thoracic extension  .   Subjective:       I have been doing the exercises religiously at home with the exception of the pec stretch as I find it hard to locate an uncluttered corner in my house. I find myself being able to sleep on that side and use the arm more than before with no pain. I just worked out today and I found that I had pain in the outside of my arm. I also have pain after grooming the dogs from a low table     Functional limitations are described as occurring with:    - lifting,    - donning coat   - planks   - ROM       Objective:          Treatment Today    TREATMENT MINUTES COMMENTS   Evaluation     Self-care/ Home management     Manual therapy  Prone pec stretching L UE   Neuromuscular Re-education     Therapeutic Activity 8 Discussed body mechanics when pt is grooming her dogs and how to adjust her posture in UE  and LE to better support spine and scapulae    Therapeutic Exercises 20 Review HEP: pt was performing shoulder ER and IR perpendicular to theraband instead of parallel as a misunderstanding and forgetting how it was practiced in physical therapy.   PT added:   supine stretching of pecs over foam roller, passive and active with B shoulder F and ABD 10 x each  Bent over rows with 2 lbs x 10 reps x 2 sets with LUE   Gait training     Modality__________________                Total 28    Blank areas are intentional and mean the treatment did not include these items.     HEP:             PT Evaluation Code: (Please list factors)  Patient History/Comorbidities: TIA Hx., Hypercholesterolemia  Examination: L shoulder, ROM, Strength  Clinical Presentation: Stable  Clinical Decision Making: Low    Patient History/  Comorbidities Examination  (body structures and functions, activity limitations, and/or participation restrictions) Clinical Presentation Clinical Decision Making (Complexity)   No documented Comorbidities or personal factors 1-2 Elements Stable and/or uncomplicated Low   1-2 documented comorbidities or personal factor 3 Elements Evolving clinical presentation with changing characteristics Moderate   3-4 documented comorbidities or personal factors 4 or more Unstable and unpredictable High         Piedad Ng  2/7/2017  11:33 AM

## 2021-06-25 NOTE — PROGRESS NOTES
Progress Notes by Piedad Ng PT at 1/27/2017 11:30 AM     Author: Piedad Ng PT Service: -- Author Type: Physical Therapist    Filed: 1/27/2017  4:24 PM Encounter Date: 1/27/2017 Status: Signed    : Piedad Ng PT (Physical Therapist) Cosigner: Sarabjit Kolb at 1/30/2017  8:44 AM            [facility] Certification Request    January 27, 2017      Patient: Karen Li  MR Number: [id number]  YOB: 1950  Date of Visit: 1/27/2017      Dear Dr. Kolb    Thank you for this referral.   We are seeing Karen Li for Physical Therapy of L shoulder.    Medicare and/or Medicaid requires physician review and approval of the treatment plan. Please review the plan of care and verify that you agree with the therapy plan of care by co-signing this note.      Plan of Care  Authorization / Certification Start Date: 01/27/17  Authorization / Certification End Date: 04/27/17  Authorization / Certification Number of Visits: 10  Communication with: Referral Source  Patient Related Instruction: Nature of Condition;Treatment plan and rationale;Posture;Basis of treatment;Body mechanics  Times per Week: 1  Number of Weeks: 10  Number of Visits: 10  Therapeutic Exercise: ROM;Stretching;Strengthening  Neuromuscular Reeducation: kinesio tape;posture;balance/proprioception;TNE;postural restoration;core  Manual Therapy: soft tissue mobilization;myofascial release;joint mobilization;muscle energy;strain counterstrain  Modalities: electrical stimulation;TENS    Goals:  Pt. will demonstrate/verbalize independence in self-management of condition in : 6 weeks  Pt will: demonstrate full shoulder ROM for lifting and dressing in 6 weeks with <2/10 pain.   Pt will: be able to lift 10 lbs overhead with <2/10 pain to return to recreational activities.       If you have any questions or concerns, please don't hesitate to call.    Sincerely,      Piedad Ng PT        Physician  recommendation:     ___ Follow therapist's recommendation        ___ Modify therapy      *Physician co-signature indicates they certify the need for these services furnished within this plan and while under their care.      [facility]   Shoulder Initial Evaluation    Patient Name: Karen Li  Date of evaluation: 1/27/2017  Referral Diagnosis: Rotator cuff sprain  Referring provider: Sarabjit Kolb MD  Visit: 1/10  Visit Diagnosis:     ICD-10-CM    1. Left shoulder pain M25.512    2. Shoulder weakness M62.81    3. Poor posture R29.3    4. Chronic left shoulder pain M25.512     G89.29        Assessment:     Pt demonstrates limited L shoulder F and ABD due to rounded L shoulder and shoulder ER weakness with compounding pec decreased muscle length. Pt would benefit from skilled physical therapy in order to address shoulder ER weakness, pec length, and posterior capsule tightness for improved shoulder positioning, stability, and decreased pain for functional movements and lifting.   Pt. is appropriate for skilled PT intervention as outlined in the Plan of Care (POC).  Pt. is a good candidate for skilled PT services to improve pain levels and function.    Goals:  Pt. will demonstrate/verbalize independence in self-management of condition in : 6 weeks  Pt will: demonstrate full shoulder ROM for lifting and dressing in 6 weeks with <2/10 pain.   Pt will: be able to lift 10 lbs overhead with <2/10 pain to return to recreational activities.     Patient's expectations/goals are realistic.    Barriers to Learning or Achieving Goals:  No Barriers.       Plan / Patient Instructions:        Plan of Care:   Authorization / Certification Start Date: 01/27/17  Authorization / Certification End Date: 04/27/17  Authorization / Certification Number of Visits: 10  Communication with: Referral Source  Patient Related Instruction: Nature of Condition;Treatment plan and rationale;Posture;Basis of treatment;Body  mechanics  Times per Week: 1  Number of Weeks: 10  Number of Visits: 10  Therapeutic Exercise: ROM;Stretching;Strengthening  Neuromuscular Reeducation: kinesio tape;posture;balance/proprioception;TNE;postural restoration;core  Manual Therapy: soft tissue mobilization;myofascial release;joint mobilization;muscle energy;strain counterstrain  Modalities: electrical stimulation;TENS    POC and pathology of condition were reviewed with patient.  Pt. is in agreement with the Plan of Care  A Home Exercise Program (HEP) was initiated today.  Pt. was instructed in exercises by PT and patient was given a handout with detailed instructions.  Plan for next visit: Pec stretching, posterior capsule stretching, thoracic extension  .   Subjective:         History of Present Illness:    Karen is a 66 y.o. female who presents to therapy today with complaints of L shoulder pain after falling on 10/1/16. Fell from a standing position on her deck and tripped over her dog onto her L side.   - difficulty lifting weights   - decreased ROM   - just had cataracts surgery   - putting coat on    - planks    - pain is improving   - works out every day; active motions is what makes her feel it    - MWF: warm up elliptical or bike; balance exercises; exercises for 50+; Bicep then shrugs, then pecs pushing; bicep alternating curls, triceps, back extension with breathing; Saturday is core.  Wall push ups, arm circles,        Symptoms are intermittent and not improving. She denies history of similar symptoms. She describes their previous level of function as not limited    Pain Ratin  Pain rating at best: 2 with rest  Pain rating at worst: 2 with movement  Pain description: aching and soreness    Functional limitations are described as occurring with:    - lifting,    - donning coat   - planks   - ROM       Objective:      Note: Items left blank indicates the item was not performed or not indicated at the time of the evaluation.    Patient  Outcome Measures :    Shoulder Pain and Disability Index (SPADI) in %: 11.5   Scores range from 0-100%, where a score of 0% represents minimal pain and maximal function. The minimal clincically important difference is a score reduction of 10%.    Shoulder Examination  1. Left shoulder pain     2. Shoulder weakness     3. Poor posture     4. Chronic left shoulder pain       Involved side: Left  Posture Observation:      General sitting posture is  normal.  General standing posture is fair.L shoulder is rounded >R, Shoulders sit even, R sided hypertrophy in thoracic spine, winging on L     Cervical Clearing: Normal    Shoulder/Elbow ROM : Within normal limits unless otherwise indicated    Date: 1/27/17     Shoulder and Elbow ROM ( )   AROM in degrees AROM in degrees AROM in degrees    Right Left Right Left Right Left   Shoulder Flexion (0-180 )  145       Shoulder Abduction (0-180 )  140       Shoulder Extension (0-60 )         Shoulder ER (0-90 )         Shoulder IR (0-70 )         Shoulder IR/Ext         Elbow Flexion (150 )         Elbow Extension (0 )          PROM in degrees PROM in degrees PROM in degrees    Right Left Right Left Right Left   Shoulder Flexion (0-180 )         Shoulder Abduction (0-180 )         Shoulder Extension (0-60 )         Shoulder ER (0-90 )         Shoulder IR (0-70 )         Elbow Flexion (150 )         Elbow Extension (0 )           Shoulder/Elbow Strength: Within normal limits unless otherwise indicated    Date:      Shoulder/Elbow Strength (/5)  Manual Muscle Test (MMT) MMT MMT MMT    Right Left Right Left Right Left   Shoulder Flexion  P       Supraspinatus         Shoulder Abduction  P       Shoulder Extension         Shoulder External Rotation  P       Shoulder Internal Rotation         Elbow Flexion         Elbow Extension         Other:         Other:             Palpation: Upper trap tension, pec tension, L winging, L rounded shoulder     Joint Assessment:  Sternoclavicular  Joint Assessment: WNL  Acromioclavicular Joint Assessment: WNL  Scapulothoracic Joint Assessment: winging L   Glenohumeral Joint Assessment:Posterior Capsule tightness.    Shoulder Special Tests     Impingement Cluster Right (+/-) Left (+/-) Rotator Cuff Tests Right (+/-) Left (+/-)   Sandra - - Drop Arm Sign - -   Painful Arc   Hornblowers     Infraspinatus Test   ERLS     AC Tests Right (+/-) Left (+/-) IRLS     Active Compression   Labral Tests Right (+/-) Left (+/-)   Crossbody Adduction   Biceps Load Test II     AC Resisted Extension   Jerk Test     GH Instability Tests Right (+/-) Left (+/-) Amaya Test - -   Sulcus Sign   Biceps Tests Right (+/-) Left (+/-)   Apprehension   Speed - -   Relocation   Yergasons     Other:   Other:     Other:   Other:         Treatment Today    TREATMENT MINUTES COMMENTS   Evaluation 20 Shoulder   Self-care/ Home management     Manual therapy     Neuromuscular Re-education     Therapeutic Activity     Therapeutic Exercises 45 Discussed pt's exercise program at length   HEP: See below   Each with demonstration of technique pt given orange theraband and print out of exercises this day.    Gait training     Modality__________________                Total 65    Blank areas are intentional and mean the treatment did not include these items.     HEP:             PT Evaluation Code: (Please list factors)  Patient History/Comorbidities: TIA Hx., Hypercholesterolemia  Examination: L shoulder, ROM, Strength  Clinical Presentation: Stable  Clinical Decision Making: Low    Patient History/  Comorbidities Examination  (body structures and functions, activity limitations, and/or participation restrictions) Clinical Presentation Clinical Decision Making (Complexity)   No documented Comorbidities or personal factors 1-2 Elements Stable and/or uncomplicated Low   1-2 documented comorbidities or personal factor 3 Elements Evolving clinical presentation with changing characteristics Moderate    3-4 documented comorbidities or personal factors 4 or more Unstable and unpredictable High         Piedad Ng  1/27/2017  11:33 AM

## 2021-06-26 NOTE — TELEPHONE ENCOUNTER
RN cannot approve Refill Request    RN can NOT refill this medication PCP messaged that patient is overdue for Office Visit. Last office visit: 11/19/2019 Yanick Salazar MD Last Physical: Visit date not found Last MTM visit: Visit date not found Last visit same specialty: 11/19/2019 Yanick Slaazar MD.  Next visit within 3 mo: Visit date not found  Next physical within 3 mo: Visit date not found      Trang Huntley, [facility] Triage/Med Refill 6/24/2021    Requested Prescriptions   Pending Prescriptions Disp Refills     atorvastatin (LIPITOR) 80 MG tablet [Pharmacy Med Name: ATORVASTATIN 80 MG TABLET] 90 tablet 0     Sig: TAKE 1 TABLET BY MOUTH EVERYDAY AT BEDTIME       Statins Refill Protocol (Hmg CoA Reductase Inhibitors) Failed - 6/24/2021  1:57 AM        Failed - PCP or prescribing provider visit in past 12 months      Last office visit with prescriber/PCP: 11/19/2019 Yanick Salazar MD OR same dept: Visit date not found OR same specialty: 11/19/2019 Yanick Salazar MD  Last physical: Visit date not found Last MTM visit: Visit date not found   Next visit within 3 mo: Visit date not found  Next physical within 3 mo: Visit date not found  Prescriber OR PCP: Yanick Salazar MD  Last diagnosis associated with med order: 1. Hypercholesterolemia  - atorvastatin (LIPITOR) 80 MG tablet [Pharmacy Med Name: ATORVASTATIN 80 MG TABLET]; TAKE 1 TABLET BY MOUTH EVERYDAY AT BEDTIME  Dispense: 90 tablet; Refill: 0    If protocol passes may refill for 12 months if within 3 months of last provider visit (or a total of 15 months).

## 2021-06-27 NOTE — PROGRESS NOTES
Progress Notes by Helen Contreras at 3/7/2019  1:15 PM     Author: Helen Contreras Service: -- Author Type: Physician    Filed: 3/11/2019  1:15 PM Encounter Date: 3/7/2019 Status: Signed    : Helen Contreras       ASSESSMENT and PLAN:  1. Essential hypertension  Suboptimally controlled.  She's open to adding amlodipine today.  We'll go with 5 mg since it's higher today than in Dec.  She'll schedule a six week f/u.  - amLODIPine (NORVASC) 5 MG tablet; Take 1 tablet (5 mg total) by mouth daily.  Dispense: 30 tablet; Refill: 1        There are no discontinued medications.    Return in about 6 months (around 9/7/2019) for Recheck.    Patient Instructions   Let's have you add 5 mg daily of amlodipine.  See Otm in about 2 weeks for a BP follow up.      Patient Education     Eating Heart-Healthy Foods  Eating has a big impact on your heart health. In fact, eating healthier can improve several of your heart risks at once. For instance, it helps you manage weight, cholesterol, and blood pressure. Here are ideas to help you make heart-healthy changes without giving up all the foods and flavors you love.  Getting started    Talk with your healthcare provider about eating plans, such as the DASH or Mediterranean diet. You may also be referred to a dietitian.    Change a few things at a time. Give yourself time to get used to a few eating changes before adding more.    Work to create a tasty, healthy eating plan that you can stick to for the rest of your life.    Goals for healthy eating  Below are some tips to improve your eating habits:    Limit saturated fats and trans fats. Saturated fats raise your levels of cholesterol, so keep these fats to a minimum. They are found in foods such as fatty meats, whole milk, cheese, and palm and coconut oils. Avoid trans fats because they lower good cholesterol as well as raise bad cholesterol. Trans fats are most often found in processed foods.    Reduce sodium (salt) intake.  Eating too much salt may increase your blood pressure. Limit your sodium intake to 2,300 milligrams (mg) per day (the amount in 1 teaspoon of salt), or less if your healthcare provider recommends it. Dining out less often and eating fewer processed foods are two great ways to decrease the amount of salt you consume.    Managing calories. A calorie is a unit of energy. Your body burns calories for fuel, but if you eat more calories than your body burns, the extras are stored as fat. Your healthcare provider can help you create a diet plan to manage your calories. This will likely include eating healthier foods as well as exercising regularly. To help you track your progress, keep a diary to record what you eat and how often you exercise.  Choose the right foods  Aim to make these foods staples of your diet. If you have diabetes, you may have different recommendations than what is listed here:    Fruits and vegetables provide plenty of nutrients without a lot of calories. At meals, fill half your plate with these foods. Split the other half of your plate between whole grains and lean protein.    Whole grains are high in fiber and rich in vitamins and nutrients. Good choices include whole-wheat bread, pasta, and brown rice.    Lean proteins give you nutrition with less fat. Good choices include fish, skinless chicken, and beans.    Low-fat or nonfat dairy provides nutrients without a lot of fat. Try low-fat or nonfat milk, cheese, or yogurt.    Healthy fats can be good for you in small amounts. These are unsaturated fats, such as olive oil, nuts, and fish. Try to have at least 2 servings per week of fatty fish, such as salmon, sardines, mackerel, rainbow trout, and albacore tuna. These contain omega-3 fatty acids, which are good for your heart. Flaxseed is another source of a heart-healthy fat.  More on heart-healthy eating  Read food labels  Healthy eating starts at the grocery store. Be sure to pay attention to food  labels on packaged foods. Look for products that are high in fiber and protein, and low in saturated fat, cholesterol, and sodium. Avoid products that contain trans fat. And pay close attention to serving size. For instance, if you plan to eat two servings, double all the numbers on the label.  Prepare food right  A key part of healthy cooking is cutting down on added fat and salt. Look on the internet for lower-fat, lower-sodium recipes. Also, try these tips:    Remove fat from meat and skin from poultry before cooking.    Skim fat from the surface of soups and sauces.    Broil, boil, bake, steam, grill, and microwave food without added fats.    Choose ingredients that spice up your food without adding calories, fat, or sodium. Try these items: horseradish, hot sauce, lemon, mustard, nonfat salad dressings, and vinegar. For salt-free herbs and spices, try basil, cilantro, cinnamon, pepper, and rosemary.  Date Last Reviewed: 10/1/2017    3006-9900 [employer]. [address] [place] [address]. All rights reserved. This information is not intended as a substitute for professional medical care. Always follow your healthcare professional's instructions.               CHIEF COMPLAINT:  Chief Complaint   Patient presents with   ? Hypertension       HISTORY OF PRESENT ILLNESS:  Karen Li is a 68 y.o. female  presenting to the clinic today for follow up of HTN.  She was last in in Dec.  At that time, her BP was only mildly elevated.  Rather than add meds at that time, she opted to work on increasing physical activity and working on diet.  Her goal was to lose 10 pounds.  Today, her BP is higher.  She was out of her exercise routine.  She was having some back spasms.  She has room to improve her diet.  She gets anxious driving in the winter.      She had a bout of depression over the winter.  She wasn't feeling great about herself.  Because of that she opted not to try to breed her dog again.   She'll consider it again this summer.      REVIEW OF SYSTEMS:    All other systems are negative.    PFSH:  Family History   Problem Relation Age of Onset   ? Coronary artery disease Father 70   ? Heart disease Father    ? Dementia Mother      TOBACCO USE:  Social History     Tobacco Use   Smoking Status Never Smoker   Smokeless Tobacco Never Used       VITALS:  Vitals:    03/07/19 1321 03/07/19 1411   BP: (!) 181/88 (!) 167/100   Patient Site: Right Arm    Patient Position: Sitting    Cuff Size: Adult Large    Pulse: 76    SpO2: 99%    Weight: 177 lb 6.4 oz (80.5 kg)      Wt Readings from Last 3 Encounters:   03/07/19 177 lb 6.4 oz (80.5 kg)   12/12/18 176 lb 4.8 oz (80 kg)   11/13/18 162 lb (73.5 kg)         PHYSICAL EXAM:  Constitutional:  Reveals an alert, pleasant adult  female.   Vitals:  Noted.     QUALITY MEASURES:  The following high BMI interventions were performed this visit: encouragement to exercise and weight monitoring    MEDICATIONS:  Current Outpatient Medications   Medication Sig Dispense Refill   ? aspirin 81 MG EC tablet Take 1 tablet (81 mg total) by mouth daily.  0   ? atorvastatin (LIPITOR) 80 MG tablet Take 1 tablet (80 mg total) by mouth at bedtime. 90 tablet 3   ? calcium carbonate-vitamin D3 600 mg(1,500mg) -400 unit Chew Chew 1 tablet 2 (two) times a day.     ? cholecalciferol, vitamin D3, 1,000 unit tablet Take 1,000 Units by mouth daily.     ? CYANOCOBALAMIN, VITAMIN B-12, (VITAMIN B-12 ORAL) Take 1 tablet by mouth daily.     ? lisinopril (PRINIVIL,ZESTRIL) 40 MG tablet Take 1 tablet (40 mg total) by mouth daily. 90 tablet 2   ? MULTIVITAMIN ORAL Take 1 tablet by mouth daily.     ? amLODIPine (NORVASC) 5 MG tablet Take 1 tablet (5 mg total) by mouth daily. 30 tablet 1     No current facility-administered medications for this visit.

## 2021-07-03 NOTE — ADDENDUM NOTE
Addendum Note by Moraima Maddox CNP at 10/16/2018  3:29 PM     Author: Moraima Maddox CNP Service: -- Author Type: Nurse Practitioner    Filed: 10/16/2018  3:29 PM Encounter Date: 10/16/2018 Status: Signed    : Moraima Maddox CNP (Nurse Practitioner)    Addended by: MORAIMA MADDOX on: 10/16/2018 03:29 PM        Modules accepted: Orders

## 2021-07-22 ENCOUNTER — OFFICE VISIT (OUTPATIENT)
Dept: INTERNAL MEDICINE | Facility: CLINIC | Age: 71
End: 2021-07-22
Payer: MEDICARE

## 2021-07-22 VITALS
BODY MASS INDEX: 29.33 KG/M2 | SYSTOLIC BLOOD PRESSURE: 160 MMHG | WEIGHT: 179 LBS | HEART RATE: 84 BPM | DIASTOLIC BLOOD PRESSURE: 90 MMHG

## 2021-07-22 DIAGNOSIS — D75.89 MACROCYTOSIS WITHOUT ANEMIA: ICD-10-CM

## 2021-07-22 DIAGNOSIS — Z51.81 ENCOUNTER FOR THERAPEUTIC DRUG MONITORING: ICD-10-CM

## 2021-07-22 DIAGNOSIS — I10 ESSENTIAL HYPERTENSION: Primary | ICD-10-CM

## 2021-07-22 DIAGNOSIS — Z82.49 FAMILY HISTORY OF CORONARY ARTERY DISEASE IN FATHER: ICD-10-CM

## 2021-07-22 DIAGNOSIS — E78.00 HYPERCHOLESTEROLEMIA: ICD-10-CM

## 2021-07-22 DIAGNOSIS — I25.10 CORONARY ARTERY CALCIFICATION SEEN ON CT SCAN: ICD-10-CM

## 2021-07-22 DIAGNOSIS — Z85.828 HISTORY OF SQUAMOUS CELL CARCINOMA OF SKIN: ICD-10-CM

## 2021-07-22 LAB
ALBUMIN SERPL-MCNC: 3.9 G/DL (ref 3.5–5)
ALP SERPL-CCNC: 72 U/L (ref 45–120)
ALT SERPL W P-5'-P-CCNC: 21 U/L (ref 0–45)
ANION GAP SERPL CALCULATED.3IONS-SCNC: 15 MMOL/L (ref 5–18)
AST SERPL W P-5'-P-CCNC: 33 U/L (ref 0–40)
BILIRUB SERPL-MCNC: 0.5 MG/DL (ref 0–1)
BUN SERPL-MCNC: 18 MG/DL (ref 8–28)
CALCIUM SERPL-MCNC: 10 MG/DL (ref 8.5–10.5)
CHLORIDE BLD-SCNC: 106 MMOL/L (ref 98–107)
CHOLEST SERPL-MCNC: 177 MG/DL
CO2 SERPL-SCNC: 24 MMOL/L (ref 22–31)
CREAT SERPL-MCNC: 0.76 MG/DL (ref 0.6–1.1)
ERYTHROCYTE [DISTWIDTH] IN BLOOD BY AUTOMATED COUNT: 13 % (ref 10–15)
FASTING STATUS PATIENT QL REPORTED: YES
GFR SERPL CREATININE-BSD FRML MDRD: 78 ML/MIN/1.73M2
GLUCOSE BLD-MCNC: 95 MG/DL (ref 70–125)
HCT VFR BLD AUTO: 38.5 % (ref 35–47)
HDLC SERPL-MCNC: 59 MG/DL
HGB BLD-MCNC: 12.6 G/DL (ref 11.7–15.7)
LDLC SERPL CALC-MCNC: 100 MG/DL
MCH RBC QN AUTO: 34.1 PG (ref 26.5–33)
MCHC RBC AUTO-ENTMCNC: 32.7 G/DL (ref 31.5–36.5)
MCV RBC AUTO: 104 FL (ref 78–100)
PLATELET # BLD AUTO: 231 10E3/UL (ref 150–450)
POTASSIUM BLD-SCNC: 4.1 MMOL/L (ref 3.5–5)
PROT SERPL-MCNC: 7.3 G/DL (ref 6–8)
RBC # BLD AUTO: 3.7 10E6/UL (ref 3.8–5.2)
SODIUM SERPL-SCNC: 145 MMOL/L (ref 136–145)
TRIGL SERPL-MCNC: 90 MG/DL
VIT B12 SERPL-MCNC: 651 PG/ML (ref 213–816)
WBC # BLD AUTO: 5.8 10E3/UL (ref 4–11)

## 2021-07-22 PROCEDURE — 80053 COMPREHEN METABOLIC PANEL: CPT | Performed by: INTERNAL MEDICINE

## 2021-07-22 PROCEDURE — 80061 LIPID PANEL: CPT | Performed by: INTERNAL MEDICINE

## 2021-07-22 PROCEDURE — 36415 COLL VENOUS BLD VENIPUNCTURE: CPT | Performed by: INTERNAL MEDICINE

## 2021-07-22 PROCEDURE — 85027 COMPLETE CBC AUTOMATED: CPT | Performed by: INTERNAL MEDICINE

## 2021-07-22 PROCEDURE — 82607 VITAMIN B-12: CPT | Performed by: INTERNAL MEDICINE

## 2021-07-22 PROCEDURE — 99214 OFFICE O/P EST MOD 30 MIN: CPT | Performed by: INTERNAL MEDICINE

## 2021-07-22 NOTE — PATIENT INSTRUCTIONS
Start a regular walking and/or exercise routine.  Goal for 30 minutes of walking aerobic type exercise every day.    Work on weight loss with reduce simple carbohydrates and starchy foods/baked goods in diet.  Reduce alcohol intake.    Continue to monitor blood pressure.  Goal blood pressure less than 135/85.    Follow-up in approximately 3 months for blood pressure follow-up appointment, bring your blood pressure cuff.    Schedule your routine mammogram.    See dermatology for skin exam, because of your history of skin cancer.    Your last tetanus shot was in 2017, you are up-to-date on tetanus.

## 2021-07-22 NOTE — LETTER
July 23, 2021      Karen Li  [address]  [place] [address]        Dear ,    We are writing to inform you of your test results.    Kidney and liver tests are normal.  Blood sugar is normal.    Excellent cholesterol levels.    B12 level is normal.  Hemoglobin level is normal, MCV stable.    Labs look good.  No change in treatment plan.    Resulted Orders   Comprehensive metabolic panel   Result Value Ref Range    Sodium 145 136 - 145 mmol/L    Potassium 4.1 3.5 - 5.0 mmol/L    Chloride 106 98 - 107 mmol/L    Carbon Dioxide (CO2) 24 22 - 31 mmol/L    Anion Gap 15 5 - 18 mmol/L    Urea Nitrogen 18 8 - 28 mg/dL    Creatinine 0.76 0.60 - 1.10 mg/dL    Calcium 10.0 8.5 - 10.5 mg/dL    Glucose 95 70 - 125 mg/dL    Alkaline Phosphatase 72 45 - 120 U/L    AST 33 0 - 40 U/L    ALT 21 0 - 45 U/L    Protein Total 7.3 6.0 - 8.0 g/dL    Albumin 3.9 3.5 - 5.0 g/dL    Bilirubin Total 0.5 0.0 - 1.0 mg/dL    GFR Estimate 78 >60 mL/min/1.73m2      Comment:      As of July 11, 2021, eGFR is calculated by the CKD-EPI creatinine equation, without race adjustment. eGFR can be influenced by muscle mass, exercise, and diet. The reported eGFR is an estimation only and is only applicable if the renal function is stable.   Lipid Profile   Result Value Ref Range    Cholesterol 177 <=199 mg/dL    Triglycerides 90 <=149 mg/dL    Direct Measure HDL 59 >=50 mg/dL      Comment:      HDL Cholesterol Reference Range:     0-2 years:   No reference ranges established for patients under 2 years old  at [facility] for lipid analytes.    2-8 years:  Greater than 45 mg/dL     18 years and older:   Female: Greater than or equal to 50 mg/dL   Male:   Greater than or equal to 40 mg/dL    LDL Cholesterol Calculated 100 <=129 mg/dL    Patient Fasting > 8hrs? Yes    CBC with platelets   Result Value Ref Range    WBC Count 5.8 4.0 - 11.0 10e3/uL    RBC Count 3.70 (L) 3.80 - 5.20 10e6/uL    Hemoglobin 12.6 11.7 - 15.7 g/dL    Hematocrit 38.5  35.0 - 47.0 %     (H) 78 - 100 fL    MCH 34.1 (H) 26.5 - 33.0 pg    MCHC 32.7 31.5 - 36.5 g/dL    RDW 13.0 10.0 - 15.0 %    Platelet Count 231 150 - 450 10e3/uL   Vitamin B12   Result Value Ref Range    Vitamin B12 651 213 - 816 pg/mL       If you have any questions or concerns, please call the clinic at the number listed above.       Sincerely,      Yanick Salazar MD

## 2021-07-22 NOTE — PROGRESS NOTES
[facility] clinic Follow Up Note    Karen Li   71 year old female    Date of Visit: 2021    Chief Complaint   Patient presents with     Follow Up     Blood pressure checkup     Subjective  Karen is a 71-year-old female here for follow-up on hypertension with significant whitecoat hypertension.    She has PTSD since her   at [facility] 12 years ago.  She does drink chronic alcohol 2 to 3 glasses of wine every evening.  She denies any falls.    Her blood pressure in January of this year was 162/84.    She does go to the dentist with a blood pressure of 130s over 80s there on 3 occasions in the last few months.    She does have a home cuff but she does not seem to get accurate numbers with that at home.  She does admit she sometimes does get high numbers at home.  She did bring that cuff into her appointment in January and it did appear to correlate well.    Patient had gone to [employer], but is not getting regular exercise now.  She does have dogs and does walk intermittently.  She does not want to do a home aerobics routine as it makes her dogs bark.    No new exertional symptoms.  No increasing shortness of breath or chest pain or palpitations.    She is compliant with losartan 100 mg a day and amlodipine 5 mg a day.  Occasional mild puffiness of her ankles but no significant edema.    No orthostatic type dizziness.    I did review labs from 2021 with a normal creatinine.    She has not been on a diuretic previously.    She is on atorvastatin 80 mg a day and low-dose aspirin.  No upper abdominal pain complaints.  No history of ulcer.  No blood in stool or melena.    2019 cardiac CT scan showed a calcium score of 255.   carotid ultrasound with mild plaque but no stenosis.    Her father did die young with a sudden heart attack.    I did review labs from 2021 with an LDL of 100 and HDL 77.    In January her MCV was 104 but normal hemoglobin.  She  does drink chronic alcohol.  This is a chronic condition and in 2017 - blood smear and normal B12 level.  Normal liver tests in 2019.    Past history of squamous cell cancer, no new skin lesions noted.  She is overdue for skin exam.    June 2019 mammogram negative.  No family history of breast cancer.  Status post hysterectomy with BSO in 1992.  She still sees gynecology.    2017 DEXA scan with a femur score -0.6/-0.1, spine score -0.7 no fracture history.    Colonoscopy July 2013 - with 10-year follow-up.    No headache complaints.    She denies significant daytime sleepiness.  She feels she sleeps well at night and denies sleep apnea.    PMHx:  No past medical history on file.  PSHx:    Past Surgical History:   Procedure Laterality Date     ANKLE SURGERY Left 2002     CATARACT EXTRACTION       HYSTERECTOMY       HYSTERECTOMY TOTAL ABDOMINAL, BILATERAL SALPINGO-OOPHORECTOMY, COMBINED Bilateral 1992     Immunizations:   Immunization History   Administered Date(s) Administered     COVID-19,PF,[place] 04/09/2021     FLU 6-35 months 03/15/2011, 09/05/2012     Flu, Unspecified 10/25/2007     Influenza (High Dose) 3 valent vaccine 09/24/2015, 01/24/2017, 02/01/2018, 09/11/2018, 11/19/2019     Influenza Vaccine, 6+MO IM (QUADRIVALENT W/PRESERVATIVES) 01/27/2009, 12/08/2009     Influenza, Quad, High Dose, Pf, 65yr + 01/05/2021     Pneumo Conj 13-V (2010&after) 09/24/2015     Pneumococcal 23 valent 01/24/2017     Rabies, Unspecified 07/12/2013, 07/15/2013, 07/19/2013, 07/26/2013     Td (Adult), Adsorbed 08/05/1994     Tdap (Adacel,Boostrix) 07/02/2007, 01/24/2017       ROS A comprehensive review of systems was performed and was otherwise negative    Medications, allergies, and problem list were reviewed and updated    Exam  BP (!) 160/90   Pulse 84   Wt 81.2 kg (179 lb)   Breastfeeding No   BMI 29.33 kg/m    Blood pressure was rechecked by me and confirmed above.  Patient's alert and oriented x3.  Appears mildly  anxious.  Pupils and irises equal and reactive.  No jaundice.  No JVD.  No carotid bruits.  Lungs are clear to auscultation with slight reduced respiratory excursion with small chest cavity and moderate obesity.  Heart is regular without murmur rub or gallop.  No significant lower extremity edema.  Abdomen is moderately overweight but nontender.  No hepatomegaly or pulsatile abdominal mass.    Assessment/Plan  1. Essential hypertension  Whitecoat hypertension.  I suspect her blood pressure is moderately elevated at home as well.  History of chronic alcohol may be affecting blood pressures next day.    She did not want to add additional medications at this time.  I did discuss starting hydrochlorothiazide, but also with risk of dehydration and low sodium.    Also discussed change of losartan to irbesartan or more potent ARB.    Also could consider increase amlodipine, but I did discuss potential risk of lower extremity edema.    She prefers to start on a regular exercise routine and work on weight loss and reevaluate in 3 to 4 months this fall.  I did encourage her to bring her blood pressure cuff with her next visit.    2. Hypercholesterolemia  Moderate cardiovascular risk with positive cardiac CT scan and family history.  Goal LDL less than 100.    She does appear to be tolerating the higher dose atorvastatin.  I did discuss liver and muscle toxicity risk with that.    She is on low-dose aspirin.  I did discuss bleeding risk with that.  - Lipid Profile    3. Coronary artery calcification seen on CT scan  Asymptomatic.  Continue medical management    4. Family history of coronary artery disease in father  As above    5. History of squamous cell carcinoma of skin  No evidence of recurrence, see dermatology this year for routine follow-up    6. Macrocytosis without anemia  Consistent with her chronic alcohol.  Patient was counseled to reduce alcohol.  - CBC with platelets  - Vitamin B12    7. Encounter for therapeutic  drug monitoring    - Comprehensive metabolic panel    10-year colonoscopy due July 2023    Patient was reminded to schedule her routine mammogram.    Status post hysterectomy with BSO    She had a superficial dog bite on her left wrist, healing well, there is a number of weeks ago.  I did confirm that her tetanus shot was given last in 2017 no evidence of active infection currently.      Return in about 3 months (around 10/22/2021) for Follow up.   Patient Instructions   Start a regular walking and/or exercise routine.  Goal for 30 minutes of walking aerobic type exercise every day.    Work on weight loss with reduce simple carbohydrates and starchy foods/baked goods in diet.  Reduce alcohol intake.    Continue to monitor blood pressure.  Goal blood pressure less than 135/85.    Follow-up in approximately 3 months for blood pressure follow-up appointment, bring your blood pressure cuff.    Schedule your routine mammogram.    See dermatology for skin exam, because of your history of skin cancer.    Your last tetanus shot was in 2017, you are up-to-date on tetanus.    Yanick Salazar MD, MD        Current Outpatient Medications   Medication Sig Dispense Refill     amLODIPine (NORVASC) 5 MG tablet [AMLODIPINE (NORVASC) 5 MG TABLET] TAKE 1 TABLET BY MOUTH EVERY DAY 90 tablet 3     aspirin 81 MG EC tablet [ASPIRIN 81 MG EC TABLET] Take 1 tablet (81 mg total) by mouth daily.  0     atorvastatin (LIPITOR) 80 MG tablet [ATORVASTATIN (LIPITOR) 80 MG TABLET] TAKE 1 TABLET BY MOUTH EVERYDAY AT BEDTIME 90 tablet 0     calcium carbonate-vitamin D3 600 mg(1,500mg) -400 unit Chew [CALCIUM CARBONATE-VITAMIN D3 600 MG(1,500MG) -400 UNIT CHEW] Chew 1 tablet daily.              losartan (COZAAR) 100 MG tablet [LOSARTAN (COZAAR) 100 MG TABLET] TAKE 1 TABLET BY MOUTH EVERY DAY 90 tablet 0     MULTIVITAMIN ORAL [MULTIVITAMIN ORAL] Take 1 tablet by mouth daily.       No Known Allergies  Social History     Tobacco Use     Smoking status:  Never Smoker     Smokeless tobacco: Never Used   Substance Use Topics     Alcohol use: Yes     Comment: Alcoholic Drinks/day: wine 2-3 glasses of wine a day     Drug use: No

## 2021-09-07 DIAGNOSIS — E78.00 PURE HYPERCHOLESTEROLEMIA: ICD-10-CM

## 2021-09-07 DIAGNOSIS — I10 ESSENTIAL HYPERTENSION: ICD-10-CM

## 2021-09-07 RX ORDER — ATORVASTATIN CALCIUM 80 MG/1
TABLET, FILM COATED ORAL
Qty: 90 TABLET | Refills: 3 | Status: SHIPPED | OUTPATIENT
Start: 2021-09-07 | End: 2022-09-06

## 2021-09-07 RX ORDER — LOSARTAN POTASSIUM 100 MG/1
TABLET ORAL
Qty: 90 TABLET | Refills: 3 | Status: SHIPPED | OUTPATIENT
Start: 2021-09-07 | End: 2022-09-02

## 2021-09-07 NOTE — TELEPHONE ENCOUNTER
Refill Request  Medication name: Pending Prescriptions:                       Disp   Refills    atorvastatin (LIPITOR) 80 MG tablet       90 tab*0          Who prescribed the medication: Martin  Last refill on medication: 06/24/21  Requested Pharmacy: [facility]  Last appointment with PCP: 07/22/21  Next appointment: Appointment scheduled for 11/02/21

## 2021-09-07 NOTE — TELEPHONE ENCOUNTER
Refill Request  Medication name: Pending Prescriptions:                       Disp   Refills    losartan (COZAAR) 100 MG tablet           90 tab*0          Who prescribed the medication: jennifer  Last refill on medication: 05/29/21  Requested Pharmacy: [facility]  Last appointment with PCP: 07/22/21  Next appointment: Appointment scheduled for 11/02/21

## 2021-11-02 ENCOUNTER — OFFICE VISIT (OUTPATIENT)
Dept: INTERNAL MEDICINE | Facility: CLINIC | Age: 71
End: 2021-11-02
Payer: MEDICARE

## 2021-11-02 VITALS
BODY MASS INDEX: 28.61 KG/M2 | WEIGHT: 178 LBS | DIASTOLIC BLOOD PRESSURE: 80 MMHG | HEART RATE: 77 BPM | HEIGHT: 66 IN | SYSTOLIC BLOOD PRESSURE: 148 MMHG | OXYGEN SATURATION: 100 %

## 2021-11-02 DIAGNOSIS — E78.00 HYPERCHOLESTEROLEMIA: ICD-10-CM

## 2021-11-02 DIAGNOSIS — Z85.828 HISTORY OF SQUAMOUS CELL CARCINOMA OF SKIN: ICD-10-CM

## 2021-11-02 DIAGNOSIS — Z51.81 ENCOUNTER FOR THERAPEUTIC DRUG MONITORING: ICD-10-CM

## 2021-11-02 DIAGNOSIS — I10 ESSENTIAL HYPERTENSION: Primary | ICD-10-CM

## 2021-11-02 DIAGNOSIS — Z23 HIGH PRIORITY FOR 2019-NCOV VACCINE: ICD-10-CM

## 2021-11-02 LAB
ALBUMIN SERPL-MCNC: 4.3 G/DL (ref 3.5–5)
ALP SERPL-CCNC: 68 U/L (ref 45–120)
ALT SERPL W P-5'-P-CCNC: 35 U/L (ref 0–45)
ANION GAP SERPL CALCULATED.3IONS-SCNC: 9 MMOL/L (ref 5–18)
AST SERPL W P-5'-P-CCNC: 36 U/L (ref 0–40)
BILIRUB SERPL-MCNC: 0.5 MG/DL (ref 0–1)
BUN SERPL-MCNC: 14 MG/DL (ref 8–28)
CALCIUM SERPL-MCNC: 9.9 MG/DL (ref 8.5–10.5)
CHLORIDE BLD-SCNC: 104 MMOL/L (ref 98–107)
CO2 SERPL-SCNC: 26 MMOL/L (ref 22–31)
CREAT SERPL-MCNC: 0.84 MG/DL (ref 0.6–1.1)
ERYTHROCYTE [DISTWIDTH] IN BLOOD BY AUTOMATED COUNT: 12.9 % (ref 10–15)
GFR SERPL CREATININE-BSD FRML MDRD: 69 ML/MIN/1.73M2
GLUCOSE BLD-MCNC: 112 MG/DL (ref 70–125)
HCT VFR BLD AUTO: 40.8 % (ref 35–47)
HGB BLD-MCNC: 13.6 G/DL (ref 11.7–15.7)
MCH RBC QN AUTO: 34.5 PG (ref 26.5–33)
MCHC RBC AUTO-ENTMCNC: 33.3 G/DL (ref 31.5–36.5)
MCV RBC AUTO: 104 FL (ref 78–100)
PLATELET # BLD AUTO: 219 10E3/UL (ref 150–450)
POTASSIUM BLD-SCNC: 4.2 MMOL/L (ref 3.5–5)
PROT SERPL-MCNC: 7.7 G/DL (ref 6–8)
RBC # BLD AUTO: 3.94 10E6/UL (ref 3.8–5.2)
SODIUM SERPL-SCNC: 139 MMOL/L (ref 136–145)
WBC # BLD AUTO: 5.8 10E3/UL (ref 4–11)

## 2021-11-02 PROCEDURE — 80053 COMPREHEN METABOLIC PANEL: CPT | Performed by: INTERNAL MEDICINE

## 2021-11-02 PROCEDURE — 99214 OFFICE O/P EST MOD 30 MIN: CPT | Mod: 25 | Performed by: INTERNAL MEDICINE

## 2021-11-02 PROCEDURE — 85027 COMPLETE CBC AUTOMATED: CPT | Performed by: INTERNAL MEDICINE

## 2021-11-02 PROCEDURE — 91300 COVID-19,PF,PFIZER (12+ YRS): CPT | Performed by: INTERNAL MEDICINE

## 2021-11-02 PROCEDURE — 0004A COVID-19,PF,PFIZER (12+ YRS): CPT | Performed by: INTERNAL MEDICINE

## 2021-11-02 PROCEDURE — 36415 COLL VENOUS BLD VENIPUNCTURE: CPT | Performed by: INTERNAL MEDICINE

## 2021-11-02 RX ORDER — TERBINAFINE HYDROCHLORIDE 250 MG/1
TABLET ORAL
COMMUNITY
Start: 2021-10-28 | End: 2022-10-12

## 2021-11-02 ASSESSMENT — MIFFLIN-ST. JEOR: SCORE: 1321.21

## 2021-11-02 NOTE — PROGRESS NOTES
[facility] clinic Follow Up Note    Karen Li   71 year old female    Date of Visit: 2021    Chief Complaint   Patient presents with     RECHECK     3 month follow up     Imm/Inj     COVID-19 VACCINE     Subjective  Karen is a 71-year-old female here for follow-up on blood pressure and other medical issues.    She is starting to walk every day now for 30 to 40 minutes.  She feels good with walking, she has a park nearby and plans to walk through the winter.  No chest pain or increasing shortness of breath.    She has a blood pressure cuff at home but she had some trouble operating it, we did discuss that today and she should be able to get it to work.  She seen the dentist twice this month and blood pressure 134/80 both times.    No orthostasis or edema.    Continues on losartan 100 mg a day and amlodipine 5 mg a day.  Blood pressures been controlled in the past.    She has PTSD coming to [place], her   here 12 years ago.    She still drinks in the evening, had been drinking 2 to 3 glasses of wine previously.  In July elevated MCV but normal B12 level.  Normal liver test.  Normal creatinine and blood sugar.    She is never smoked.  No new cough.    2019 cardiac CT scan calcium score 255.   carotid artery ultrasound showed mild plaque but no stenosis.    Strong family history with her father dying of a heart attack.    She is on Lipitor 80 mg and aspirin 81 mg a day.  No abdominal pain complaints.  No blood in stool or history of bleeding.    2021 labs reviewed with an LDL of 100 and HDL 59.    Past history of squamous cell cancer, no recurrence on full body skin exam earlier this summer.  She was put on Lamisil for toenail fungus.  She is following liver tests through the dermatologist and they have been normal so far.    No family history of breast cancer, she reports a mammogram from September was negative.  She sees her gynecologist later this week but has had a  "hysterectomy and BSO in 1992.    DEXA scan essentially normal in 2017.    July 2013 colonoscopy negative with 10-year follow-up plan.    PMHx:  No past medical history on file.  PSHx:    Past Surgical History:   Procedure Laterality Date     ANKLE SURGERY Left 2002     CATARACT EXTRACTION       HYSTERECTOMY       HYSTERECTOMY TOTAL ABDOMINAL, BILATERAL SALPINGO-OOPHORECTOMY, COMBINED Bilateral 1992     Immunizations:   Immunization History   Administered Date(s) Administered     COVID-19,PF,[place] 04/09/2021     FLU 6-35 months 03/15/2011, 09/05/2012     Flu, Unspecified 10/25/2007     Influenza (High Dose) 3 valent vaccine 09/24/2015, 01/24/2017, 02/01/2018, 09/11/2018, 11/19/2019     Influenza Vaccine, 6+MO IM (QUADRIVALENT W/PRESERVATIVES) 01/27/2009, 12/08/2009     Influenza, Quad, High Dose, Pf, 65yr+ (Fluzone HD) 01/05/2021     Pneumo Conj 13-V (2010&after) 09/24/2015     Pneumococcal 23 valent 01/24/2017     Rabies, Unspecified 07/12/2013, 07/15/2013, 07/19/2013, 07/26/2013     Td (Adult), Adsorbed 08/05/1994     Tdap (Adacel,Boostrix) 07/02/2007, 01/24/2017       ROS A comprehensive review of systems was performed and was otherwise negative    Medications, allergies, and problem list were reviewed and updated    Exam  BP (!) 148/80   Pulse 77   Ht 1.664 m (5' 5.5\")   Wt 80.7 kg (178 lb)   SpO2 100%   BMI 29.17 kg/m    Appears well.  Bright and alert.  No jaundice.  No JVD.  Lungs are clear.  Heart is regular without murmur.  Abdomen nontender, mildly overweight.  No edema    Assessment/Plan  1. Essential hypertension  Borderline controlled, likely whitecoat hypertension.  Blood pressure normal at the dentist.  Continue on current losartan and amlodipine.  She will check blood pressure on her home cuff, does sound like she just needs to wait for her to finish before it gives her blood pressure.    Continue regular ambulation.  Low-salt diet.    2. Hypercholesterolemia  Strong family history of heart " disease and positive cardiac CT scan.  No event.  Continue current Lipitor and low-dose aspirin.  Fasting labs in the spring at your physical    3. History of squamous cell carcinoma of skin  Negative skin exam this past summer.    4. Encounter for therapeutic drug monitoring    - CBC with platelets  - Comprehensive metabolic panel    5. High priority for 2019-nCoV vaccine  Ben [employer] Ben vaccine in April.  I recommended the Pfizer for booster.  - COVID-19,PF,PFIZER (12+ Yrs)    She will get a flu shot later this fall on her own.    History of toenail fungus, on Lamisil.  I did review the liver toxicity risk with patient.  Check liver test today and she will continue to follow liver test through the dermatologist      Return in about 6 months (around 5/2/2022) for Routine preventive.   Patient Instructions   Continue with your daily walking and activity.    No change in medication treatment plan.    Goal blood pressure less than 135/85, but not less than 110/60.  Contact me if you feel your blood pressure is running too high.    Follow-up with me for adult wellness visit physical exam in 6 months, if you are otherwise doing well.        Yanick Salazar MD, MD        Current Outpatient Medications   Medication Sig Dispense Refill     amLODIPine (NORVASC) 5 MG tablet [AMLODIPINE (NORVASC) 5 MG TABLET] TAKE 1 TABLET BY MOUTH EVERY DAY 90 tablet 3     aspirin 81 MG EC tablet [ASPIRIN 81 MG EC TABLET] Take 1 tablet (81 mg total) by mouth daily.  0     atorvastatin (LIPITOR) 80 MG tablet [ATORVASTATIN (LIPITOR) 80 MG TABLET] TAKE 1 TABLET BY MOUTH EVERYDAY AT BEDTIME 90 tablet 3     calcium carbonate-vitamin D3 600 mg(1,500mg) -400 unit Chew [CALCIUM CARBONATE-VITAMIN D3 600 MG(1,500MG) -400 UNIT CHEW] Chew 1 tablet daily.              losartan (COZAAR) 100 MG tablet [LOSARTAN (COZAAR) 100 MG TABLET] TAKE 1 TABLET BY MOUTH EVERY DAY 90 tablet 3     MULTIVITAMIN ORAL [MULTIVITAMIN ORAL] Take 1 tablet by mouth daily.        terbinafine (LAMISIL) 250 MG tablet        No Known Allergies  Social History     Tobacco Use     Smoking status: Never Smoker     Smokeless tobacco: Never Used   Substance Use Topics     Alcohol use: Yes     Comment: Alcoholic Drinks/day: wine 2-3 glasses of wine a day     Drug use: No

## 2021-11-02 NOTE — PATIENT INSTRUCTIONS
Continue with your daily walking and activity.    No change in medication treatment plan.    Goal blood pressure less than 135/85, but not less than 110/60.  Contact me if you feel your blood pressure is running too high.    Follow-up with me for adult wellness visit physical exam in 6 months, if you are otherwise doing well.

## 2021-11-02 NOTE — LETTER
November 2, 2021      Karen Li  [address]  [place] [address]        Dear ,    We are writing to inform you of your test results.    Kidney and liver tests are normal.  Blood sugar was normal.  Hemoglobin level normal.    No change in treatment plan.    Resulted Orders   CBC with platelets   Result Value Ref Range    WBC Count 5.8 4.0 - 11.0 10e3/uL    RBC Count 3.94 3.80 - 5.20 10e6/uL    Hemoglobin 13.6 11.7 - 15.7 g/dL    Hematocrit 40.8 35.0 - 47.0 %     (H) 78 - 100 fL    MCH 34.5 (H) 26.5 - 33.0 pg    MCHC 33.3 31.5 - 36.5 g/dL    RDW 12.9 10.0 - 15.0 %    Platelet Count 219 150 - 450 10e3/uL   Comprehensive metabolic panel   Result Value Ref Range    Sodium 139 136 - 145 mmol/L    Potassium 4.2 3.5 - 5.0 mmol/L    Chloride 104 98 - 107 mmol/L    Carbon Dioxide (CO2) 26 22 - 31 mmol/L    Anion Gap 9 5 - 18 mmol/L    Urea Nitrogen 14 8 - 28 mg/dL    Creatinine 0.84 0.60 - 1.10 mg/dL    Calcium 9.9 8.5 - 10.5 mg/dL    Glucose 112 70 - 125 mg/dL    Alkaline Phosphatase 68 45 - 120 U/L    AST 36 0 - 40 U/L    ALT 35 0 - 45 U/L    Protein Total 7.7 6.0 - 8.0 g/dL    Albumin 4.3 3.5 - 5.0 g/dL    Bilirubin Total 0.5 0.0 - 1.0 mg/dL    GFR Estimate 69 >60 mL/min/1.73m2      Comment:      As of July 11, 2021, eGFR is calculated by the CKD-EPI creatinine equation, without race adjustment. eGFR can be influenced by muscle mass, exercise, and diet. The reported eGFR is an estimation only and is only applicable if the renal function is stable.       If you have any questions or concerns, please call the clinic at the number listed above.       Sincerely,      Yanick Salazar MD

## 2022-09-01 DIAGNOSIS — I10 ESSENTIAL HYPERTENSION: ICD-10-CM

## 2022-09-02 RX ORDER — LOSARTAN POTASSIUM 100 MG/1
TABLET ORAL
Qty: 90 TABLET | Refills: 0 | Status: SHIPPED | OUTPATIENT
Start: 2022-09-02 | End: 2022-10-12

## 2022-09-02 NOTE — TELEPHONE ENCOUNTER
"Routing refill request to provider for review/approval because:  BP not in range.    Last Written Prescription Date:  9/7/21  Last Fill Quantity: 90,  # refills: 3   Last office visit provider:  11/2/21     Requested Prescriptions   Pending Prescriptions Disp Refills     losartan (COZAAR) 100 MG tablet 90 tablet 3     Sig: [LOSARTAN (COZAAR) 100 MG TABLET] TAKE 1 TABLET BY MOUTH EVERY DAY       Angiotensin-II Receptors Failed - 9/2/2022 12:57 PM        Failed - Last blood pressure under 140/90 in past 12 months     BP Readings from Last 3 Encounters:   11/02/21 (!) 148/80   07/22/21 (!) 160/90   01/26/21 (!) 162/84                 Passed - Recent (12 mo) or future (30 days) visit within the authorizing provider's specialty     Patient has had an office visit with the authorizing provider or a provider within the authorizing providers department within the previous 12 mos or has a future within next 30 days. See \"Patient Info\" tab in inbasket, or \"Choose Columns\" in Meds & Orders section of the refill encounter.              Passed - Medication is active on med list        Passed - Patient is age 18 or older        Passed - No active pregnancy on record        Passed - Normal serum creatinine on file in past 12 months     Recent Labs   Lab Test 11/02/21  1037   CR 0.84       Ok to refill medication if creatinine is low          Passed - Normal serum potassium on file in past 12 months     Recent Labs   Lab Test 11/02/21  1037   POTASSIUM 4.2                    Passed - No positive pregnancy test in past 12 months             Duane Garcia RN 09/02/22 12:57 PM  "

## 2022-09-06 DIAGNOSIS — E78.00 PURE HYPERCHOLESTEROLEMIA: ICD-10-CM

## 2022-09-06 RX ORDER — ATORVASTATIN CALCIUM 80 MG/1
TABLET, FILM COATED ORAL
Qty: 90 TABLET | Refills: 0 | Status: SHIPPED | OUTPATIENT
Start: 2022-09-06 | End: 2022-10-12

## 2022-09-06 NOTE — TELEPHONE ENCOUNTER
"Routing refill request to provider for review/approval because:  Labs not current:  LDL    Last Written Prescription Date:  9/7/21  Last Fill Quantity: 90,  # refills: 3   Last office visit provider:  11/2/21     Requested Prescriptions   Pending Prescriptions Disp Refills     atorvastatin (LIPITOR) 80 MG tablet 90 tablet 3     Sig: [ATORVASTATIN (LIPITOR) 80 MG TABLET] TAKE 1 TABLET BY MOUTH EVERYDAY AT BEDTIME       Statins Protocol Failed - 9/6/2022  2:25 PM        Failed - LDL on file in past 12 months     Recent Labs   Lab Test 07/22/21  1230                Passed - No abnormal creatine kinase in past 12 months     No lab results found.             Passed - Recent (12 mo) or future (30 days) visit within the authorizing provider's specialty     Patient has had an office visit with the authorizing provider or a provider within the authorizing providers department within the previous 12 mos or has a future within next 30 days. See \"Patient Info\" tab in inbasket, or \"Choose Columns\" in Meds & Orders section of the refill encounter.              Passed - Medication is active on med list        Passed - Patient is age 18 or older        Passed - No active pregnancy on record        Passed - No positive pregnancy test in past 12 months             Duane Garcia RN 09/06/22 2:26 PM  "

## 2022-10-12 ENCOUNTER — OFFICE VISIT (OUTPATIENT)
Dept: INTERNAL MEDICINE | Facility: CLINIC | Age: 72
End: 2022-10-12
Payer: MEDICARE

## 2022-10-12 VITALS
SYSTOLIC BLOOD PRESSURE: 144 MMHG | WEIGHT: 181 LBS | DIASTOLIC BLOOD PRESSURE: 72 MMHG | OXYGEN SATURATION: 98 % | HEART RATE: 85 BPM | BODY MASS INDEX: 30.16 KG/M2 | HEIGHT: 65 IN

## 2022-10-12 DIAGNOSIS — Z00.00 ENCOUNTER FOR WELLNESS EXAMINATION IN ADULT: Primary | ICD-10-CM

## 2022-10-12 DIAGNOSIS — Z23 NEED FOR COVID-19 VACCINE: ICD-10-CM

## 2022-10-12 DIAGNOSIS — Z51.81 ENCOUNTER FOR THERAPEUTIC DRUG MONITORING: ICD-10-CM

## 2022-10-12 DIAGNOSIS — I10 ESSENTIAL HYPERTENSION: ICD-10-CM

## 2022-10-12 DIAGNOSIS — Z12.31 VISIT FOR SCREENING MAMMOGRAM: ICD-10-CM

## 2022-10-12 DIAGNOSIS — E78.00 PURE HYPERCHOLESTEROLEMIA: ICD-10-CM

## 2022-10-12 DIAGNOSIS — Z23 NEED FOR IMMUNIZATION AGAINST INFLUENZA: ICD-10-CM

## 2022-10-12 DIAGNOSIS — Z85.828 HISTORY OF SQUAMOUS CELL CARCINOMA OF SKIN: ICD-10-CM

## 2022-10-12 LAB
ALBUMIN SERPL BCG-MCNC: 4.7 G/DL (ref 3.5–5.2)
ALP SERPL-CCNC: 55 U/L (ref 35–104)
ALT SERPL W P-5'-P-CCNC: 23 U/L (ref 10–35)
ANION GAP SERPL CALCULATED.3IONS-SCNC: 12 MMOL/L (ref 7–15)
AST SERPL W P-5'-P-CCNC: 32 U/L (ref 10–35)
BILIRUB SERPL-MCNC: 0.4 MG/DL
BUN SERPL-MCNC: 14.8 MG/DL (ref 8–23)
CALCIUM SERPL-MCNC: 10.3 MG/DL (ref 8.8–10.2)
CHLORIDE SERPL-SCNC: 101 MMOL/L (ref 98–107)
CREAT SERPL-MCNC: 0.7 MG/DL (ref 0.51–0.95)
DEPRECATED HCO3 PLAS-SCNC: 27 MMOL/L (ref 22–29)
ERYTHROCYTE [DISTWIDTH] IN BLOOD BY AUTOMATED COUNT: 12.7 % (ref 10–15)
GFR SERPL CREATININE-BSD FRML MDRD: 90 ML/MIN/1.73M2
GLUCOSE SERPL-MCNC: 106 MG/DL (ref 70–99)
HCT VFR BLD AUTO: 40 % (ref 35–47)
HGB BLD-MCNC: 13.1 G/DL (ref 11.7–15.7)
LDLC SERPL DIRECT ASSAY-MCNC: 90 MG/DL
MCH RBC QN AUTO: 34.8 PG (ref 26.5–33)
MCHC RBC AUTO-ENTMCNC: 32.8 G/DL (ref 31.5–36.5)
MCV RBC AUTO: 106 FL (ref 78–100)
PLATELET # BLD AUTO: 200 10E3/UL (ref 150–450)
POTASSIUM SERPL-SCNC: 4.1 MMOL/L (ref 3.4–5.3)
PROT SERPL-MCNC: 7.7 G/DL (ref 6.4–8.3)
RBC # BLD AUTO: 3.76 10E6/UL (ref 3.8–5.2)
SODIUM SERPL-SCNC: 140 MMOL/L (ref 136–145)
WBC # BLD AUTO: 6.7 10E3/UL (ref 4–11)

## 2022-10-12 PROCEDURE — G0008 ADMIN INFLUENZA VIRUS VAC: HCPCS | Performed by: INTERNAL MEDICINE

## 2022-10-12 PROCEDURE — 99214 OFFICE O/P EST MOD 30 MIN: CPT | Mod: 25 | Performed by: INTERNAL MEDICINE

## 2022-10-12 PROCEDURE — G0439 PPPS, SUBSEQ VISIT: HCPCS | Performed by: INTERNAL MEDICINE

## 2022-10-12 PROCEDURE — 36415 COLL VENOUS BLD VENIPUNCTURE: CPT | Performed by: INTERNAL MEDICINE

## 2022-10-12 PROCEDURE — 85027 COMPLETE CBC AUTOMATED: CPT | Performed by: INTERNAL MEDICINE

## 2022-10-12 PROCEDURE — 90662 IIV NO PRSV INCREASED AG IM: CPT | Performed by: INTERNAL MEDICINE

## 2022-10-12 PROCEDURE — 91312 COVID-19,PF,PFIZER BOOSTER BIVALENT: CPT | Performed by: INTERNAL MEDICINE

## 2022-10-12 PROCEDURE — 0124A COVID-19,PF,PFIZER BOOSTER BIVALENT: CPT | Performed by: INTERNAL MEDICINE

## 2022-10-12 PROCEDURE — 83721 ASSAY OF BLOOD LIPOPROTEIN: CPT | Performed by: INTERNAL MEDICINE

## 2022-10-12 PROCEDURE — 80053 COMPREHEN METABOLIC PANEL: CPT | Performed by: INTERNAL MEDICINE

## 2022-10-12 RX ORDER — ATORVASTATIN CALCIUM 80 MG/1
TABLET, FILM COATED ORAL
Qty: 90 TABLET | Refills: 0 | Status: SHIPPED | OUTPATIENT
Start: 2022-10-12 | End: 2023-04-07

## 2022-10-12 RX ORDER — LOSARTAN POTASSIUM 100 MG/1
TABLET ORAL
Qty: 90 TABLET | Refills: 0 | Status: SHIPPED | OUTPATIENT
Start: 2022-10-12 | End: 2023-03-03

## 2022-10-12 RX ORDER — AMLODIPINE BESYLATE 5 MG/1
TABLET ORAL
Qty: 90 TABLET | Refills: 3 | Status: SHIPPED | OUTPATIENT
Start: 2022-10-12 | End: 2024-01-04

## 2022-10-12 ASSESSMENT — ENCOUNTER SYMPTOMS
FEVER: 0
CHILLS: 0
ARTHRALGIAS: 0
PALPITATIONS: 0
DYSURIA: 0
HEMATURIA: 0
HEMATOCHEZIA: 0
ABDOMINAL PAIN: 0
WEAKNESS: 0
DIZZINESS: 0
SHORTNESS OF BREATH: 0
BREAST MASS: 0
SORE THROAT: 0
PARESTHESIAS: 0
FREQUENCY: 0
CONSTIPATION: 0
HEARTBURN: 0
COUGH: 0
MYALGIAS: 0
EYE PAIN: 0
DIARRHEA: 0
NERVOUS/ANXIOUS: 0
NAUSEA: 0
JOINT SWELLING: 0
HEADACHES: 0

## 2022-10-12 ASSESSMENT — ACTIVITIES OF DAILY LIVING (ADL): CURRENT_FUNCTION: NO ASSISTANCE NEEDED

## 2022-10-12 NOTE — PROGRESS NOTES
SUBJECTIVE:   Karen is a 72 year old who presents for Preventive Visit.  Patient has been walking more regularly, every day now.  Walks about 30 minutes.  Good exercise tolerance.  She has improved energy level since walking.    Her blood pressure is also improved.  Blood pressure running 127/75 and 130/80 he has been to the dentist recently.    No significant lower extremity edema at this time, she did have some mild puffiness in the past.    She is taking her losartan 100 mg a day and amlodipine 5 mg a day.    No symptomatic palpitations.    She has some moderate daytime sleepiness but not severe.    She does continue to drink alcohol about 2 to 3 glasses in the evening most evenings, she is been doing that for many years.  She has been a  for a number of years.  Normal B12 level 2021.    Is never smoked.    No new abdominal pain or bloating.  Liver tests were normal November of last year.    No chest pain or chest pressure or increasing shortness of breath with exertion.    2019 cardiac CT scan calcium score of 255.   ultrasound of the carotids showed mild plaque but no stenosis.  Her father  of a heart attack.  She is on atorvastatin 80 mg a day with well-controlled cholesterol last year.  No generalized myalgias.    No epigastric pain or bleeding on aspirin 81 mg daily.    No headache complaints.    No vision changes and did see ophthalmology in the spring.  She states that they are monitoring her for glaucoma and she does get checked regularly.    Past history of squamous cell cancer of the skin, negative exam in  of this year.  No new skin lesions noted.    Negative mammogram 2021 and she has a mammogram scheduled in November.    She still sees her gynecologist because she is in a book club with her.    Total abdominal hysterectomy with BSO in .  No new problems.  No urinary tract infection complaints.    Normal DEXA scan in 2017.    2013 colonoscopy was  "negative.  No change in bowels or blood in stool.    No falls.    She reports his diet is good, eating plenty of vegetables, does still eat starchy foods.  Still overweight.  No history of diabetes.    Patient has been advised of split billing requirements and indicates understanding: Yes  Are you in the first 12 months of your Medicare coverage?  No    Healthy Habits:     In general, how would you rate your overall health?  Excellent    Frequency of exercise:  4-5 days/week    Duration of exercise:  15-30 minutes    Do you usually eat at least 4 servings of fruit and vegetables a day, include whole grains    & fiber and avoid regularly eating high fat or \"junk\" foods?  Yes    Taking medications regularly:  Yes    Medication side effects:  None    Ability to successfully perform activities of daily living:  No assistance needed    Home Safety:  No safety concerns identified    Hearing Impairment:  No hearing concerns    In the past 6 months, have you been bothered by leaking of urine?  No    In general, how would you rate your overall mental or emotional health?  Excellent      PHQ-2 Total Score: 0    Do you feel safe in your environment? Yes    Have you ever done Advance Care Planning? (For example, a Health Directive, POLST, or a discussion with a medical provider or your loved ones about your wishes): No, advance care planning information given to patient to review.  Patient plans to discuss their wishes with loved ones or provider.      Fall risk  Fallen 2 or more times in the past year?: No  Any fall with injury in the past year?: No  click delete button to remove this line now  Cognitive Screening   1) Repeat 3 items (Leader, Season, Table)    2) Clock draw: NORMAL  3) 3 item recall: Recalls 3 objects  Results: NORMAL clock, 1-2 items recalled: COGNITIVE IMPAIRMENT LESS LIKELY    Mini-CogTM Copyright PATRICIA Garnett. Licensed by the author for use in [facility] [employer]; reprinted with permission (oralia@.[facility]). " All rights reserved.      Do you have sleep apnea, excessive snoring or daytime drowsiness?: no    Reviewed and updated as needed this visit by clinical staff   Tobacco  Allergies  Meds              Reviewed and updated as needed this visit by Provider                 Social History     Tobacco Use     Smoking status: Never     Smokeless tobacco: Never   Substance Use Topics     Alcohol use: Yes     Comment: Alcoholic Drinks/day: wine 2-3 glasses of wine a day     If you drink alcohol do you typically have >3 drinks per day or >7 drinks per week? Yes    Alcohol Use 10/12/2022   Prescreen: >3 drinks/day or >7 drinks/week? No   No flowsheet data found.            Current providers sharing in care for this patient include:   Patient Care Team:  Yanick Salazar MD as PCP - General (Internal Medicine)  Yanick Salazar MD as Assigned PCP    The following health maintenance items are reviewed in Epic and correct as of today:  Health Maintenance   Topic Date Due     ANNUAL REVIEW OF HM ORDERS  Never done     HEPATITIS B IMMUNIZATION (1 of 3 - 3-dose series) Never done     HEPATITIS C SCREENING  Never done     ZOSTER IMMUNIZATION (1 of 2) Never done     MEDICARE ANNUAL WELLNESS VISIT  09/17/2020     MAMMO SCREENING  06/01/2021     COVID-19 Vaccine (3 - Booster for [place] series) 12/28/2021     INFLUENZA VACCINE (1) 09/01/2022     ADVANCE CARE PLANNING  07/05/2023     COLORECTAL CANCER SCREENING  07/10/2023     FALL RISK ASSESSMENT  10/12/2023     LIPID  07/22/2026     DTAP/TDAP/TD IMMUNIZATION (3 - Td or Tdap) 01/24/2027     DEXA  09/07/2032     PHQ-2 (once per calendar year)  Completed     Pneumococcal Vaccine: 65+ Years  Completed     IPV IMMUNIZATION  Aged Out     MENINGITIS IMMUNIZATION  Aged Out     Lab work is in process  Patient Active Problem List   Diagnosis     Transient Ischemic Attack     Macrocytosis without anemia     Hypercholesterolemia     Squamous cell carcinoma of skin, unspecified     HTN  (hypertension)     Past Surgical History:   Procedure Laterality Date     ANKLE SURGERY Left 2002     CATARACT EXTRACTION       HYSTERECTOMY       HYSTERECTOMY TOTAL ABDOMINAL, BILATERAL SALPINGO-OOPHORECTOMY, COMBINED Bilateral 1992       Social History     Tobacco Use     Smoking status: Never     Smokeless tobacco: Never   Substance Use Topics     Alcohol use: Yes     Comment: Alcoholic Drinks/day: wine 2-3 glasses of wine a day     Family History   Problem Relation Age of Onset     Coronary Artery Disease Father 70.00     Heart Disease Father      Dementia Mother          Current Outpatient Medications   Medication Sig Dispense Refill     amLODIPine (NORVASC) 5 MG tablet [AMLODIPINE (NORVASC) 5 MG TABLET] TAKE 1 TABLET BY MOUTH EVERY DAY 90 tablet 3     aspirin 81 MG EC tablet [ASPIRIN 81 MG EC TABLET] Take 1 tablet (81 mg total) by mouth daily.  0     atorvastatin (LIPITOR) 80 MG tablet [ATORVASTATIN (LIPITOR) 80 MG TABLET] TAKE 1 TABLET BY MOUTH EVERYDAY AT BEDTIME 90 tablet 0     calcium carbonate-vitamin D3 600 mg(1,500mg) -400 unit Chew [CALCIUM CARBONATE-VITAMIN D3 600 MG(1,500MG) -400 UNIT CHEW] Chew 1 tablet daily.              losartan (COZAAR) 100 MG tablet [LOSARTAN (COZAAR) 100 MG TABLET] TAKE 1 TABLET BY MOUTH EVERY DAY 90 tablet 0     MULTIVITAMIN ORAL [MULTIVITAMIN ORAL] Take 1 tablet by mouth daily.       No Known Allergies      Review of Systems   Constitutional: Negative for chills and fever.   HENT: Negative for congestion, ear pain, hearing loss and sore throat.    Eyes: Negative for pain and visual disturbance.   Respiratory: Negative for cough and shortness of breath.    Cardiovascular: Negative for chest pain, palpitations and peripheral edema.   Gastrointestinal: Negative for abdominal pain, constipation, diarrhea, heartburn, hematochezia and nausea.   Breasts:  Negative for tenderness, breast mass and discharge.   Genitourinary: Negative for dysuria, frequency, genital sores, hematuria,  "pelvic pain, urgency, vaginal bleeding and vaginal discharge.   Musculoskeletal: Negative for arthralgias, joint swelling and myalgias.   Skin: Negative for rash.   Neurological: Negative for dizziness, weakness, headaches and paresthesias.   Psychiatric/Behavioral: Negative for mood changes. The patient is not nervous/anxious.      Constitutional, HEENT, cardiovascular, pulmonary, GI, , musculoskeletal, neuro, skin, endocrine and psych systems are negative, except as otherwise noted.    OBJECTIVE:   BP (!) 144/72   Pulse 85   Ht 1.651 m (5' 5\")   Wt 82.1 kg (181 lb)   SpO2 98%   BMI 30.12 kg/m   Estimated body mass index is 30.12 kg/m  as calculated from the following:    Height as of this encounter: 1.651 m (5' 5\").    Weight as of this encounter: 82.1 kg (181 lb).  Physical Exam  Alert and oriented x3.  Normal mood and affect.  Normal clock face drawing and word recall.  Normal mobility.  Normal gait.  Pupils and irises are equal and reactive.  No jaundice or conjunctivitis.  Extraocular muscles intact.  External ears and nose exam is normal with minimal cerumen, normal tympanic membranes.  No cervical or supraclavicular or axillary adenopathy.  No JVD and no carotid bruits.  No thyromegaly or nodularity to palpation.  Lungs are clear to auscultation with good respiratory excursion.  Heart is regular with no murmur rub or gallop, she did have some intermittent premature beats.  No ankle edema.  +1 pedal pulses and feet in good condition without preulcerative calluses.  Thickened toenails have largely resolved.  Abdomen is mildly overweight but nontender.  Liver edge right at costal margin, no splenomegaly.  No pulsatile abdominal mass.  She declined breast exam.  Skin exam without suspicious skin lesions.    ASSESSMENT / PLAN:   (Z00.00) Encounter for wellness examination in adult  (primary encounter diagnosis)  Comment: Patient's main issue is cardiovascular risk, as well as her continued alcohol " use.    Patient was counseled to reduce alcohol.  We did discuss risk of sleep apnea.  Mild daytime sleepiness.  He declined referral to the sleep clinic at this time.    I counseled her to continue to walk on a regular basis, work on diet and reduced carbohydrates.  Plan: Full Code        Code discussed, full CODE STATUS.    She sees ophthalmology regularly for eye exams, glaucoma suspect, but eye pressures have been better recently.    (Z12.31) Visit for screening mammogram  Comment: Continue yearly mammogram, currently scheduled in November  Plan:     (I10) Essential hypertension  Comment: Controlled.  Borderline high in clinic.  Continue to work on weight loss and regular walking.  Plan: losartan (COZAAR) 100 MG tablet, amLODIPine         (NORVASC) 5 MG tablet        We discussed consideration for referral to the sleep clinic with possibility for sleep apnea but she does not believe she has sleep apnea and did not want a referral to sleep clinic at this time.  Continue to work on weight loss and reduce alcohol.    Continue on current blood pressure medications and follow-up in the spring for blood pressure follow-up appointment.    (E78.00) Pure hypercholesterolemia  Comment: Goal LDL less than 100 with a strong family history and positive cardiac CT scan.  Plan: atorvastatin (LIPITOR) 80 MG tablet, LDL         cholesterol direct        No evidence of toxicity with aspirin    (Z51.81) Encounter for therapeutic drug monitoring  Comment:   Plan: Comprehensive metabolic panel, CBC with         platelets            (Z85.828) History of squamous cell carcinoma of skin  Comment: Sees dermatology later this year, she states she has a 6-month follow-up plan  Plan: No new skin lesions at this time    (Z23) Need for COVID-19 vaccine  Comment: Given today  Plan: COVID-19,PF,PFIZER BOOSTER BIVALENT            (Z23) Need for immunization against influenza  Comment: Given today  Plan: INFLUENZA, QUAD, HD, PF, 65+ (FLUZONE  "HD)              Patient has been advised of split billing requirements and indicates understanding: Yes    COUNSELING:  Reviewed preventive health counseling, as reflected in patient instructions       Regular exercise       Healthy diet/nutrition       Vision screening       Colon cancer screening    Estimated body mass index is 30.12 kg/m  as calculated from the following:    Height as of this encounter: 1.651 m (5' 5\").    Weight as of this encounter: 82.1 kg (181 lb).    Weight management plan: Discussed healthy diet and exercise guidelines    She reports that she has never smoked. She has never used smokeless tobacco.      Appropriate preventive services were discussed with this patient, including applicable screening as appropriate for cardiovascular disease, diabetes, osteopenia/osteoporosis, and glaucoma.  As appropriate for age/gender, discussed screening for colorectal cancer, prostate cancer, breast cancer, and cervical cancer. Checklist reviewing preventive services available has been given to the patient.    Reviewed patients plan of care and provided an AVS. The Basic Care Plan (routine screening as documented in Health Maintenance) for Karen meets the Care Plan requirement. This Care Plan has been established and reviewed with the Patient.    Counseling Resources:  ATP IV Guidelines  Pooled Cohorts Equation Calculator  Breast Cancer Risk Calculator  Breast Cancer: Medication to Reduce Risk  FRAX Risk Assessment  ICSI Preventive Guidelines  Dietary Guidelines for Americans, 2010  [facility]'s MyPlate  ASA Prophylaxis  Lung CA Screening    Yanick Salazar MD  [facility]    Identified Health Risks:  "

## 2022-10-12 NOTE — PATIENT INSTRUCTIONS
Continue to walk on a daily basis as you are doing.    Continue to avoid sweets or starchy foods in diet, with goal for further weight loss.  I would recommend reducing alcohol to no more than 1 drink a day.    Proceed with mammogram this fall, as planned.    See dermatology for follow-up as planned.    See ophthalmology for glaucoma screening as planned.    You could consider the Shingrix shingles vaccine, that can be obtained at local drugstore.    Continue to follow your blood pressure.  Goal blood pressure less than 135/85, but not less than 110/60.  Contact me if blood pressure is running too high or too low.  Otherwise see me in 6 months for routine blood pressure checkup appointment.    Your colonoscopy will be due July 2023

## 2022-10-12 NOTE — LETTER
October 13, 2022      Karen iL  [address]  [place] [address]        Dear ,    We are writing to inform you of your test results.    LDL cholesterol level is well controlled.    Liver tests are normal.    Kidney labs are normal.    Calcium level is okay.    Borderline blood glucose of 106 is okay.    Hemoglobin level is normal.  Elevated MCV measurement likely associated with alcohol intake.  I would recommend reduction in alcohol.    Otherwise, no change in treatment plan.    Resulted Orders   Comprehensive metabolic panel   Result Value Ref Range    Sodium 140 136 - 145 mmol/L    Potassium 4.1 3.4 - 5.3 mmol/L    Chloride 101 98 - 107 mmol/L    Carbon Dioxide (CO2) 27 22 - 29 mmol/L    Anion Gap 12 7 - 15 mmol/L    Urea Nitrogen 14.8 8.0 - 23.0 mg/dL    Creatinine 0.70 0.51 - 0.95 mg/dL    Calcium 10.3 (H) 8.8 - 10.2 mg/dL    Glucose 106 (H) 70 - 99 mg/dL    Alkaline Phosphatase 55 35 - 104 U/L    AST 32 10 - 35 U/L    ALT 23 10 - 35 U/L    Protein Total 7.7 6.4 - 8.3 g/dL    Albumin 4.7 3.5 - 5.2 g/dL    Bilirubin Total 0.4 <=1.2 mg/dL    GFR Estimate 90 >60 mL/min/1.73m2      Comment:      Effective December 21, 2021 eGFRcr in adults is calculated using the 2021 CKD-EPI creatinine equation which includes age and gender (Javier vazquez al., [place], DOI: 10.1056/UBCZqs1274185)   CBC with platelets   Result Value Ref Range    WBC Count 6.7 4.0 - 11.0 10e3/uL    RBC Count 3.76 (L) 3.80 - 5.20 10e6/uL    Hemoglobin 13.1 11.7 - 15.7 g/dL    Hematocrit 40.0 35.0 - 47.0 %     (H) 78 - 100 fL    MCH 34.8 (H) 26.5 - 33.0 pg    MCHC 32.8 31.5 - 36.5 g/dL    RDW 12.7 10.0 - 15.0 %    Platelet Count 200 150 - 450 10e3/uL   LDL cholesterol direct   Result Value Ref Range    LDL Cholesterol Direct 90 <100 mg/dL      Comment:      Age 2-19 years:  Desirable: 0-110 mg/dL   Borderline high: 110-129 mg/dL   High: >= 130 mg/dL    Age 20 years and older:  Desirable: <100mg/dL  Above desirable: 100-129 mg/dL   Borderline  high: 130-159 mg/dL   High: 160-189 mg/dL   Very high: >= 190 mg/dL       If you have any questions or concerns, please call the clinic at the number listed above.       Sincerely,      Yanick Salazar MD

## 2023-03-02 DIAGNOSIS — I10 ESSENTIAL HYPERTENSION: ICD-10-CM

## 2023-03-03 RX ORDER — LOSARTAN POTASSIUM 100 MG/1
TABLET ORAL
Qty: 90 TABLET | Refills: 1 | Status: SHIPPED | OUTPATIENT
Start: 2023-03-03 | End: 2023-09-01

## 2023-03-03 NOTE — TELEPHONE ENCOUNTER
"Routing refill request to provider for review/approval because:  BP     Last Written Prescription Date:  10/12/22  Last Fill Quantity: 90,  # refills: 0   Last office visit provider:   10/12/22    Requested Prescriptions   Pending Prescriptions Disp Refills     losartan (COZAAR) 100 MG tablet 90 tablet 0     Sig: [LOSARTAN (COZAAR) 100 MG TABLET] TAKE 1 TABLET BY MOUTH EVERY DAY       Angiotensin-II Receptors Failed - 3/3/2023  2:07 PM        Failed - Last blood pressure under 140/90 in past 12 months     BP Readings from Last 3 Encounters:   10/12/22 (!) 144/72   11/02/21 (!) 148/80   07/22/21 (!) 160/90                 Passed - Recent (12 mo) or future (30 days) visit within the authorizing provider's specialty     Patient has had an office visit with the authorizing provider or a provider within the authorizing providers department within the previous 12 mos or has a future within next 30 days. See \"Patient Info\" tab in inbasket, or \"Choose Columns\" in Meds & Orders section of the refill encounter.              Passed - Medication is active on med list        Passed - Patient is age 18 or older        Passed - No active pregnancy on record        Passed - Normal serum creatinine on file in past 12 months     Recent Labs   Lab Test 10/12/22  1401   CR 0.70       Ok to refill medication if creatinine is low          Passed - Normal serum potassium on file in past 12 months     Recent Labs   Lab Test 10/12/22  1401   POTASSIUM 4.1                    Passed - No positive pregnancy test in past 12 months             NIXON OCONNOR RN 03/03/23 2:08 PM  "

## 2023-04-06 DIAGNOSIS — E78.00 PURE HYPERCHOLESTEROLEMIA: ICD-10-CM

## 2023-04-07 RX ORDER — ATORVASTATIN CALCIUM 80 MG/1
TABLET, FILM COATED ORAL
Qty: 90 TABLET | Refills: 3 | Status: SHIPPED | OUTPATIENT
Start: 2023-04-07 | End: 2024-04-01

## 2023-04-07 NOTE — TELEPHONE ENCOUNTER
"Last Written Prescription Date: 10/12/2022  Last Fill Quantity: 90,  # refills: 0  Last office visit provider: 10/12/2022    Requested Prescriptions   Pending Prescriptions Disp Refills     atorvastatin (LIPITOR) 80 MG tablet 90 tablet 0     Sig: [ATORVASTATIN (LIPITOR) 80 MG TABLET] TAKE 1 TABLET BY MOUTH EVERYDAY AT BEDTIME       Statins Protocol Passed - 4/7/2023  2:01 PM        Passed - LDL on file in past 12 months     Recent Labs   Lab Test 10/12/22  1401   LDL 90             Passed - No abnormal creatine kinase in past 12 months     No lab results found.             Passed - Recent (12 mo) or future (30 days) visit within the authorizing provider's specialty     Patient has had an office visit with the authorizing provider or a provider within the authorizing providers department within the previous 12 mos or has a future within next 30 days. See \"Patient Info\" tab in inbasket, or \"Choose Columns\" in Meds & Orders section of the refill encounter.              Passed - Medication is active on med list        Passed - Patient is age 18 or older        Passed - No active pregnancy on record        Passed - No positive pregnancy test in past 12 months             Tu Dunn RN 04/07/23 2:03 PM  "

## 2023-09-01 DIAGNOSIS — I10 ESSENTIAL HYPERTENSION: ICD-10-CM

## 2023-09-01 RX ORDER — LOSARTAN POTASSIUM 100 MG/1
TABLET ORAL
Qty: 90 TABLET | Refills: 1 | Status: SHIPPED | OUTPATIENT
Start: 2023-09-01 | End: 2024-02-16

## 2023-09-01 NOTE — TELEPHONE ENCOUNTER
"Routing refill request to provider for review/approval because:  BP out of range in past 12 months    Last Written Prescription Date:  3/3/2023  Last Fill Quantity: 90,  # refills: 1   Last office visit: 10/12/2022           Requested Prescriptions   Pending Prescriptions Disp Refills    losartan (COZAAR) 100 MG tablet [Pharmacy Med Name: LOSARTAN POTASSIUM 100 MG TAB] 90 tablet 1     Sig: [LOSARTAN (COZAAR) 100 MG TABLET] TAKE 1 TABLET BY MOUTH EVERY DAY       Angiotensin-II Receptors Failed - 9/1/2023 12:19 AM        Failed - Last blood pressure under 140/90 in past 12 months     BP Readings from Last 3 Encounters:   10/12/22 (!) 144/72   11/02/21 (!) 148/80   07/22/21 (!) 160/90                 Passed - Recent (12 mo) or future (30 days) visit within the authorizing provider's specialty     Patient has had an office visit with the authorizing provider or a provider within the authorizing providers department within the previous 12 mos or has a future within next 30 days. See \"Patient Info\" tab in inbasket, or \"Choose Columns\" in Meds & Orders section of the refill encounter.              Passed - Medication is active on med list        Passed - Patient is age 18 or older        Passed - No active pregnancy on record        Passed - Normal serum creatinine on file in past 12 months     Recent Labs   Lab Test 10/12/22  1401   CR 0.70       Ok to refill medication if creatinine is low          Passed - Normal serum potassium on file in past 12 months     Recent Labs   Lab Test 10/12/22  1401   POTASSIUM 4.1                    Passed - No positive pregnancy test in past 12 months             Lisandra Hickey RN 09/01/23 2:12 AM  "

## 2023-11-14 ENCOUNTER — ANCILLARY PROCEDURE (OUTPATIENT)
Dept: MAMMOGRAPHY | Facility: HOSPITAL | Age: 73
End: 2023-11-14
Attending: OBSTETRICS & GYNECOLOGY
Payer: MEDICARE

## 2023-11-14 DIAGNOSIS — Z12.31 VISIT FOR SCREENING MAMMOGRAM: ICD-10-CM

## 2023-11-14 PROCEDURE — 77067 SCR MAMMO BI INCL CAD: CPT

## 2023-12-05 ENCOUNTER — OFFICE VISIT (OUTPATIENT)
Dept: INTERNAL MEDICINE | Facility: CLINIC | Age: 73
End: 2023-12-05
Payer: MEDICARE

## 2023-12-05 VITALS
BODY MASS INDEX: 29.82 KG/M2 | HEIGHT: 65 IN | TEMPERATURE: 98.4 F | DIASTOLIC BLOOD PRESSURE: 78 MMHG | RESPIRATION RATE: 16 BRPM | WEIGHT: 179 LBS | HEART RATE: 90 BPM | OXYGEN SATURATION: 96 % | SYSTOLIC BLOOD PRESSURE: 160 MMHG

## 2023-12-05 DIAGNOSIS — G44.209 TENSION HEADACHE: ICD-10-CM

## 2023-12-05 DIAGNOSIS — E78.00 PURE HYPERCHOLESTEROLEMIA: Primary | ICD-10-CM

## 2023-12-05 DIAGNOSIS — I10 HYPERTENSION, UNSPECIFIED TYPE: ICD-10-CM

## 2023-12-05 LAB
ALBUMIN SERPL BCG-MCNC: 4.9 G/DL (ref 3.5–5.2)
ALP SERPL-CCNC: 63 U/L (ref 40–150)
ALT SERPL W P-5'-P-CCNC: 26 U/L (ref 0–50)
ANION GAP SERPL CALCULATED.3IONS-SCNC: 12 MMOL/L (ref 7–15)
AST SERPL W P-5'-P-CCNC: 32 U/L (ref 0–45)
BILIRUB SERPL-MCNC: 0.5 MG/DL
BUN SERPL-MCNC: 20.6 MG/DL (ref 8–23)
CALCIUM SERPL-MCNC: 10 MG/DL (ref 8.8–10.2)
CHLORIDE SERPL-SCNC: 102 MMOL/L (ref 98–107)
CHOLEST SERPL-MCNC: 221 MG/DL
CREAT SERPL-MCNC: 0.77 MG/DL (ref 0.51–0.95)
DEPRECATED HCO3 PLAS-SCNC: 27 MMOL/L (ref 22–29)
EGFRCR SERPLBLD CKD-EPI 2021: 80 ML/MIN/1.73M2
ERYTHROCYTE [DISTWIDTH] IN BLOOD BY AUTOMATED COUNT: 12.9 % (ref 10–15)
GLUCOSE SERPL-MCNC: 110 MG/DL (ref 70–99)
HCT VFR BLD AUTO: 41.3 % (ref 35–47)
HDLC SERPL-MCNC: 74 MG/DL
HGB BLD-MCNC: 13.4 G/DL (ref 11.7–15.7)
LDLC SERPL CALC-MCNC: 123 MG/DL
MCH RBC QN AUTO: 34.4 PG (ref 26.5–33)
MCHC RBC AUTO-ENTMCNC: 32.4 G/DL (ref 31.5–36.5)
MCV RBC AUTO: 106 FL (ref 78–100)
NONHDLC SERPL-MCNC: 147 MG/DL
PLATELET # BLD AUTO: 229 10E3/UL (ref 150–450)
POTASSIUM SERPL-SCNC: 4.5 MMOL/L (ref 3.4–5.3)
PROT SERPL-MCNC: 8.3 G/DL (ref 6.4–8.3)
RBC # BLD AUTO: 3.9 10E6/UL (ref 3.8–5.2)
SODIUM SERPL-SCNC: 141 MMOL/L (ref 135–145)
TRIGL SERPL-MCNC: 118 MG/DL
WBC # BLD AUTO: 7.5 10E3/UL (ref 4–11)

## 2023-12-05 PROCEDURE — 80061 LIPID PANEL: CPT | Performed by: NURSE PRACTITIONER

## 2023-12-05 PROCEDURE — 90662 IIV NO PRSV INCREASED AG IM: CPT | Performed by: NURSE PRACTITIONER

## 2023-12-05 PROCEDURE — G0008 ADMIN INFLUENZA VIRUS VAC: HCPCS | Performed by: NURSE PRACTITIONER

## 2023-12-05 PROCEDURE — 85027 COMPLETE CBC AUTOMATED: CPT | Performed by: NURSE PRACTITIONER

## 2023-12-05 PROCEDURE — 99214 OFFICE O/P EST MOD 30 MIN: CPT | Mod: 25 | Performed by: NURSE PRACTITIONER

## 2023-12-05 PROCEDURE — 36415 COLL VENOUS BLD VENIPUNCTURE: CPT | Performed by: NURSE PRACTITIONER

## 2023-12-05 PROCEDURE — 80053 COMPREHEN METABOLIC PANEL: CPT | Performed by: NURSE PRACTITIONER

## 2023-12-05 ASSESSMENT — ENCOUNTER SYMPTOMS: HEADACHES: 1

## 2023-12-05 NOTE — PROGRESS NOTES
Assessment & Plan     Tension headache  Back in October, she had a sudden acute onset of a severe tension type headache.  Symptoms waxed and waned for a few weeks and eventually resolved.  She is essentially asymptomatic in the exam room.  She did not have any neurological deficits during this episode.  Normal neurological exam today.  We talked about getting a CT scan of her head but based on resolution of her symptoms and normal exam today, she politely declined.  If her symptoms recur return, consider imaging    Back in the early 2000's she had an episode with difficulty finding words with a severe stress reaction.  She was brought to the emergency room for evaluation.  Questionable TIA at that time.    She had an MRI in 2008.  There was no evidence of stroke on that study      - Comprehensive metabolic panel; Future  - CBC with platelets; Future    Hypercholesterolemia  History of elevated coronary artery calcium score.  On high-dose atorvastatin and baby aspirin  - Lipid panel reflex to direct LDL Fasting; Future    Hypertension, unspecified type  Mildly elevated today.  History of whitecoat hypertension.  She has a follow-up appointment with her PCP in 1 month    Patient Instructions   We will update your lab work today.    No changes in medications.    Continue to monitor your headache symptoms.  If they return, call the clinic and let me know and we can consider getting a CT scan of your head at that time.    Normal neurological exam today.    Follow-up with Dr. Lee in January      [facility]    Bianca Jones is a 73 year old, presenting for the following health issues:    Headache (Had such a bad headache she couldn't get out of bed, intense pain x couple days, let up some but lasted about 1 month, still has some soreness behind left ear )      12/5/2023     9:06 AM   Additional Questions   Roomed by Thao TAPIA       Headache     History of  "Present Illness       Headaches:   Since the patient's last clinic visit, headaches are: improved  The patient is getting headaches:  Had them for a month but no longer  She is not able to do normal daily activities when she has a migraine.  The patient is taking the following rescue/relief medications:  No rescue/relief medications   Patient states \"I get some relief\" from the rescue/relief medications.   The patient is taking the following medications to prevent migraines:  No medications to prevent migraines  In the past 4 weeks, the patient has gone to an Urgent Care or Emergency Room 0 times times due to headaches.She consumes 0 sweetened beverage(s) daily.She exercises with enough effort to increase her heart rate 10 to 19 minutes per day.  She exercises with enough effort to increase her heart rate 5 days per week.   She is taking medications regularly.       Here for an episode in October where she experienced a severe headache.  That headache has eventually resolved and now she is feeling well.      Review of Systems   Neurological:  Positive for headaches.      Constitutional, HEENT, cardiovascular, pulmonary, gi and gu systems are negative, except as otherwise noted.      Objective    BP (!) 160/78 (BP Location: Right arm, Patient Position: Sitting, Cuff Size: Adult Regular)   Pulse 90   Temp 98.4  F (36.9  C)   Resp 16   Ht 1.651 m (5' 5\")   Wt 81.2 kg (179 lb)   SpO2 96%   BMI 29.79 kg/m    Body mass index is 29.79 kg/m .  Physical Exam   Normal neurological exam              "

## 2023-12-05 NOTE — LETTER
December 6, 2023      Karen Li  [address] [place] [address]        Dear ,    We are writing to inform you of your test results.    LDL a bit higher this year.  Kidney and liver labs normal.  Blood cell counts look good.  Follow-up with Dr. Lee in 1 month.  Let me know if the headaches return    Resulted Orders   Lipid panel reflex to direct LDL Fasting   Result Value Ref Range    Cholesterol 221 (H) <200 mg/dL    Triglycerides 118 <150 mg/dL    Direct Measure HDL 74 >=50 mg/dL    LDL Cholesterol Calculated 123 (H) <=100 mg/dL    Non HDL Cholesterol 147 (H) <130 mg/dL    Narrative    Cholesterol  Desirable:  <200 mg/dL    Triglycerides  Normal:  Less than 150 mg/dL  Borderline High:  150-199 mg/dL  High:  200-499 mg/dL  Very High:  Greater than or equal to 500 mg/dL    Direct Measure HDL  Female:  Greater than or equal to 50 mg/dL   Male:  Greater than or equal to 40 mg/dL    LDL Cholesterol  Desirable:  <100mg/dL  Above Desirable:  100-129 mg/dL   Borderline High:  130-159 mg/dL   High:  160-189 mg/dL   Very High:  >= 190 mg/dL    Non HDL Cholesterol  Desirable:  130 mg/dL  Above Desirable:  130-159 mg/dL  Borderline High:  160-189 mg/dL  High:  190-219 mg/dL  Very High:  Greater than or equal to 220 mg/dL   Comprehensive metabolic panel   Result Value Ref Range    Sodium 141 135 - 145 mmol/L      Comment:      Reference intervals for this test were updated on 09/26/2023 to more accurately reflect our healthy population. There may be differences in the flagging of prior results with similar values performed with this method. Interpretation of those prior results can be made in the context of the updated reference intervals.     Potassium 4.5 3.4 - 5.3 mmol/L    Carbon Dioxide (CO2) 27 22 - 29 mmol/L    Anion Gap 12 7 - 15 mmol/L    Urea Nitrogen 20.6 8.0 - 23.0 mg/dL    Creatinine 0.77 0.51 - 0.95 mg/dL    GFR Estimate 80 >60 mL/min/1.73m2    Calcium 10.0 8.8 - 10.2 mg/dL    Chloride 102 98 - 107  mmol/L    Glucose 110 (H) 70 - 99 mg/dL    Alkaline Phosphatase 63 40 - 150 U/L      Comment:      Reference intervals for this test were updated on 11/14/2023 to more accurately reflect our healthy population. There may be differences in the flagging of prior results with similar values performed with this method. Interpretation of those prior results can be made in the context of the updated reference intervals.    AST 32 0 - 45 U/L      Comment:      Reference intervals for this test were updated on 6/12/2023 to more accurately reflect our healthy population. There may be differences in the flagging of prior results with similar values performed with this method. Interpretation of those prior results can be made in the context of the updated reference intervals.    ALT 26 0 - 50 U/L      Comment:      Reference intervals for this test were updated on 6/12/2023 to more accurately reflect our healthy population. There may be differences in the flagging of prior results with similar values performed with this method. Interpretation of those prior results can be made in the context of the updated reference intervals.      Protein Total 8.3 6.4 - 8.3 g/dL    Albumin 4.9 3.5 - 5.2 g/dL    Bilirubin Total 0.5 <=1.2 mg/dL   CBC with platelets   Result Value Ref Range    WBC Count 7.5 4.0 - 11.0 10e3/uL    RBC Count 3.90 3.80 - 5.20 10e6/uL    Hemoglobin 13.4 11.7 - 15.7 g/dL    Hematocrit 41.3 35.0 - 47.0 %     (H) 78 - 100 fL    MCH 34.4 (H) 26.5 - 33.0 pg    MCHC 32.4 31.5 - 36.5 g/dL    RDW 12.9 10.0 - 15.0 %    Platelet Count 229 150 - 450 10e3/uL       If you have any questions or concerns, please call the clinic at the number listed above.       Sincerely,      Lamberto Maddox, CNP

## 2023-12-05 NOTE — PATIENT INSTRUCTIONS
We will update your lab work today.    No changes in medications.    Continue to monitor your headache symptoms.  If they return, call the clinic and let me know and we can consider getting a CT scan of your head at that time.    Normal neurological exam today.    Follow-up with Dr. Lee in January

## 2024-01-04 ENCOUNTER — OFFICE VISIT (OUTPATIENT)
Dept: INTERNAL MEDICINE | Facility: CLINIC | Age: 74
End: 2024-01-04
Payer: MEDICARE

## 2024-01-04 VITALS
BODY MASS INDEX: 28.94 KG/M2 | RESPIRATION RATE: 16 BRPM | HEART RATE: 82 BPM | TEMPERATURE: 97.1 F | OXYGEN SATURATION: 98 % | SYSTOLIC BLOOD PRESSURE: 158 MMHG | DIASTOLIC BLOOD PRESSURE: 84 MMHG | WEIGHT: 180.1 LBS | HEIGHT: 66 IN

## 2024-01-04 DIAGNOSIS — R73.9 HYPERGLYCEMIA: ICD-10-CM

## 2024-01-04 DIAGNOSIS — E78.00 HYPERCHOLESTEROLEMIA: ICD-10-CM

## 2024-01-04 DIAGNOSIS — D75.89 MACROCYTOSIS WITHOUT ANEMIA: ICD-10-CM

## 2024-01-04 DIAGNOSIS — Z85.828 HISTORY OF SQUAMOUS CELL CARCINOMA OF SKIN: ICD-10-CM

## 2024-01-04 DIAGNOSIS — R93.1 AGATSTON CORONARY ARTERY CALCIUM SCORE BETWEEN 200 AND 399: ICD-10-CM

## 2024-01-04 DIAGNOSIS — Z23 NEED FOR COVID-19 VACCINE: ICD-10-CM

## 2024-01-04 DIAGNOSIS — R53.83 FATIGUE, UNSPECIFIED TYPE: ICD-10-CM

## 2024-01-04 DIAGNOSIS — I10 ESSENTIAL HYPERTENSION: ICD-10-CM

## 2024-01-04 DIAGNOSIS — Z12.11 SCREEN FOR COLON CANCER: ICD-10-CM

## 2024-01-04 DIAGNOSIS — Z00.00 ENCOUNTER FOR MEDICARE ANNUAL WELLNESS EXAM: Primary | ICD-10-CM

## 2024-01-04 PROCEDURE — G0439 PPPS, SUBSEQ VISIT: HCPCS | Performed by: INTERNAL MEDICINE

## 2024-01-04 PROCEDURE — 90480 ADMN SARSCOV2 VAC 1/ONLY CMP: CPT | Performed by: INTERNAL MEDICINE

## 2024-01-04 PROCEDURE — 99214 OFFICE O/P EST MOD 30 MIN: CPT | Mod: 25 | Performed by: INTERNAL MEDICINE

## 2024-01-04 PROCEDURE — 91320 SARSCV2 VAC 30MCG TRS-SUC IM: CPT | Performed by: INTERNAL MEDICINE

## 2024-01-04 ASSESSMENT — ENCOUNTER SYMPTOMS
HEMATOCHEZIA: 0
HEMATURIA: 0
CONSTIPATION: 0
MYALGIAS: 0
NAUSEA: 0
DIZZINESS: 0
EYE PAIN: 0
DIARRHEA: 0
SORE THROAT: 0
WEAKNESS: 0
SHORTNESS OF BREATH: 0
FREQUENCY: 0
FEVER: 0
DYSURIA: 0
HEARTBURN: 0
ARTHRALGIAS: 0
NERVOUS/ANXIOUS: 0
ABDOMINAL PAIN: 0
PALPITATIONS: 0
JOINT SWELLING: 0
COUGH: 0
HEADACHES: 0
CHILLS: 0
PARESTHESIAS: 0

## 2024-01-04 ASSESSMENT — ACTIVITIES OF DAILY LIVING (ADL): CURRENT_FUNCTION: NO ASSISTANCE NEEDED

## 2024-01-04 NOTE — PATIENT INSTRUCTIONS
Contact [facility] to set up your screening colonoscopy.  If this colonoscopy does not show any polyps, you will not need to do further colonoscopy screening.    Continue your yearly mammograms in November.    See dermatology for your yearly skin cancer exam.  But I did not note any concerning skin lesions on today's exam.    Continue to see your eye doctor regularly as you are doing.    Goal blood pressure less than 135/85.  I would have you consider adding hydrochlorothiazide to your blood pressure medications to keep your blood pressure within range.  Regular exercise, low-salt diet and weight loss can also reduce your blood pressure.    Check blood pressure at least a couple times a week over the next month and bring in your blood pressure cuff to your next clinic visit with me, to discuss blood pressure management.  Continue on your current losartan and amlodipine at this time.    Waking up with a headache can sometimes be from a very high blood pressure at night, which can be associated with sleep apnea.  Headaches can be from malpositioning of the neck muscles or from jaw clenching at night.    I would recommend you moderate alcohol and avoid more than 2 drinks in the evening.  You could consider a sleep evaluation for sleep apnea in the future.

## 2024-01-04 NOTE — PROGRESS NOTES
SUBJECTIVE:   Karen is a 73 year old, presenting for the following:  Wellness Visit (Mammo 11/14/23 DXA 9/07/17 Colon 7/10/13)  But no, lives with her 3 dogs and she does have 1 dog that is getting old and ill at this time.  She has been less active and not walking daily because of the ill dog.  She still walks intermittently and has good exertional ability.  No chest pain or increasing shortness of breath with exertion.    She does have a family history of heart attack with her dad having a heart attack.  2019 cardiac CT scan calcium score of 255.  2019 carotid ultrasound mild plaque but no stenosis.    She is taking atorvastatin 80 mg a day.  Previously her cholesterol levels were fairly well-controlled although last month her LDL was up to 123 with an HDL 74.    Her blood sugar was also 110.    Normal kidney and liver test.  She had just left  Normal hemogram except for MCV is 106.    She does drink wine consistently every evening 2 to 3 glasses.  She denies any falls and denies escalation of that is a problem and she does not wish to change that.  She lives by herself with her dogs and wishes to continue her current routine.    She denies significant daytime sleepiness and feels she sleeps adequately at night and does not have sleep apnea and did not want evaluation for that.  She had a normal B12 level in 2021.    She is never smoked.  No new cough or breathing issues.    She had an episode in October where she woke up in the morning with a severe headache.  She had continued headache and some neck pain that slowly resolved over the next couple of weeks.  She did not have a head scan.  No vision symptoms.  No headache at this time.  She has not had a recurrence of that severe headache.    Her blood pressure has been on the high side, although she states she is not checking it regularly as she cannot get consistent numbers with her blood pressure cuff.  In October 2022 blood pressure was 144/72.    She is on the  losartan 100 mg a day and amlodipine 5 mg a day.    She occasionally gets some puffiness of her ankles but not at this time.    Past history of squamous cell cancer but denies new skin lesions.  It has been over a year since he seen the dermatologist.    She has a glaucoma suspect condition and there checking every 4 months but she reports her eye pressures have been controlled and no new vision changes.    Status post hysterectomy in 1992.  He still sees her gynecology doctor intermittently as she is in the same book club with her.  But no new GYN complaints.  She had a mammogram November 2023 that was negative.    Her bowels are normal.  No blood in stool.  July 2013 colonoscopy was negative with a 10-year follow-up plan.    She denies depression and does not wish to consider SSRI.          1/4/2024     1:16 PM   Additional Questions   Roomed by Eliza       Are you in the first 12 months of your Medicare coverage?  No        Today's PHQ-2 Score:       1/4/2024     1:20 PM   PHQ-2 ( 1999 Pfizer)   Q1: Little interest or pleasure in doing things 0   Q2: Feeling down, depressed or hopeless 0   PHQ-2 Score 0   Q1: Little interest or pleasure in doing things Not at all   Q2: Feeling down, depressed or hopeless Not at all   PHQ-2 Score 0           Have you ever done Advance Care Planning? (For example, a Health Directive, POLST, or a discussion with a medical provider or your loved ones about your wishes): No, advance care planning information given to patient to review.  Patient plans to discuss their wishes with loved ones or provider.         Fall risk  Fallen 2 or more times in the past year?: No  Any fall with injury in the past year?: No    Cognitive Screening   1) Repeat 3 items (Leader, Season, Table)    2) Clock draw: NORMAL  3) 3 item recall: Recalls 3 objects  Results: NORMAL clock, 1-2 items recalled: COGNITIVE IMPAIRMENT LESS LIKELY    Mini-CogTM Copyright S Tamir. Licensed by the author for use in [employer]; reprinted with permission (soob@[facility]). All rights reserved.      Do you have sleep apnea, excessive snoring or daytime drowsiness? : no    Reviewed and updated as needed this visit by clinical staff   Tobacco  Allergies  Meds              Reviewed and updated as needed this visit by Provider                 Social History     Tobacco Use    Smoking status: Never     Passive exposure: Never    Smokeless tobacco: Never   Substance Use Topics    Alcohol use: Yes     Comment: Alcoholic Drinks/day: wine 2-3 glasses of wine a day             1/4/2024     1:19 PM   Alcohol Use   Prescreen: >3 drinks/day or >7 drinks/week? Yes   AUDIT SCORE  8     Do you have a current opioid prescription? No  Do you use any other controlled substances or medications that are not prescribed by a provider? None              Current providers sharing in care for this patient include:   Patient Care Team:  Yanick Salazar MD as PCP - General (Internal Medicine)  Yanick Salazar MD as Assigned PCP    The following health maintenance items are reviewed in Epic and correct as of today:  Health Maintenance   Topic Date Due    ANNUAL REVIEW OF HM ORDERS  Never done    HEPATITIS C SCREENING  Never done    ZOSTER IMMUNIZATION (1 of 2) Never done    RSV VACCINE (Pregnancy & 60+) (1 - 1-dose 60+ series) Never done    COLORECTAL CANCER SCREENING  07/10/2023    COVID-19 Vaccine (4 - 2023-24 season) 09/01/2023    MEDICARE ANNUAL WELLNESS VISIT  10/12/2023    FALL RISK ASSESSMENT  01/04/2025    MAMMO SCREENING  11/14/2025    DTAP/TDAP/TD IMMUNIZATION (3 - Td or Tdap) 01/24/2027    ADVANCE CARE PLANNING  10/12/2027    LIPID  12/05/2028    DEXA  09/07/2032    PHQ-2 (once per calendar year)  Completed    INFLUENZA VACCINE  Completed    Pneumococcal Vaccine: 65+ Years  Completed    IPV IMMUNIZATION  Aged Out    HPV IMMUNIZATION  Aged Out    MENINGITIS IMMUNIZATION  Aged Out    RSV MONOCLONAL ANTIBODY  Aged Out     Current Outpatient  "Medications   Medication Sig Dispense Refill    amLODIPine (NORVASC) 5 MG tablet [AMLODIPINE (NORVASC) 5 MG TABLET] TAKE 1 TABLET BY MOUTH EVERY DAY 90 tablet 3    aspirin 81 MG EC tablet [ASPIRIN 81 MG EC TABLET] Take 1 tablet (81 mg total) by mouth daily.  0    atorvastatin (LIPITOR) 80 MG tablet [ATORVASTATIN (LIPITOR) 80 MG TABLET] TAKE 1 TABLET BY MOUTH EVERYDAY AT BEDTIME 90 tablet 3    calcium carbonate-vitamin D3 600 mg(1,500mg) -400 unit Chew [CALCIUM CARBONATE-VITAMIN D3 600 MG(1,500MG) -400 UNIT CHEW] Chew 1 tablet daily.             losartan (COZAAR) 100 MG tablet [LOSARTAN (COZAAR) 100 MG TABLET] TAKE 1 TABLET BY MOUTH EVERY DAY 90 tablet 1    MULTIVITAMIN ORAL [MULTIVITAMIN ORAL] Take 1 tablet by mouth daily.       No Known Allergies        Pertinent mammograms are reviewed under the imaging tab.    [unfilled]  Constitutional, HEENT, cardiovascular, pulmonary, GI, , musculoskeletal, neuro, skin, endocrine and psych systems are negative, except as otherwise noted.    OBJECTIVE:   BP (!) 158/84   Pulse 82   Temp 97.1  F (36.2  C)   Resp 16   Ht 1.675 m (5' 5.95\")   Wt 81.7 kg (180 lb 1.6 oz)   LMP  (LMP Unknown)   SpO2 98%   BMI 29.11 kg/m   Estimated body mass index is 29.11 kg/m  as calculated from the following:    Height as of this encounter: 1.675 m (5' 5.95\").    Weight as of this encounter: 81.7 kg (180 lb 1.6 oz).  Mildly overweight female.  Pupils and irises equal and reactive.  Extraocular muscles intact.  No jaundice or conjunctivitis.  External ears and nose exam is normal.  No significant cerumen impaction.  Pharynx appears normal.  Teeth in good condition.  No cervical or supraclavicular or axillary adenopathy.  No JVD and no carotid bruits.  Lungs are clear to auscultation with good respiratory excursion.  No ankle edema +1 pedal pulses and feet in good condition..  Heart is regular without murmur rub or gallop.  Abdomen is mildly overweight but nontender.  No " hepatosplenomegaly.  Liver edge is just at right at costal margin and smooth.  No pulsatile abdominal mass.  Skin exam without suspicious skin lesions.  She declined breast exam.    ASSESSMENT / PLAN:   (Z00.00) Encounter for Medicare annual wellness exam  (primary encounter diagnosis)  Comment: Patient's main health maintenance issue is risk of coronary disease and heart failure with her risk factors for coronary disease and hypertension and ongoing alcohol use.  She has mild obesity but denies sleep apnea and did not want to do a sleep evaluation.    I encouraged her to get back to a more regular exercise, improve diet and reduce alcohol.    Patient is full code.    Status post hysterectomy.    Continue yearly mammograms in November.    History of glaucoma suspect, continue to see ophthalmology regularly for eye pressure monitoring.    Grief over her dog becoming ill.  She denies depression and does not wish to consider SSRI, but I did discuss that as an option.  Patient instructions:  Contact [facility] to set up your screening colonoscopy.  If this colonoscopy does not show any polyps, you will not need to do further colonoscopy screening.    Continue your yearly mammograms in November.    See dermatology for your yearly skin cancer exam.  But I did not note any concerning skin lesions on today's exam.    Continue to see your eye doctor regularly as you are doing.    Goal blood pressure less than 135/85.  I would have you consider adding hydrochlorothiazide to your blood pressure medications to keep your blood pressure within range.  Regular exercise, low-salt diet and weight loss can also reduce your blood pressure.    Check blood pressure at least a couple times a week over the next month and bring in your blood pressure cuff to your next clinic visit with me, to discuss blood pressure management.  Continue on your current losartan and amlodipine at this time.    Waking up with a headache can sometimes be  from a very high blood pressure at night, which can be associated with sleep apnea.  Headaches can be from malpositioning of the neck muscles or from jaw clenching at night.    I would recommend you moderate alcohol and avoid more than 2 drinks in the evening.  You could consider a sleep evaluation for sleep apnea in the future.      (Z12.11) Screen for colon cancer  Comment: Proceed with colonoscopy, screening  Plan: Colonoscopy Screening  Referral            (D75.89) Macrocytosis without anemia  Comment: Consistent with her chronic alcohol use.  She was counseled to reduce alcohol.  Plan: Normal B12 level in 2021    (R53.83) Fatigue, unspecified type  Comment: Moderate suspicion for sleep apnea, but patient denies and does not wish sleep evaluation.  Plan:     (R73.9) Hyperglycemia  Comment: Blood sugar 110.  Possible prediabetes.  Improved diet and exercise.  Plan: Will plan to follow-up in the spring with fasting blood work to recheck her blood sugar and cholesterol.    (E78.00) Hypercholesterolemia  Comment: LDL cholesterol level above goal of less than 100.  But recent poor diet and change in exercise and the holidays may have affected.  Plan: Plan to recheck cholesterol levels in the spring.  Continue atorvastatin 80 mg a day.    (R93.1) Agatston coronary artery calcium score between 200 and 399  Comment: Asymptomatic.  Adequate exercise tolerance with walking.  Plan: Continues aspirin daily and atorvastatin 80 mg.    (Z85.828) History of squamous cell carcinoma of skin  Comment: No evidence of recurrence.  Patient overdue for yearly dermatology exam  Plan: Patient reminded to see dermatology    (Z23) Need for COVID-19 vaccine  Comment: Patient was warned that there are some risks with COVID-vaccine including temporary hypercoagulability, reaction to vaccines and other reactions.  I also discussed that there are risks with getting COVID if she does not get vaccinated and I would recommend that she  "proceed with vaccination as the risk of getting COVID with complications would be greater than the risk of complications with the vaccine.  Plan: Patient proceed with COVID-vaccine today.    Essential hypertension, unclear control she is not been checking it regularly but it has been elevated including 160/78 on December 5.    I recommended consideration of hydrochlorothiazide 12.5 mg a day in addition to losartan and amlodipine.  Patient wishes to check blood pressure more often and follow-up in clinic in 1 month to reevaluate.  Consider HCTZ at that time.    Mild hyperglycemia on December labs.  Improved diet and exercise.  Consider checking hemoglobin A1c to screen for diabetes in the future.    She has had chronic slightly more edema on the right leg consistent with scarring from previous surgery and possibly previous hysterectomy.  But no history of DVT and there is not been acute change.    Patient has been advised of split billing requirements and indicates understanding: Yes      COUNSELING:  Reviewed preventive health counseling, as reflected in patient instructions       Regular exercise       Healthy diet/nutrition       Vision screening      BMI:   Estimated body mass index is 29.11 kg/m  as calculated from the following:    Height as of this encounter: 1.675 m (5' 5.95\").    Weight as of this encounter: 81.7 kg (180 lb 1.6 oz).         She reports that she has never smoked. She has never been exposed to tobacco smoke. She has never used smokeless tobacco.      Appropriate preventive services were discussed with this patient, including applicable screening as appropriate for fall prevention, nutrition, physical activity, Tobacco-use cessation, weight loss and cognition.  Checklist reviewing preventive services available has been given to the patient.    Reviewed patients plan of care and provided an AVS. The Basic Care Plan (routine screening as documented in Health Maintenance) for Karen meets the Care " "Plan requirement. This Care Plan has been established and reviewed with the Patient.        Yanick Salazar MD  [facility]    Identified Health Risks:  I have reviewed Opioid Use Disorder and Substance Use Disorder risk factors and made any needed referrals. SUBJECTIVE:   Karen is a 73 year old, presenting for the following:  Wellness Visit (Mammo 11/14/23 DXA 9/07/17 Colon 7/10/13)        1/4/2024     1:16 PM   Additional Questions   Roomed by Eliza       Are you in the first 12 months of your Medicare coverage?  No    Healthy Habits:     In general, how would you rate your overall health?  Good    Frequency of exercise:  4-5 days/week    Duration of exercise:  15-30 minutes    Do you usually eat at least 4 servings of fruit and vegetables a day, include whole grains    & fiber and avoid regularly eating high fat or \"junk\" foods?  Yes    Taking medications regularly:  Yes    Ability to successfully perform activities of daily living:  No assistance needed    Home Safety:  No safety concerns identified    Hearing Impairment:  No hearing concerns    In the past 6 months, have you been bothered by leaking of urine?  No    In general, how would you rate your overall mental or emotional health?  Good    Additional concerns today:  No      Today's PHQ-2 Score:       1/4/2024     1:20 PM   PHQ-2 ( 1999 Pfizer)   Q1: Little interest or pleasure in doing things 0   Q2: Feeling down, depressed or hopeless 0   PHQ-2 Score 0   Q1: Little interest or pleasure in doing things Not at all   Q2: Feeling down, depressed or hopeless Not at all   PHQ-2 Score 0           Have you ever done Advance Care Planning? (For example, a Health Directive, POLST, or a discussion with a medical provider or your loved ones about your wishes): Full code       Fall risk  Fallen 2 or more times in the past year?: No  Any fall with injury in the past year?: No    Cognitive Screening   1) Repeat 3 items (Leader, Season, " Table)    2) Clock draw: NORMAL  3) 3 item recall: Recalls 3 objects  Results: 3 items recalled: COGNITIVE IMPAIRMENT LESS LIKELY    Mini-CogTM Copyright PATRICIA Garnett. Licensed by the author for use in [facility]; reprinted with permission ([email or web address]). All rights reserved.          Reviewed and updated as needed this visit by clinical staff   Tobacco  Allergies  Meds              Reviewed and updated as needed this visit by Provider                 Social History     Tobacco Use    Smoking status: Never     Passive exposure: Never    Smokeless tobacco: Never   Substance Use Topics    Alcohol use: Yes     Comment: Alcoholic Drinks/day: wine 2-3 glasses of wine a day             1/4/2024     1:19 PM   Alcohol Use   Prescreen: >3 drinks/day or >7 drinks/week? Yes   AUDIT SCORE  8     Do you have a current opioid prescription? No  Do you use any other controlled substances or medications that are not prescribed by a provider? Alcohol              Current providers sharing in care for this patient include:   Patient Care Team:  Yanick Salazar MD as PCP - General (Internal Medicine)  Yanick Salazar MD as Assigned PCP    The following health maintenance items are reviewed in Epic and correct as of today:  Health Maintenance   Topic Date Due    ANNUAL REVIEW OF HM ORDERS  Never done    HEPATITIS C SCREENING  Never done    ZOSTER IMMUNIZATION (1 of 2) Never done    RSV VACCINE (Pregnancy & 60+) (1 - 1-dose 60+ series) Never done    COLORECTAL CANCER SCREENING  07/10/2023    COVID-19 Vaccine (4 - 2023-24 season) 09/01/2023    MEDICARE ANNUAL WELLNESS VISIT  10/12/2023    FALL RISK ASSESSMENT  01/04/2025    MAMMO SCREENING  11/14/2025    DTAP/TDAP/TD IMMUNIZATION (3 - Td or Tdap) 01/24/2027    ADVANCE CARE PLANNING  10/12/2027    LIPID  12/05/2028    DEXA  09/07/2032    PHQ-2 (once per calendar year)  Completed    INFLUENZA VACCINE  Completed    Pneumococcal Vaccine: 65+ Years  Completed    IPV IMMUNIZATION   "Aged Out    HPV IMMUNIZATION  Aged Out    MENINGITIS IMMUNIZATION  Aged Out    RSV MONOCLONAL ANTIBODY  Aged Out             Pertinent mammograms are reviewed under the imaging tab.    Review of Systems   Constitutional:  Negative for chills and fever.   HENT:  Negative for congestion, ear pain, hearing loss and sore throat.    Eyes:  Negative for pain and visual disturbance.   Respiratory:  Negative for cough and shortness of breath.    Cardiovascular:  Negative for chest pain, palpitations and peripheral edema.   Gastrointestinal:  Negative for abdominal pain, constipation, diarrhea, heartburn, hematochezia and nausea.   Breasts:  Negative for discharge.   Genitourinary:  Negative for dysuria, frequency, genital sores, hematuria, pelvic pain, urgency, vaginal bleeding and vaginal discharge.   Musculoskeletal:  Negative for arthralgias, joint swelling and myalgias.   Skin:  Negative for rash.   Neurological:  Negative for dizziness, weakness, headaches and paresthesias.   Psychiatric/Behavioral:  Negative for mood changes. The patient is not nervous/anxious.          OBJECTIVE:   BP (!) 158/84   Pulse 82   Temp 97.1  F (36.2  C)   Resp 16   Ht 1.675 m (5' 5.95\")   Wt 81.7 kg (180 lb 1.6 oz)   LMP  (LMP Unknown)   SpO2 98%   BMI 29.11 kg/m   Estimated body mass index is 29.11 kg/m  as calculated from the following:    Height as of this encounter: 1.675 m (5' 5.95\").    Weight as of this encounter: 81.7 kg (180 lb 1.6 oz).  Physical Exam          ASSESSMENT / PLAN:   See documentation above      BMI:   Estimated body mass index is 29.11 kg/m  as calculated from the following:    Height as of this encounter: 1.675 m (5' 5.95\").    Weight as of this encounter: 81.7 kg (180 lb 1.6 oz).         She reports that she has never smoked. She has never been exposed to tobacco smoke. She has never used smokeless tobacco.      Appropriate preventive services were discussed with this patient, including applicable " screening as appropriate for fall prevention, nutrition, physical activity, Tobacco-use cessation, weight loss and cognition.  Checklist reviewing preventive services available has been given to the patient.    Reviewed patients plan of care and provided an AVS. The Basic Care Plan (routine screening as documented in Health Maintenance) for Karen meets the Care Plan requirement. This Care Plan has been established and reviewed with the Patient.          Yanick Salazar MD  [facility]    Identified Health Risks:  I have reviewed Opioid Use Disorder and Substance Use Disorder risk factors and made any needed referrals.

## 2024-01-05 RX ORDER — AMLODIPINE BESYLATE 5 MG/1
TABLET ORAL
Qty: 90 TABLET | Refills: 3 | Status: SHIPPED | OUTPATIENT
Start: 2024-01-05 | End: 2024-07-31

## 2024-01-16 ENCOUNTER — LAB REQUISITION (OUTPATIENT)
Dept: LAB | Facility: CLINIC | Age: 74
End: 2024-01-16
Payer: MEDICARE

## 2024-01-16 DIAGNOSIS — Z12.4 ENCOUNTER FOR SCREENING FOR MALIGNANT NEOPLASM OF CERVIX: ICD-10-CM

## 2024-01-16 PROCEDURE — G0145 SCR C/V CYTO,THINLAYER,RESCR: HCPCS | Mod: ORL | Performed by: OBSTETRICS & GYNECOLOGY

## 2024-01-16 PROCEDURE — 87624 HPV HI-RISK TYP POOLED RSLT: CPT | Mod: ORL | Performed by: OBSTETRICS & GYNECOLOGY

## 2024-01-17 ENCOUNTER — MEDICAL CORRESPONDENCE (OUTPATIENT)
Dept: SCHEDULING | Facility: CLINIC | Age: 74
End: 2024-01-17
Payer: MEDICARE

## 2024-01-19 LAB
BKR LAB AP GYN ADEQUACY: ABNORMAL
BKR LAB AP GYN INTERPRETATION: ABNORMAL
BKR LAB AP HPV REFLEX: ABNORMAL
BKR LAB AP LMP: ABNORMAL
BKR LAB AP PREVIOUS ABNL DX: ABNORMAL
BKR LAB AP PREVIOUS ABNORMAL: ABNORMAL
PATH REPORT.COMMENTS IMP SPEC: ABNORMAL
PATH REPORT.COMMENTS IMP SPEC: ABNORMAL
PATH REPORT.RELEVANT HX SPEC: ABNORMAL

## 2024-01-19 PROCEDURE — 88141 CYTOPATH C/V INTERPRET: CPT | Performed by: PATHOLOGY

## 2024-01-22 LAB
HUMAN PAPILLOMA VIRUS 16 DNA: NEGATIVE
HUMAN PAPILLOMA VIRUS 18 DNA: NEGATIVE
HUMAN PAPILLOMA VIRUS FINAL DIAGNOSIS: NORMAL
HUMAN PAPILLOMA VIRUS OTHER HR: NEGATIVE

## 2024-02-07 ENCOUNTER — ANCILLARY PROCEDURE (OUTPATIENT)
Dept: BONE DENSITY | Facility: CLINIC | Age: 74
End: 2024-02-07
Attending: OBSTETRICS & GYNECOLOGY
Payer: MEDICARE

## 2024-02-07 DIAGNOSIS — Z78.0 POSTMENOPAUSAL STATUS: ICD-10-CM

## 2024-02-07 PROCEDURE — 77080 DXA BONE DENSITY AXIAL: CPT | Mod: TC | Performed by: PHYSICIAN ASSISTANT

## 2024-02-16 DIAGNOSIS — I10 ESSENTIAL HYPERTENSION: ICD-10-CM

## 2024-02-16 RX ORDER — LOSARTAN POTASSIUM 100 MG/1
TABLET ORAL
Qty: 90 TABLET | Refills: 1 | Status: SHIPPED | OUTPATIENT
Start: 2024-02-16 | End: 2024-08-19

## 2024-03-28 ENCOUNTER — TRANSFERRED RECORDS (OUTPATIENT)
Dept: HEALTH INFORMATION MANAGEMENT | Facility: CLINIC | Age: 74
End: 2024-03-28
Payer: MEDICARE

## 2024-04-01 DIAGNOSIS — E78.00 PURE HYPERCHOLESTEROLEMIA: ICD-10-CM

## 2024-04-01 RX ORDER — ATORVASTATIN CALCIUM 80 MG/1
TABLET, FILM COATED ORAL
Qty: 90 TABLET | Refills: 2 | Status: SHIPPED | OUTPATIENT
Start: 2024-04-01

## 2024-04-24 ENCOUNTER — OFFICE VISIT (OUTPATIENT)
Dept: INTERNAL MEDICINE | Facility: CLINIC | Age: 74
End: 2024-04-24
Payer: MEDICARE

## 2024-04-24 VITALS
RESPIRATION RATE: 18 BRPM | DIASTOLIC BLOOD PRESSURE: 80 MMHG | HEIGHT: 66 IN | BODY MASS INDEX: 28.93 KG/M2 | TEMPERATURE: 97.5 F | OXYGEN SATURATION: 97 % | SYSTOLIC BLOOD PRESSURE: 160 MMHG | HEART RATE: 64 BPM | WEIGHT: 180 LBS

## 2024-04-24 DIAGNOSIS — Z85.828 HISTORY OF SQUAMOUS CELL CARCINOMA OF SKIN: ICD-10-CM

## 2024-04-24 DIAGNOSIS — Z51.81 ENCOUNTER FOR THERAPEUTIC DRUG MONITORING: ICD-10-CM

## 2024-04-24 DIAGNOSIS — I10 ESSENTIAL HYPERTENSION: Primary | ICD-10-CM

## 2024-04-24 DIAGNOSIS — E78.00 HYPERCHOLESTEROLEMIA: ICD-10-CM

## 2024-04-24 DIAGNOSIS — R73.9 HYPERGLYCEMIA: ICD-10-CM

## 2024-04-24 PROCEDURE — 99214 OFFICE O/P EST MOD 30 MIN: CPT | Performed by: INTERNAL MEDICINE

## 2024-04-24 PROCEDURE — G2211 COMPLEX E/M VISIT ADD ON: HCPCS | Performed by: INTERNAL MEDICINE

## 2024-04-24 RX ORDER — HYDROCHLOROTHIAZIDE 12.5 MG/1
12.5 TABLET ORAL DAILY
Qty: 90 TABLET | Refills: 3 | Status: SHIPPED | OUTPATIENT
Start: 2024-04-24

## 2024-04-24 RX ORDER — RESPIRATORY SYNCYTIAL VIRUS VACCINE 120MCG/0.5
0.5 KIT INTRAMUSCULAR ONCE
Qty: 1 EACH | Refills: 0 | Status: CANCELLED | OUTPATIENT
Start: 2024-04-24 | End: 2024-04-24

## 2024-04-24 NOTE — PATIENT INSTRUCTIONS
Add hydrochlorothiazide 12.5 mg a day in addition to your other blood pressure medications.    Goal blood pressure less than 135/85 but not less than 110/60.    See me in 2 weeks for blood pressure reevaluation, nonfasting visit.    Continue to walk on a daily basis.  Continue to avoid salty foods.    I would continue to recommend you limit alcohol to 1 glass or less a day.

## 2024-04-24 NOTE — PROGRESS NOTES
Karen Li   73 year old female    Date of Visit: 4/24/2024    Chief Complaint   Patient presents with    Follow Up     3 mo BP check     Subjective  Here for follow-up on hypertension.  In January blood pressure was 158/84.  Goal is to lose weight and increase regular exercise.    Her elderly dog did die recently, but she has been out walking 30 minutes a day and feels she has been doing good on exercise.    She has not checked her blood pressure on her own.  She denies lightheaded dizzy spells.    Still on losartan 100 mg a day and amlodipine 5 mg a day.    Blood pressure was high at recent colonoscopy last month.    Colonoscopy was negative in March, no further colonoscopies planned.    She does drink some wine in the evening, but does not feel it is an issue and does not want to cut down.  She denies depression.    She did have a borderline blood sugar of 110 previously.  Has not been diagnosed with diabetes.    Does continue on atorvastatin 80 mg with LDL cholesterol level 123 last December.  She has a family history of coronary disease with her father.  2019 cardiac CT scan calcium score of 255 but she has not had an event.  No chest pain.  2019 carotid ultrasound with mild plaque but no stenosis.    She has a history of glaucoma suspect, was seen just last month by ophthalmology and has not progressed.  She saw dermatology also last month and no new skin lesions, given a 6-month follow-up for both of those.    November 2023 mammogram was negative and hysterectomy in 1992.    History of severe headache October 2024 but no recent headache complaints.    PMHx:  No past medical history on file.  PSHx:    Past Surgical History:   Procedure Laterality Date    ANKLE SURGERY Left 2002    CATARACT EXTRACTION      HYSTERECTOMY      HYSTERECTOMY TOTAL ABDOMINAL, BILATERAL SALPINGO-OOPHORECTOMY, COMBINED Bilateral 1992     Immunizations:   Immunization History   Administered Date(s) Administered    COVID-19 12+  "(2023-24) (Pfizer) 01/04/2024    COVID-19 Bivalent 12+ (Pfizer) 10/12/2022    COVID-19 MONOVALENT 12+ (Pfizer) 11/02/2021    COVID-19 Vaccine ([place]) 04/09/2021    Flu, Unspecified 10/25/2007    Influenza (High Dose) 3 valent vaccine 09/24/2015, 01/24/2017, 02/01/2018, 09/11/2018, 11/19/2019    Influenza (IIV3) PF 10/25/2007, 01/27/2009    Influenza Vaccine 65+ (Fluzone HD) 01/05/2021, 10/12/2022, 12/05/2023    Influenza Vaccine, 6+MO IM (QUADRIVALENT W/PRESERVATIVES) 01/27/2009, 12/08/2009    Influenza, seasonal, injectable, PF 12/08/2009, 03/15/2011, 09/05/2012    Pneumo Conj 13-V (2010&after) 09/24/2015    Pneumococcal 23 valent 01/24/2017    Rabies Immune Globulin 07/12/2013    Rabies Vaccine 07/12/2013, 07/15/2013, 07/19/2013, 07/26/2013    Rabies, Unspecified 07/12/2013, 07/15/2013, 07/19/2013, 07/26/2013    TDAP (Adacel,Boostrix) 07/02/2007, 01/24/2017    Td (Adult), Adsorbed 08/05/1994       ROS A comprehensive review of systems was performed and was otherwise negative    Medications, allergies, and problem list were reviewed and updated    Exam  BP (!) 160/80   Pulse 64   Temp 97.5  F (36.4  C)   Resp 18   Ht 1.674 m (5' 5.9\")   Wt 81.6 kg (180 lb)   LMP  (LMP Unknown)   SpO2 97%   BMI 29.14 kg/m    She appears well.  Mobility is normal.  Lungs are clear.  Heart is regular without murmur.  No edema    Assessment/Plan  1. Essential hypertension  Not controlled.  Add HCTZ.  I discussed risk of affecting kidney function, potassium, dehydration risk.    Continue losartan and amlodipine which she also takes in the morning.  I did discuss that amlodipine could be changed to the evening if she has some lightheaded dizzy spells later in the morning after taking all her pills together.    Follow-up in 2 weeks to recheck with labs planned at that time.  - hydroCHLOROthiazide 12.5 MG tablet; Take 1 tablet (12.5 mg) by mouth daily  Dispense: 90 tablet; Refill: 3    2. Hypercholesterolemia  Goal LDL less than " 100, but on max Lipitor 80 mg.  Not planning further adjustment.    3. Encounter for therapeutic drug monitoring  Patient will wait and do labs at her follow-up appointment    4. Hyperglycemia  Plan hemoglobin A1c checked to screen for diabetes at next visit    5. History of squamous cell carcinoma of skin  Was seen by dermatology in March with a 6-month follow-up planned.    The longitudinal plan of care for the diagnosis(es)/condition(s) as documented were addressed during this visit. Due to the added complexity in care, I will continue to support Karen in the subsequent management and with ongoing continuity of care.        Return in 20 days (on 5/14/2024) for Blood pressure follow-up appointment.   Patient Instructions   Add hydrochlorothiazide 12.5 mg a day in addition to your other blood pressure medications.    Goal blood pressure less than 135/85 but not less than 110/60.    See me in 2 weeks for blood pressure reevaluation, nonfasting visit.    Continue to walk on a daily basis.  Continue to avoid salty foods.    I would continue to recommend you limit alcohol to 1 glass or less a day.        Yanick Salazar MD, MD        Current Outpatient Medications   Medication Sig Dispense Refill    amLODIPine (NORVASC) 5 MG tablet [AMLODIPINE (NORVASC) 5 MG TABLET] TAKE 1 TABLET BY MOUTH EVERY DAY 90 tablet 3    aspirin 81 MG EC tablet [ASPIRIN 81 MG EC TABLET] Take 1 tablet (81 mg total) by mouth daily.  0    atorvastatin (LIPITOR) 80 MG tablet [ATORVASTATIN (LIPITOR) 80 MG TABLET] TAKE 1 TABLET BY MOUTH EVERYDAY AT BEDTIME 90 tablet 2    calcium carbonate-vitamin D3 600 mg(1,500mg) -400 unit Chew [CALCIUM CARBONATE-VITAMIN D3 600 MG(1,500MG) -400 UNIT CHEW] Chew 1 tablet daily.             hydroCHLOROthiazide 12.5 MG tablet Take 1 tablet (12.5 mg) by mouth daily 90 tablet 3    losartan (COZAAR) 100 MG tablet [LOSARTAN (COZAAR) 100 MG TABLET] TAKE 1 TABLET BY MOUTH EVERY DAY 90 tablet 1    MULTIVITAMIN ORAL  [MULTIVITAMIN ORAL] Take 1 tablet by mouth daily.       No Known Allergies  Social History     Tobacco Use    Smoking status: Never     Passive exposure: Never    Smokeless tobacco: Never   Vaping Use    Vaping status: Never Used   Substance Use Topics    Alcohol use: Yes     Comment: Alcoholic Drinks/day: wine 2-3 glasses of wine a day    Drug use: No             Subjective   Karen is a 73 year old, presenting for the following health issues:  Follow Up (3 mo BP check)      4/24/2024    11:18 AM   Additional Questions   Roomed by Sofía BURDICK   Accompanied by zaira     History of Present Illness       Hypertension: She presents for follow up of hypertension.  She does check blood pressure  regularly outside of the clinic. Outpatient blood pressures have not been over 140/90. She follows a low salt diet.     She eats 0-1 servings of fruits and vegetables daily.She consumes 0 sweetened beverage(s) daily.She exercises with enough effort to increase her heart rate 9 or less minutes per day.  She exercises with enough effort to increase her heart rate 3 or less days per week.   She is taking medications regularly.                     Objective    LMP  (LMP Unknown)   There is no height or weight on file to calculate BMI.  Physical Exam               Signed Electronically by: Yanick Salazar MD

## 2024-05-14 ENCOUNTER — OFFICE VISIT (OUTPATIENT)
Dept: INTERNAL MEDICINE | Facility: CLINIC | Age: 74
End: 2024-05-14
Payer: MEDICARE

## 2024-05-14 VITALS
SYSTOLIC BLOOD PRESSURE: 137 MMHG | OXYGEN SATURATION: 98 % | HEIGHT: 66 IN | HEART RATE: 57 BPM | BODY MASS INDEX: 28.77 KG/M2 | WEIGHT: 179 LBS | RESPIRATION RATE: 18 BRPM | TEMPERATURE: 97.5 F | DIASTOLIC BLOOD PRESSURE: 78 MMHG

## 2024-05-14 DIAGNOSIS — R53.83 FATIGUE, UNSPECIFIED TYPE: ICD-10-CM

## 2024-05-14 DIAGNOSIS — R73.03 PREDIABETES: ICD-10-CM

## 2024-05-14 DIAGNOSIS — I49.9 IRREGULAR HEART RATE: ICD-10-CM

## 2024-05-14 DIAGNOSIS — Z51.81 ENCOUNTER FOR THERAPEUTIC DRUG MONITORING: ICD-10-CM

## 2024-05-14 DIAGNOSIS — I10 ESSENTIAL HYPERTENSION: ICD-10-CM

## 2024-05-14 DIAGNOSIS — E78.00 HYPERCHOLESTEROLEMIA: ICD-10-CM

## 2024-05-14 DIAGNOSIS — I48.91 ATRIAL FIBRILLATION, UNSPECIFIED TYPE (H): Primary | ICD-10-CM

## 2024-05-14 LAB
ALBUMIN SERPL BCG-MCNC: 4.6 G/DL (ref 3.5–5.2)
ALP SERPL-CCNC: 59 U/L (ref 40–150)
ALT SERPL W P-5'-P-CCNC: 23 U/L (ref 0–50)
ANION GAP SERPL CALCULATED.3IONS-SCNC: 12 MMOL/L (ref 7–15)
AST SERPL W P-5'-P-CCNC: 31 U/L (ref 0–45)
ATRIAL RATE - MUSE: 138 BPM
BILIRUB SERPL-MCNC: 0.4 MG/DL
BUN SERPL-MCNC: 25.9 MG/DL (ref 8–23)
CALCIUM SERPL-MCNC: 9.9 MG/DL (ref 8.8–10.2)
CHLORIDE SERPL-SCNC: 99 MMOL/L (ref 98–107)
CREAT SERPL-MCNC: 1.01 MG/DL (ref 0.51–0.95)
DEPRECATED HCO3 PLAS-SCNC: 27 MMOL/L (ref 22–29)
DIASTOLIC BLOOD PRESSURE - MUSE: NORMAL MMHG
EGFRCR SERPLBLD CKD-EPI 2021: 57 ML/MIN/1.73M2
ERYTHROCYTE [DISTWIDTH] IN BLOOD BY AUTOMATED COUNT: 12.7 % (ref 10–15)
GLUCOSE SERPL-MCNC: 110 MG/DL (ref 70–99)
HBA1C MFR BLD: 6.4 % (ref 0–5.6)
HCT VFR BLD AUTO: 38.6 % (ref 35–47)
HGB BLD-MCNC: 12.8 G/DL (ref 11.7–15.7)
INTERPRETATION ECG - MUSE: NORMAL
LDLC SERPL DIRECT ASSAY-MCNC: 107 MG/DL
MCH RBC QN AUTO: 34.7 PG (ref 26.5–33)
MCHC RBC AUTO-ENTMCNC: 33.2 G/DL (ref 31.5–36.5)
MCV RBC AUTO: 105 FL (ref 78–100)
P AXIS - MUSE: NORMAL DEGREES
PLATELET # BLD AUTO: 263 10E3/UL (ref 150–450)
POTASSIUM SERPL-SCNC: 3.9 MMOL/L (ref 3.4–5.3)
PR INTERVAL - MUSE: NORMAL MS
PROT SERPL-MCNC: 7.6 G/DL (ref 6.4–8.3)
QRS DURATION - MUSE: 88 MS
QT - MUSE: 326 MS
QTC - MUSE: 456 MS
R AXIS - MUSE: 26 DEGREES
RBC # BLD AUTO: 3.69 10E6/UL (ref 3.8–5.2)
SODIUM SERPL-SCNC: 138 MMOL/L (ref 135–145)
SYSTOLIC BLOOD PRESSURE - MUSE: NORMAL MMHG
T AXIS - MUSE: 118 DEGREES
TSH SERPL DL<=0.005 MIU/L-ACNC: 2.33 UIU/ML (ref 0.3–4.2)
VENTRICULAR RATE- MUSE: 118 BPM
WBC # BLD AUTO: 7.7 10E3/UL (ref 4–11)

## 2024-05-14 PROCEDURE — 36415 COLL VENOUS BLD VENIPUNCTURE: CPT | Performed by: INTERNAL MEDICINE

## 2024-05-14 PROCEDURE — 83036 HEMOGLOBIN GLYCOSYLATED A1C: CPT | Performed by: INTERNAL MEDICINE

## 2024-05-14 PROCEDURE — 93010 ELECTROCARDIOGRAM REPORT: CPT | Mod: OFF | Performed by: INTERNAL MEDICINE

## 2024-05-14 PROCEDURE — 80053 COMPREHEN METABOLIC PANEL: CPT | Performed by: INTERNAL MEDICINE

## 2024-05-14 PROCEDURE — 84443 ASSAY THYROID STIM HORMONE: CPT | Performed by: INTERNAL MEDICINE

## 2024-05-14 PROCEDURE — 83721 ASSAY OF BLOOD LIPOPROTEIN: CPT | Performed by: INTERNAL MEDICINE

## 2024-05-14 PROCEDURE — G2211 COMPLEX E/M VISIT ADD ON: HCPCS | Performed by: INTERNAL MEDICINE

## 2024-05-14 PROCEDURE — 93005 ELECTROCARDIOGRAM TRACING: CPT | Performed by: INTERNAL MEDICINE

## 2024-05-14 PROCEDURE — 99215 OFFICE O/P EST HI 40 MIN: CPT | Performed by: INTERNAL MEDICINE

## 2024-05-14 PROCEDURE — 85027 COMPLETE CBC AUTOMATED: CPT | Performed by: INTERNAL MEDICINE

## 2024-05-14 RX ORDER — RESPIRATORY SYNCYTIAL VIRUS VACCINE 120MCG/0.5
0.5 KIT INTRAMUSCULAR ONCE
Qty: 1 EACH | Refills: 0 | Status: CANCELLED | OUTPATIENT
Start: 2024-05-14 | End: 2024-05-14

## 2024-05-14 RX ORDER — METOPROLOL SUCCINATE 25 MG/1
25 TABLET, EXTENDED RELEASE ORAL DAILY
Qty: 90 TABLET | Refills: 3 | Status: SHIPPED | OUTPATIENT
Start: 2024-05-14 | End: 2024-09-24 | Stop reason: DRUGHIGH

## 2024-05-14 NOTE — LETTER
May 15, 2024      Karen Li  [address] [place] [address]        Dear ,    We are writing to inform you of your test results.    Potassium level is normal.  Mildly elevated urea nitrogen and creatinine levels, likely represents some level of dehydration or effects of the atrial fibrillation on the day of your visit.  No change in medications, and I will recheck your labs at next visit.    Liver tests are normal.    Blood sugar/glucose of 110 is borderline high.  Hemoglobin A1c level of 6.4% still puts you in the prediabetes range.    LDL cholesterol level is mildly high.  Continue on atorvastatin 80 mg a day at this time.  Make sure you are taking atorvastatin every day.    Thyroid test is normal.    Hemoglobin level is normal    Resulted Orders   Comprehensive metabolic panel   Result Value Ref Range    Sodium 138 135 - 145 mmol/L      Comment:      Reference intervals for this test were updated on 09/26/2023 to more accurately reflect our healthy population. There may be differences in the flagging of prior results with similar values performed with this method. Interpretation of those prior results can be made in the context of the updated reference intervals.     Potassium 3.9 3.4 - 5.3 mmol/L    Carbon Dioxide (CO2) 27 22 - 29 mmol/L    Anion Gap 12 7 - 15 mmol/L    Urea Nitrogen 25.9 (H) 8.0 - 23.0 mg/dL    Creatinine 1.01 (H) 0.51 - 0.95 mg/dL    GFR Estimate 57 (L) >60 mL/min/1.73m2    Calcium 9.9 8.8 - 10.2 mg/dL    Chloride 99 98 - 107 mmol/L    Glucose 110 (H) 70 - 99 mg/dL    Alkaline Phosphatase 59 40 - 150 U/L      Comment:      Reference intervals for this test were updated on 11/14/2023 to more accurately reflect our healthy population. There may be differences in the flagging of prior results with similar values performed with this method. Interpretation of those prior results can be made in the context of the updated reference intervals.    AST 31 0 - 45 U/L      Comment:      Reference  intervals for this test were updated on 6/12/2023 to more accurately reflect our healthy population. There may be differences in the flagging of prior results with similar values performed with this method. Interpretation of those prior results can be made in the context of the updated reference intervals.    ALT 23 0 - 50 U/L      Comment:      Reference intervals for this test were updated on 6/12/2023 to more accurately reflect our healthy population. There may be differences in the flagging of prior results with similar values performed with this method. Interpretation of those prior results can be made in the context of the updated reference intervals.      Protein Total 7.6 6.4 - 8.3 g/dL    Albumin 4.6 3.5 - 5.2 g/dL    Bilirubin Total 0.4 <=1.2 mg/dL   LDL cholesterol direct   Result Value Ref Range    LDL Cholesterol Direct 107 (H) <100 mg/dL      Comment:      Age 2-19 years:  Desirable: 0-110 mg/dL   Borderline high: 110-129 mg/dL   High: >= 130 mg/dL    Age 20 years and older:  Desirable: <100mg/dL  Above desirable: 100-129 mg/dL   Borderline high: 130-159 mg/dL   High: 160-189 mg/dL   Very high: >= 190 mg/dL   Hemoglobin A1c   Result Value Ref Range    Hemoglobin A1C 6.4 (H) 0.0 - 5.6 %      Comment:      Normal <5.7%   Prediabetes 5.7-6.4%    Diabetes 6.5% or higher     Note: Adopted from ADA consensus guidelines.   TSH with free T4 reflex   Result Value Ref Range    TSH 2.33 0.30 - 4.20 uIU/mL   CBC with platelets   Result Value Ref Range    WBC Count 7.7 4.0 - 11.0 10e3/uL    RBC Count 3.69 (L) 3.80 - 5.20 10e6/uL    Hemoglobin 12.8 11.7 - 15.7 g/dL    Hematocrit 38.6 35.0 - 47.0 %     (H) 78 - 100 fL    MCH 34.7 (H) 26.5 - 33.0 pg    MCHC 33.2 31.5 - 36.5 g/dL    RDW 12.7 10.0 - 15.0 %    Platelet Count 263 150 - 450 10e3/uL       If you have any questions or concerns, please call the clinic at the number listed above.       Sincerely,      Yanick Salazar MD

## 2024-05-14 NOTE — PROGRESS NOTES
Karen Li   74 year old female    Date of Visit: 5/14/2024    Chief Complaint   Patient presents with    Follow Up     BP check     Subjective  74-year-old female was scheduled for a blood pressure follow-up appointment, but she has had an acute illness and change in her status.    April 24 she was seen in clinic with a blood pressure of 160/80.  She was given hydrochlorothiazide 12.5 mg a day to start which she did.    She continued on her losartan 100 mg a day and amlodipine 5 mg a day.    4 days after that clinic visit she started feeling ill with chills, feeling very sick and weak.  Increased urinary frequency and some mild bilateral lower abdominal bloating discomfort but not severe.  She did not test for COVID.  She has had a previous COVID vaccination in January.  She did not have a significant cough or respiratory symptoms.  She did not document a fever.  He describes significant weakness, did not do her regular walking exercise.  She denies significant increase in shortness of breath, more profound weakness.  She denies any chest pain or chest pressure.    Her symptoms improved last week and completely resolved 2 days ago.  She feels back to normal now.    She denies palpitations or feeling like a racing heart rate.  No increased edema.    She has not had a history of atrial fibrillation.    She sleeps 9 hours a night and denies significant daytime sleepiness.  She is moderately overweight.    She does drink wine 1 to 2 glasses every evening.    She does have family history of coronary disease with father.  2019 cardiac CT scan showed a calcium score of 255 but has not had an event.  2019 carotid ultrasound showed some mild plaque.  He is on aspirin and atorvastatin 80 mg.    He is never smoked.    Prediabetes with blood sugar 110 in December.  Does not check blood sugars.    No rash or lower extremity edema.  No history of stroke.    PMHx:  No past medical history on file.  PSHx:    Past Surgical  "History:   Procedure Laterality Date    ANKLE SURGERY Left 2002    CATARACT EXTRACTION      HYSTERECTOMY      HYSTERECTOMY TOTAL ABDOMINAL, BILATERAL SALPINGO-OOPHORECTOMY, COMBINED Bilateral 1992     Immunizations:   Immunization History   Administered Date(s) Administered    COVID-19 12+ (2023-24) (Pfizer) 01/04/2024    COVID-19 Bivalent 12+ (Pfizer) 10/12/2022    COVID-19 MONOVALENT 12+ (Pfizer) 11/02/2021    COVID-19 Vaccine ([place]) 04/09/2021    Flu, Unspecified 10/25/2007    Influenza (High Dose) 3 valent vaccine 09/24/2015, 01/24/2017, 02/01/2018, 09/11/2018, 11/19/2019    Influenza (IIV3) PF 10/25/2007, 01/27/2009    Influenza Vaccine 65+ (Fluzone HD) 01/05/2021, 10/12/2022, 12/05/2023    Influenza Vaccine, 6+MO IM (QUADRIVALENT W/PRESERVATIVES) 01/27/2009, 12/08/2009    Influenza, seasonal, injectable, PF 12/08/2009, 03/15/2011, 09/05/2012    Pneumo Conj 13-V (2010&after) 09/24/2015    Pneumococcal 23 valent 01/24/2017    Rabies Immune Globulin 07/12/2013    Rabies Vaccine 07/12/2013, 07/15/2013, 07/19/2013, 07/26/2013    Rabies, Unspecified 07/12/2013, 07/15/2013, 07/19/2013, 07/26/2013    TDAP (Adacel,Boostrix) 07/02/2007, 01/24/2017    Td (Adult), Adsorbed 08/05/1994       ROS A comprehensive review of systems was performed and was otherwise negative    Medications, allergies, and problem list were reviewed and updated    Exam  /78   Pulse 57   Temp 97.5  F (36.4  C)   Resp 18   Ht 1.674 m (5' 5.9\")   Wt 81.2 kg (179 lb)   LMP  (LMP Unknown)   SpO2 98%   BMI 28.98 kg/m    Mildly overweight.  Mobility is normal and normal neurologic exam.  Mentation appears normal.  Pupils and irises equal and reactive.  No jaundice.  No pharyngitis.  Teeth in good condition.  No thrush.  No cervical or supraclavicular adenopathy.  Lungs are clear to auscultation with good respiratory excursion.  Heart is irregularly irregular, rapid heart rate.  I did not hear a murmur rub or gallop.  Abdomen is " nontender, just mildly overweight.  No left lower quadrant tenderness to palpation.  There is no ankle edema.    Assessment/Plan  1. Atrial fibrillation, unspecified type (H)  New onset atrial fibrillation with rapid ventricular rate but tolerating adequately currently.    I suspect she had a viral illness 2 weeks ago, possibly COVID.  She developed that approximately 4 days after being in clinic with me, and is now fully recovered.  She describes the profound weakness as prominent.  But she did not have prominent respiratory symptoms.    Moderate suspicion for underlying sleep apnea, she does use chronic alcohol.  But she does not feel she has sleep apnea and did not want to good refer to the sleep clinic.  I encouraged her to consider that in the future, however.    Patient was told to abstain from alcohol.    She denies chest pain and she feels back to normal at this time.  EKG showed some lateral ST changes but no Q waves.  Refer to cardiology, and patient may benefit from a cardiac stress test given her moderate cardiovascular risk factors and history of coronary calcium.    Rapid heart rate, add metoprolol 25 mg a day.  Patient was warned about low blood pressure and low heart rate risk.  I encouraged her to check her blood pressure on her own and then see me next week in clinic.    Event monitor for monitoring atrial fibrillation and heart rate.    Cardiac echo.    I did discuss risk of bleeding with the Eliquis blood thinner.  I discussed risk of stroke without blood thinner.  Patient accepts risk of blood thinner and wishes to proceed.  She was told to stop her aspirin.  She was told to go to the emergency room immediately if she has bleeding that does not stop if she has significant head trauma.      - Adult Cardiology [facility] Referral; Future  - Echocardiogram Complete; Future  - Adult Cardiac Event Monitor; Future  - metoprolol succinate ER (TOPROL XL) 25 MG 24 hr tablet; Take 1 tablet (25 mg) by  mouth daily  Dispense: 90 tablet; Refill: 3  - apixaban ANTICOAGULANT (ELIQUIS) 5 MG tablet; Take 1 tablet (5 mg) by mouth 2 times daily  Dispense: 60 tablet; Refill: 5    2. Essential hypertension  Now well-controlled on hydrochlorothiazide.  Above events would not be consistent with a side effect of hydrochlorothiazide.  Continue 12.5 mg of hydrochlorothiazide in addition to losartan and amlodipine.    3. Prediabetes  Diet controlled  - Hemoglobin A1c    4. Hypercholesterolemia  Goal LDL less than 100.  Continue atorvastatin 80 mg.  - LDL cholesterol direct    5. Irregular heart rate  New onset atrial fibrillation confirmed, EKG read by me today and shared with patient  - EKG 12-lead, tracing only    6. Fatigue, unspecified type  Severe fatigue 2 weeks ago I suspect is a viral illness that is now passed.  She denies significant daytime sleepiness at this time but moderate suspicion for sleep apnea.    Rule out thyroid abnormality with her atrial fibrillation  - TSH with free T4 reflex    7. Encounter for therapeutic drug monitoring    - Comprehensive metabolic panel  - CBC with platelets    History of squamous cell cancer, saw dermatology in March and given a 6-month follow-up.    Glaucoma suspect and was seen by ophthalmology in March    Colonoscopy March 2024 was negative and no further planned with age.    Time with patient was greater than 40 minutes, with extensive discussion atrial fibrillation, risks of medications and planned workup.      Return in 10 days (on 5/24/2024) for Clinic follow-up appointment, nonfasting.   Patient Instructions   You have new onset atrial fibrillation, likely caused from the illness that you had over the past 2 weeks.    I would not suspect that hydrochlorothiazide caused any of your symptoms.  I suspect you had a viral illness which is now passed.    What your illness has caused to strain on your heart and lead to atrial fibrillation.  Atrial fibrillation can also be from high  blood pressure, sleep apnea, alcohol and heart ischemia.    Consider an evaluation for sleep apnea through the sleep clinic in the future.    Abstain from alcohol.  Do not drink any more alcohol.    Proceed with heart evaluation with heart echo and heart monitor.  Make an appointment with cardiology clinic to discuss long-term plan for your atrial fibrillation.    See me next week for follow-up on this condition.    Start Eliquis blood thinner to reduce chance of stroke.  Stop your aspirin.    Start metoprolol once a day to keep your heart rate slower with the atrial fibrillation.    Continue the losartan, amlodipine and hydrochlorothiazide at current dose.    If you develop chest pain, feeling faint or like you are going to pass out, or significant increasing shortness of breath, go to the emergency room for immediate evaluation.    Yanick Salazar MD, MD        Current Outpatient Medications   Medication Sig Dispense Refill    amLODIPine (NORVASC) 5 MG tablet [AMLODIPINE (NORVASC) 5 MG TABLET] TAKE 1 TABLET BY MOUTH EVERY DAY 90 tablet 3    apixaban ANTICOAGULANT (ELIQUIS) 5 MG tablet Take 1 tablet (5 mg) by mouth 2 times daily 60 tablet 5    atorvastatin (LIPITOR) 80 MG tablet [ATORVASTATIN (LIPITOR) 80 MG TABLET] TAKE 1 TABLET BY MOUTH EVERYDAY AT BEDTIME 90 tablet 2    calcium carbonate-vitamin D3 600 mg(1,500mg) -400 unit Chew [CALCIUM CARBONATE-VITAMIN D3 600 MG(1,500MG) -400 UNIT CHEW] Chew 1 tablet daily.             hydroCHLOROthiazide 12.5 MG tablet Take 1 tablet (12.5 mg) by mouth daily 90 tablet 3    losartan (COZAAR) 100 MG tablet [LOSARTAN (COZAAR) 100 MG TABLET] TAKE 1 TABLET BY MOUTH EVERY DAY 90 tablet 1    metoprolol succinate ER (TOPROL XL) 25 MG 24 hr tablet Take 1 tablet (25 mg) by mouth daily 90 tablet 3    MULTIVITAMIN ORAL [MULTIVITAMIN ORAL] Take 1 tablet by mouth daily.       No Known Allergies  Social History     Tobacco Use    Smoking status: Never     Passive exposure: Never    Smokeless  "tobacco: Never   Vaping Use    Vaping status: Never Used   Substance Use Topics    Alcohol use: Yes     Comment: Alcoholic Drinks/day: wine 2-3 glasses of wine a day    Drug use: No             Subjective   Karen is a 74 year old, presenting for the following health issues:  Follow Up (BP check)      5/14/2024    11:13 AM   Additional Questions   Roomed by Sofía BURDICK   Accompanied by zaira     History of Present Illness       Hypertension: She presents for follow up of hypertension.  She does not check blood pressure  regularly outside of the clinic. Outpatient blood pressures have not been over 140/90. She does not follow a low salt diet.     She eats 0-1 servings of fruits and vegetables daily.She consumes 0 sweetened beverage(s) daily.She exercises with enough effort to increase her heart rate 9 or less minutes per day.  She exercises with enough effort to increase her heart rate 3 or less days per week.   She is taking medications regularly.                     Objective    /78   Pulse 57   Temp 97.5  F (36.4  C)   Resp 18   Ht 1.674 m (5' 5.9\")   Wt 81.2 kg (179 lb)   LMP  (LMP Unknown)   SpO2 98%   BMI 28.98 kg/m    Body mass index is 28.98 kg/m .  Physical Exam               Signed Electronically by: Yanick Salazar MD    "

## 2024-05-14 NOTE — PATIENT INSTRUCTIONS
You have new onset atrial fibrillation, likely caused from the illness that you had over the past 2 weeks.    I would not suspect that hydrochlorothiazide caused any of your symptoms.  I suspect you had a viral illness which is now passed.    What your illness has caused to strain on your heart and lead to atrial fibrillation.  Atrial fibrillation can also be from high blood pressure, sleep apnea, alcohol and heart ischemia.    Consider an evaluation for sleep apnea through the sleep clinic in the future.    Abstain from alcohol.  Do not drink any more alcohol.    Proceed with heart evaluation with heart echo and heart monitor.  Make an appointment with cardiology clinic to discuss long-term plan for your atrial fibrillation.    See me next week for follow-up on this condition.    Start Eliquis blood thinner to reduce chance of stroke.  Stop your aspirin.    Start metoprolol once a day to keep your heart rate slower with the atrial fibrillation.    Continue the losartan, amlodipine and hydrochlorothiazide at current dose.    If you develop chest pain, feeling faint or like you are going to pass out, or significant increasing shortness of breath, go to the emergency room for immediate evaluation.

## 2024-05-24 ENCOUNTER — OFFICE VISIT (OUTPATIENT)
Dept: INTERNAL MEDICINE | Facility: CLINIC | Age: 74
End: 2024-05-24
Payer: MEDICARE

## 2024-05-24 VITALS
DIASTOLIC BLOOD PRESSURE: 70 MMHG | WEIGHT: 177.9 LBS | SYSTOLIC BLOOD PRESSURE: 150 MMHG | HEIGHT: 66 IN | BODY MASS INDEX: 28.59 KG/M2 | HEART RATE: 74 BPM | TEMPERATURE: 98.2 F | RESPIRATION RATE: 16 BRPM | OXYGEN SATURATION: 97 %

## 2024-05-24 DIAGNOSIS — I48.91 ATRIAL FIBRILLATION, UNSPECIFIED TYPE (H): Primary | ICD-10-CM

## 2024-05-24 DIAGNOSIS — Z82.49 FAMILY HISTORY OF CORONARY ARTERY DISEASE: ICD-10-CM

## 2024-05-24 DIAGNOSIS — I10 ESSENTIAL HYPERTENSION: ICD-10-CM

## 2024-05-24 PROCEDURE — 99214 OFFICE O/P EST MOD 30 MIN: CPT | Performed by: INTERNAL MEDICINE

## 2024-05-24 PROCEDURE — G2211 COMPLEX E/M VISIT ADD ON: HCPCS | Performed by: INTERNAL MEDICINE

## 2024-05-24 RX ORDER — RESPIRATORY SYNCYTIAL VIRUS VACCINE 120MCG/0.5
0.5 KIT INTRAMUSCULAR ONCE
Qty: 1 EACH | Refills: 0 | Status: CANCELLED | OUTPATIENT
Start: 2024-05-24 | End: 2024-05-24

## 2024-05-24 NOTE — PROGRESS NOTES
Karen Li   74 year old female    Date of Visit: 5/24/2024    Chief Complaint   Patient presents with    RECHECK     Subjective  74-year-old female who had new onset atrial fibrillation noted on May 14 when she showed up for a hypertension visit.    She had recently been ill with a respiratory illness, possibly COVID 2 weeks prior to that appointment.  She feels much better, fully recovered now.    She denies any daytime sleepiness and does not feel she has sleep apnea.    She does have a history of drinking 1 to 2 glasses of wine with supper.  But she stopped drinking wine now since her last visit.    No chest pain or chest pressure or increasing shortness of breath or edema.  She does not feel palpitations, atrial fibrillation asymptomatic.    She was given metoprolol 25 mg a day.    She continues on losartan, amlodipine and HCTZ.  Blood pressure was controlled at 137/78 earlier this month at last visit.  She did not check her blood pressure on her own.    She does have a family history of artery disease with father.  In 2019 cardiac CT scan calcium score of 255 but no event.  2019 carotid ultrasound showed plaque but no stenosis.    In May of this year her LDL was 107, she has been taking Lipitor 80 mg a day and denies any missed doses.  Cholesterol was LDL 90 in the past.    She is never smoked.    Diabetes is diet controlled with a hemoglobin A1c level of 6.4% earlier this month.    Normal hemoglobin and TSH earlier this month.    PMHx:  No past medical history on file.  PSHx:    Past Surgical History:   Procedure Laterality Date    ANKLE SURGERY Left 2002    CATARACT EXTRACTION      HYSTERECTOMY      HYSTERECTOMY TOTAL ABDOMINAL, BILATERAL SALPINGO-OOPHORECTOMY, COMBINED Bilateral 1992     Immunizations:   Immunization History   Administered Date(s) Administered    COVID-19 12+ (2023-24) (Pfizer) 01/04/2024    COVID-19 Bivalent 12+ (Pfizer) 10/12/2022    COVID-19 MONOVALENT 12+ (Pfizer) 11/02/2021     "COVID-19 Vaccine ([place]) 04/09/2021    Flu, Unspecified 10/25/2007    Influenza (High Dose) 3 valent vaccine 09/24/2015, 01/24/2017, 02/01/2018, 09/11/2018, 11/19/2019    Influenza (IIV3) PF 10/25/2007, 01/27/2009    Influenza Vaccine 65+ (Fluzone HD) 01/05/2021, 10/12/2022, 12/05/2023    Influenza Vaccine, 6+MO IM (QUADRIVALENT W/PRESERVATIVES) 01/27/2009, 12/08/2009    Influenza, seasonal, injectable, PF 12/08/2009, 03/15/2011, 09/05/2012    Pneumo Conj 13-V (2010&after) 09/24/2015    Pneumococcal 23 valent 01/24/2017    Rabies Immune Globulin 07/12/2013    Rabies Vaccine 07/12/2013, 07/15/2013, 07/19/2013, 07/26/2013    Rabies, Unspecified 07/12/2013, 07/15/2013, 07/19/2013, 07/26/2013    TDAP (Adacel,Boostrix) 07/02/2007, 01/24/2017    Td (Adult), Adsorbed 08/05/1994       ROS A comprehensive review of systems was performed and was otherwise negative    Medications, allergies, and problem list were reviewed and updated    Exam  BP (!) 150/70   Pulse 74   Temp 98.2  F (36.8  C) (Oral)   Resp 16   Ht 1.674 m (5' 5.9\")   Wt 80.7 kg (177 lb 14.4 oz)   LMP  (LMP Unknown)   SpO2 97%   BMI 28.80 kg/m    Appears well.  Lungs are clear.  Heart is irregularly irregular with no murmur.  No edema    Blood pressure rechecked by me was above    Assessment/Plan  1. Atrial fibrillation, unspecified type (H)  Rate controlled and asymptomatic.  I suspect was precipitated by the URI 2 weeks prior to her appointment earlier this month.    She denies symptoms of sleep apnea and did not want to get evaluated for sleep apnea.    She has stopped drinking alcohol    Blood pressure was high today although controlled last visit.    Family history of coronary disease and history of coronary calcium but she has not had chest pain or chest pressure.    She still has to proceed with her heart echo and heart monitor scheduled for next week, then sees cardiology on June 12.    I did discuss electrical cardioversion with patient and " she may be a candidate for that.      2. Family history of coronary artery disease  Has not had chest pain.  Continue atorvastatin at current dose, she denies any missed doses.  Goal LDL less than 100, it was slightly above 100 but she had been recently ill in the weeks before that check.    3. Essential hypertension  Not controlled today.  Continue current losartan and amlodipine and HCTZ.  Continue Toprol-XL 25 mg a day which was recently added.    She will follow blood pressure at home.  If blood pressure is running significantly higher, could consider increasing amlodipine or the HCTZ.  Follow-up in 1 month for reevaluation of blood pressure with me.    The longitudinal plan of care for the diagnosis(es)/condition(s) as documented were addressed during this visit. Due to the added complexity in care, I will continue to support Karen in the subsequent management and with ongoing continuity of care.        Return in 26 days (on 6/19/2024) for Blood pressure follow-up appointment.   Patient Instructions   Proceed with your heart echo and heart monitor as planned next week.  See cardiology to discuss your atrial fibrillation.  If you are still in atrial fibrillation, cardiology will likely discuss electrical cardioversion procedure with you.    Continue to not drink any alcohol.    Check your blood pressure.  Goal blood pressure less than 135/85.    See me on June 19 for a blood pressure and atrial fibrillation follow-up appointment.        Yanick Salazar MD, MD        Current Outpatient Medications   Medication Sig Dispense Refill    amLODIPine (NORVASC) 5 MG tablet [AMLODIPINE (NORVASC) 5 MG TABLET] TAKE 1 TABLET BY MOUTH EVERY DAY 90 tablet 3    apixaban ANTICOAGULANT (ELIQUIS) 5 MG tablet Take 1 tablet (5 mg) by mouth 2 times daily 60 tablet 5    atorvastatin (LIPITOR) 80 MG tablet [ATORVASTATIN (LIPITOR) 80 MG TABLET] TAKE 1 TABLET BY MOUTH EVERYDAY AT BEDTIME 90 tablet 2    calcium carbonate-vitamin D3 600  "mg(1,500mg) -400 unit Chew [CALCIUM CARBONATE-VITAMIN D3 600 MG(1,500MG) -400 UNIT CHEW] Chew 1 tablet daily.             hydroCHLOROthiazide 12.5 MG tablet Take 1 tablet (12.5 mg) by mouth daily 90 tablet 3    losartan (COZAAR) 100 MG tablet [LOSARTAN (COZAAR) 100 MG TABLET] TAKE 1 TABLET BY MOUTH EVERY DAY 90 tablet 1    metoprolol succinate ER (TOPROL XL) 25 MG 24 hr tablet Take 1 tablet (25 mg) by mouth daily 90 tablet 3    MULTIVITAMIN ORAL [MULTIVITAMIN ORAL] Take 1 tablet by mouth daily.       No Known Allergies  Social History     Tobacco Use    Smoking status: Never     Passive exposure: Never    Smokeless tobacco: Never   Vaping Use    Vaping status: Never Used   Substance Use Topics    Alcohol use: Yes     Comment: Alcoholic Drinks/day: wine 2-3 glasses of wine a day    Drug use: No             Subjective   Karen is a 74 year old, presenting for the following health issues:  RECHECK        5/24/2024    10:42 AM   Additional Questions   Roomed by YFN FREITAS     History of Present Illness       Hypertension: She presents for follow up of hypertension.  She does not check blood pressure  regularly outside of the clinic. Outpatient blood pressures have not been over 140/90. She does not follow a low salt diet.     She eats 0-1 servings of fruits and vegetables daily.She consumes 0 sweetened beverage(s) daily.She exercises with enough effort to increase her heart rate 9 or less minutes per day.  She exercises with enough effort to increase her heart rate 3 or less days per week.   She is taking medications regularly.                     Objective    BP (!) 159/76 (BP Location: Right arm, Patient Position: Sitting, Cuff Size: Adult Regular)   Pulse 74   Temp 98.2  F (36.8  C) (Oral)   Resp 16   Ht 1.674 m (5' 5.9\")   Wt 80.7 kg (177 lb 14.4 oz)   LMP  (LMP Unknown)   SpO2 97%   BMI 28.80 kg/m    Body mass index is 28.8 kg/m .  Physical Exam               Signed Electronically by: Yanick Salazar MD    "

## 2024-05-24 NOTE — PATIENT INSTRUCTIONS
Proceed with your heart echo and heart monitor as planned next week.  See cardiology to discuss your atrial fibrillation.  If you are still in atrial fibrillation, cardiology will likely discuss electrical cardioversion procedure with you.    Continue to not drink any alcohol.    Check your blood pressure.  Goal blood pressure less than 135/85.    See me on June 19 for a blood pressure and atrial fibrillation follow-up appointment.

## 2024-05-28 ENCOUNTER — HOSPITAL ENCOUNTER (OUTPATIENT)
Dept: CARDIOLOGY | Facility: CLINIC | Age: 74
Discharge: HOME | End: 2024-05-28
Attending: INTERNAL MEDICINE
Payer: MEDICARE

## 2024-05-28 DIAGNOSIS — I48.91 ATRIAL FIBRILLATION, UNSPECIFIED TYPE (H): ICD-10-CM

## 2024-05-28 PROCEDURE — 93306 TTE W/DOPPLER COMPLETE: CPT | Mod: 26 | Performed by: INTERNAL MEDICINE

## 2024-05-28 PROCEDURE — 93270 REMOTE 30 DAY ECG REV/REPORT: CPT

## 2024-05-28 PROCEDURE — 93306 TTE W/DOPPLER COMPLETE: CPT

## 2024-06-06 PROCEDURE — 93228 REMOTE 30 DAY ECG REV/REPORT: CPT | Performed by: INTERNAL MEDICINE

## 2024-06-12 ENCOUNTER — OFFICE VISIT (OUTPATIENT)
Dept: CARDIOLOGY | Facility: CLINIC | Age: 74
End: 2024-06-12
Attending: INTERNAL MEDICINE
Payer: MEDICARE

## 2024-06-12 VITALS
WEIGHT: 177.8 LBS | HEART RATE: 83 BPM | OXYGEN SATURATION: 98 % | BODY MASS INDEX: 28.57 KG/M2 | HEIGHT: 66 IN | SYSTOLIC BLOOD PRESSURE: 124 MMHG | DIASTOLIC BLOOD PRESSURE: 71 MMHG

## 2024-06-12 DIAGNOSIS — I10 BENIGN ESSENTIAL HYPERTENSION: Primary | ICD-10-CM

## 2024-06-12 DIAGNOSIS — I48.20 CHRONIC ATRIAL FIBRILLATION (H): ICD-10-CM

## 2024-06-12 DIAGNOSIS — I48.91 ATRIAL FIBRILLATION, UNSPECIFIED TYPE (H): ICD-10-CM

## 2024-06-12 PROCEDURE — 99204 OFFICE O/P NEW MOD 45 MIN: CPT | Performed by: INTERNAL MEDICINE

## 2024-06-12 PROCEDURE — 80048 BASIC METABOLIC PNL TOTAL CA: CPT | Performed by: INTERNAL MEDICINE

## 2024-06-12 PROCEDURE — 36415 COLL VENOUS BLD VENIPUNCTURE: CPT | Performed by: INTERNAL MEDICINE

## 2024-06-12 NOTE — LETTER
6/12/2024    Yanick Salazar MD  [address] [place] [address]    RE: Karen Li       Dear Colleague,     I had the pleasure of seeing Karen Li in the [facility].      Cardiology Clinic Consultation:    June 12, 2024   Patient Name: Karen Li  Patient MRN: [id number]    Consult indication: atrial fibrillation     HPI:    I had the opportunity to see patient Karen Li in cardiology clinic for a consultation. Patient is followed by our colleague Yanick Salazar MD with Primary Care.     As you know patient is a pleasant 74-year-old female with a past medical history significant for hypertension, dyslipidemia, TIA, who presents for further evaluation and management of recently diagnosed atrial fibrillation.    Patient was seen in clinic with Dr. Salazar 5/14/2024, she had been recovering from a suspected viral illness.  Reviewed thoughtful clinic notes from 5/14/2024, 5/24/2024.  At that clinic visit she was found to be in atrial fibrillation with RVR, she was asymptomatic.  She was appropriately started on metoprolol succinate 25 mg daily, as well as apixaban 5 mg twice daily, aspirin was discontinued.  Cardiac event monitor from 5/28 to 6/3/2024 demonstrated rhythm was atrial fibrillation with average heart rate of 86 bpm.  There were a few PVCs.  TTE 5/20/2024 LVEF 50 to 55%, normal RV function, trace AI, mild to moderate MR, mild to moderate left atrial enlargement.    Patient reports that overall she feels well.  She denies any chest pain, chest pressure, abnormal shortness of breath, palpitations.  She has been tolerating apixaban without abnormal bleeding/bruising.    Assessment and Plan/Recommendations:    # Chronic atrial fibrillation, [id number] 6-7 (HA1c borderline). Asymptomatic. Rate controlled.   # Mild valvular abnormalities on TTE  # HTN, BP within goal <130/80 mmHg  # HL, on statin  # TIA    -Reviewed pathophysiology of atrial fibrillation, discussed options for  further evaluation/management.  - Patient has been managed with metoprolol with subsequent monitor demonstrating adequate heart rate control, and was appropriately started on apixaban for prevention of stroke.  Regarding anticoagulation, discussed risks of bleeding weighed against benefits of stroke prevention.  She denies issues with abnormal bleeding/bruising, no apparent contraindications to anticoagulation.  Discussed rate versus rhythm control strategies, i.e. cardioversion versus continued rate control.  Since patient is asymptomatic and heart rates are well-controlled, recommend continuing current regimen.   - Will recheck a BMP today since last lab suggested mild dehydration  - Zio patch monitor in about 1 month to ensure adequate rate control, quantification of PVC burden   - Reviewed that her care for this atrial fibrillation has been excellent, and at this time I recommend no change to the management plan made by her PCP, she may follow-up with cardiology as needed in the future, should have a TTE done with PCP in 1 year to reassess valve abnormalities however I suspect improvement with improved blood pressure control      Thank you for allowing our team to participate in the care of Karen Li.  Please do not hesitate to call or page me with any questions or concerns.    Sincerely,     Jona Martinez MD, [facility]  Cardiology  June 12, 2024      Yanick Salazar MD  [address] [place] [address]      Total time spent on this encounter today: Greater than 45 minutes, providing care in this encounter including, but not limited to, reviewing prior medical records, laboratory data, imaging studies, diagnostic studies, procedure notes, formulating an assessment and plan, recommendations, discussion and counseling with patient face to face, dictation.    Past Medical History:     Patient Active Problem List   Diagnosis    Transient Ischemic Attack    Macrocytosis without anemia     Hypercholesterolemia    Squamous cell carcinoma of skin, unspecified    HTN (hypertension)       Past Surgical History:   Past Surgical History:   Procedure Laterality Date    ANKLE SURGERY Left 2002    CATARACT EXTRACTION      HYSTERECTOMY      HYSTERECTOMY TOTAL ABDOMINAL, BILATERAL SALPINGO-OOPHORECTOMY, COMBINED Bilateral 1992       Medications (outpatient):  Current Outpatient Medications   Medication Sig Dispense Refill    amLODIPine (NORVASC) 5 MG tablet [AMLODIPINE (NORVASC) 5 MG TABLET] TAKE 1 TABLET BY MOUTH EVERY DAY 90 tablet 3    apixaban ANTICOAGULANT (ELIQUIS) 5 MG tablet Take 1 tablet (5 mg) by mouth 2 times daily 60 tablet 5    atorvastatin (LIPITOR) 80 MG tablet [ATORVASTATIN (LIPITOR) 80 MG TABLET] TAKE 1 TABLET BY MOUTH EVERYDAY AT BEDTIME 90 tablet 2    calcium carbonate-vitamin D3 600 mg(1,500mg) -400 unit Chew [CALCIUM CARBONATE-VITAMIN D3 600 MG(1,500MG) -400 UNIT CHEW] Chew 1 tablet daily.             hydroCHLOROthiazide 12.5 MG tablet Take 1 tablet (12.5 mg) by mouth daily 90 tablet 3    losartan (COZAAR) 100 MG tablet [LOSARTAN (COZAAR) 100 MG TABLET] TAKE 1 TABLET BY MOUTH EVERY DAY 90 tablet 1    metoprolol succinate ER (TOPROL XL) 25 MG 24 hr tablet Take 1 tablet (25 mg) by mouth daily 90 tablet 3    MULTIVITAMIN ORAL [MULTIVITAMIN ORAL] Take 1 tablet by mouth daily.         Allergies:  No Known Allergies    Social History:   History   Drug Use No      History   Smoking Status    Never   Smokeless Tobacco    Never     Social History    Substance and Sexual Activity      Alcohol use: Yes        Comment: Alcoholic Drinks/day: wine 2-3 glasses of wine a day       Family History:  Family History   Problem Relation Age of Onset    Coronary Artery Disease Father 70.00    Heart Disease Father     Dementia Mother        Review of Systems:   A comprehensive 12 system review of systems was carried out.  Pertinent positives and negatives are noted above. Otherwise negative for contributory  information.    Objective & Physical Exam:  LMP  (LMP Unknown)   Wt Readings from Last 2 Encounters:   05/24/24 80.7 kg (177 lb 14.4 oz)   05/14/24 81.2 kg (179 lb)     There is no height or weight on file to calculate BMI.   There is no height or weight on file to calculate BSA.    Constitutional: appears stated age, in no apparent distress, appears to be well nourished  Pulmonary: clear to auscultation bilaterally, no wheezes, no rales, no increased work of breathing  Cardiovascular: JVP normal, regular rate, irregularly irregular rhythm, normal S1 and S2, no S3, S4, no murmur appreciated, no lower extremity edema  Neurologic: awake, alert, moves all extremities  Skin: no jaundice, warm on limited exam    Data reviewed:  Lab Results   Component Value Date    WBC 7.7 05/14/2024    RBC 3.69 (L) 05/14/2024    HGB 12.8 05/14/2024    HCT 38.6 05/14/2024     (H) 05/14/2024    MCH 34.7 (H) 05/14/2024    MCHC 33.2 05/14/2024    RDW 12.7 05/14/2024     05/14/2024     Sodium   Date Value Ref Range Status   05/14/2024 138 135 - 145 mmol/L Final     Comment:     Reference intervals for this test were updated on 09/26/2023 to more accurately reflect our healthy population. There may be differences in the flagging of prior results with similar values performed with this method. Interpretation of those prior results can be made in the context of the updated reference intervals.      Potassium   Date Value Ref Range Status   05/14/2024 3.9 3.4 - 5.3 mmol/L Final   11/02/2021 4.2 3.5 - 5.0 mmol/L Final     Chloride   Date Value Ref Range Status   05/14/2024 99 98 - 107 mmol/L Final   11/02/2021 104 98 - 107 mmol/L Final     Carbon Dioxide (CO2)   Date Value Ref Range Status   05/14/2024 27 22 - 29 mmol/L Final   11/02/2021 26 22 - 31 mmol/L Final     Anion Gap   Date Value Ref Range Status   05/14/2024 12 7 - 15 mmol/L Final   11/02/2021 9 5 - 18 mmol/L Final     Glucose   Date Value Ref Range Status   05/14/2024 110  (H) 70 - 99 mg/dL Final   11/02/2021 112 70 - 125 mg/dL Final     Urea Nitrogen   Date Value Ref Range Status   05/14/2024 25.9 (H) 8.0 - 23.0 mg/dL Final   11/02/2021 14 8 - 28 mg/dL Final     Creatinine   Date Value Ref Range Status   05/14/2024 1.01 (H) 0.51 - 0.95 mg/dL Final     GFR Estimate   Date Value Ref Range Status   05/14/2024 57 (L) >60 mL/min/1.73m2 Final   01/05/2021 >60 >60 mL/min/1.73m2 Final     Calcium   Date Value Ref Range Status   05/14/2024 9.9 8.8 - 10.2 mg/dL Final     Bilirubin Total   Date Value Ref Range Status   05/14/2024 0.4 <=1.2 mg/dL Final     Alkaline Phosphatase   Date Value Ref Range Status   05/14/2024 59 40 - 150 U/L Final     Comment:     Reference intervals for this test were updated on 11/14/2023 to more accurately reflect our healthy population. There may be differences in the flagging of prior results with similar values performed with this method. Interpretation of those prior results can be made in the context of the updated reference intervals.     ALT   Date Value Ref Range Status   05/14/2024 23 0 - 50 U/L Final     Comment:     Reference intervals for this test were updated on 6/12/2023 to more accurately reflect our healthy population. There may be differences in the flagging of prior results with similar values performed with this method. Interpretation of those prior results can be made in the context of the updated reference intervals.       AST   Date Value Ref Range Status   05/14/2024 31 0 - 45 U/L Final     Comment:     Reference intervals for this test were updated on 6/12/2023 to more accurately reflect our healthy population. There may be differences in the flagging of prior results with similar values performed with this method. Interpretation of those prior results can be made in the context of the updated reference intervals.     Recent Labs   Lab Test 05/14/24  1201 12/05/23  1000 10/12/22  1401 07/22/21  1230   CHOL  --  221*  --  177   HDL  --  74   --  59   * 123*   < > 100   TRIG  --  118  --  90    < > = values in this interval not displayed.      Lab Results   Component Value Date    A1C 6.4 2024        Recent Results (from the past 4320 hour(s))   Echocardiogram Complete    Narrative    [id number]  [id number]  [id number]  [id number]^GREY^GERMAN^[place] [address]     Name: NEIL HOLLINGSWORTH  MRN: [id number]  : 1950  Study Date: 2024 12:53 PM  Age: 74 yrs  Gender: Female  Patient Location: [facility]  Reason For Study: Atrial fibrillation, unspecified type (H)  Ordering Physician: GERMAN EDMONDS  Referring Physician: GERMAN EDMONDS  Performed By: MELL     BSA: 2.0 m2  Height: 69 in  Weight: 177 lb  BP: 141/86 mmHg  ______________________________________________________________________________  Procedure  Complete Echo Adult.  ______________________________________________________________________________  Interpretation Summary     1. Normal left ventricular size and systolic performance with a visually  estimated ejection fraction of 50-55%.  2. There is trace aortic insufficiency.  3. There is mild to moderate mitral insufficiency.  4. Normal right ventricular size and systolic performance.  5. There is mild to moderate left atrial enlargement.  ______________________________________________________________________________  Left ventricle:  Normal left ventricular size and systolic performance with a visually  estimated ejection fraction of 50-55%. There is normal regional wall motion.  Left ventricular wall thickness is normal.     Assessment of LV Diastolic Function: Patient appears to be in atrial  fibrillation which limits assessment of diastolic filling.     Right ventricle:  Normal right ventricular size and systolic performance.     Left atrium:  There is mild to moderate left atrial enlargement.     Right atrium:  There is borderline right atrial enlargement.     IVC:  The IVC is of normal  caliber.     Aortic valve:  The aortic valve is comprised of three cusps. No significant aortic stenosis  or aortic insufficiency is detected on this study. There is trace aortic  insufficiency.     Mitral valve:  The mitral valve appears morphologically normal. There is mild to moderate  mitral insufficiency.     Tricuspid valve:  The tricuspid valve is grossly morphologically normal. There is mild tricuspid  insufficiency.     Pulmonic valve:  The pulmonic valve is grossly morphologically normal. There is trace pulmonic  insufficiency.     Thoracic aorta:  The aortic root and proximal ascending aorta are of normal dimension.     Pericardium:  There is no significant pericardial effusion.  ______________________________________________________________________________  ______________________________________________________________________________  MMode/2D Measurements & Calculations  IVSd: 0.92 cm  LVIDd: 4.9 cm  LVIDs: 3.7 cm  LVPWd: 0.89 cm  FS: 24.1 %  LV mass(C)d: 152.3 grams  LV mass(C)dI: 77.7 grams/m2  LA dimension: 4.3 cm  asc Aorta Diam: 3.6 cm  LVOT diam: 2.0 cm  LVOT area: 3.1 cm2  Asc Ao diam index BSA (cm/m2): 1.8  Asc Ao diam index Ht(cm/m): 2.1  EF Biplane: 54.4 %  LA Volume (BP): 66.5 ml     LA Volume Index (BP): 33.9 ml/m2  LA Volume Indexed (AL/bp): 36.6 ml/m2  RV Base: 4.3 cm  RWT: 0.37  TAPSE: 1.9 cm     Time Measurements  MM HR: 91.0 BPM     Doppler Measurements & Calculations  MV E max gopi: 82.6 cm/sec  MV max P.4 mmHg  MV dec time: 0.19 sec  Ao V2 max: 210.3 cm/sec  Ao max P.0 mmHg  Ao V2 mean: 393.0 cm/sec  Ao mean P.0 mmHg  Ao V2 VTI: 144.0 cm  ADRIANA(I,D): 0.31 cm2  ADRIANA(V,D): 1.4 cm2  LV V1 max PG: 3.5 mmHg  LV V1 max: 93.1 cm/sec  LV V1 VTI: 14.3 cm  SV(LVOT): 44.8 ml  SI(LVOT): 22.9 ml/m2  PA acc time: 0.11 sec  TR max gopi: 239.0 cm/sec  TR max P.8 mmHg  AV Gopi Ratio (DI): 0.44  ADRIANA Index (cm2/m2): 0.16  E/E': 6.9  E/E' av.8     Lateral E/e': 6.9  Medial E/e':  8.7  Peak E' Gopi: 12.0 cm/sec  RV S Gopi: 12.8 cm/sec     ______________________________________________________________________________  Report approved by: Jerry Allen 05/28/2024 02:34 PM                Thank you for allowing me to participate in the care of your patient.      Sincerely,     Jona Martinez MD     [facility]  cc:   Yanick [place] MD  [address] [place] [address]

## 2024-06-12 NOTE — PATIENT INSTRUCTIONS
June 12, 2024    Thank you for allowing our Cardiology team to participate in your care.     Please note the following changes to your heart treatment plan:     Medication changes:   - none    Tests to be done:  - labs today  - ZioPatch monitor in about 1 month    Follow up:  - Follow up as needed      For scheduling, please call [phone].      Please contact our team through [employer] or our Nurse Team Voicemail service [phone], or the General Clinic [phone] for any questions or concerns.     If you are having a medical emergency, please call [phone].     Sincerely,    Jona Martinez MD, [facility]  Cardiology    [facility]

## 2024-06-12 NOTE — PROGRESS NOTES
Cardiology Clinic Consultation:    June 12, 2024   Patient Name: Karen Li  Patient MRN: [id number]    Consult indication: atrial fibrillation     HPI:    I had the opportunity to see patient Karen Li in cardiology clinic for a consultation. Patient is followed by our colleague Yanick Salazar MD with Primary Care.     As you know patient is a pleasant 74-year-old female with a past medical history significant for hypertension, dyslipidemia, TIA, who presents for further evaluation and management of recently diagnosed atrial fibrillation.    Patient was seen in clinic with Dr. Salazar 5/14/2024, she had been recovering from a suspected viral illness.  Reviewed thoughtful clinic notes from 5/14/2024, 5/24/2024.  At that clinic visit she was found to be in atrial fibrillation with RVR, she was asymptomatic.  She was appropriately started on metoprolol succinate 25 mg daily, as well as apixaban 5 mg twice daily, aspirin was discontinued.  Cardiac event monitor from 5/28 to 6/3/2024 demonstrated rhythm was atrial fibrillation with average heart rate of 86 bpm.  There were a few PVCs.  TTE 5/20/2024 LVEF 50 to 55%, normal RV function, trace AI, mild to moderate MR, mild to moderate left atrial enlargement.    Patient reports that overall she feels well.  She denies any chest pain, chest pressure, abnormal shortness of breath, palpitations.  She has been tolerating apixaban without abnormal bleeding/bruising.    Assessment and Plan/Recommendations:    # Chronic atrial fibrillation, [id number] 6-7 (HA1c borderline). Asymptomatic. Rate controlled.   # Mild valvular abnormalities on TTE  # HTN, BP within goal <130/80 mmHg  # HL, on statin  # TIA    -Reviewed pathophysiology of atrial fibrillation, discussed options for further evaluation/management.  - Patient has been managed with metoprolol with subsequent monitor demonstrating adequate heart rate control, and was appropriately started on apixaban for  prevention of stroke.  Regarding anticoagulation, discussed risks of bleeding weighed against benefits of stroke prevention.  She denies issues with abnormal bleeding/bruising, no apparent contraindications to anticoagulation.  Discussed rate versus rhythm control strategies, i.e. cardioversion versus continued rate control.  Since patient is asymptomatic and heart rates are well-controlled, recommend continuing current regimen.   - Will recheck a BMP today since last lab suggested mild dehydration  - Zio patch monitor in about 1 month to ensure adequate rate control, quantification of PVC burden   - Reviewed that her care for this atrial fibrillation has been excellent, and at this time I recommend no change to the management plan made by her PCP, she may follow-up with cardiology as needed in the future, should have a TTE done with PCP in 1 year to reassess valve abnormalities however I suspect improvement with improved blood pressure control      Thank you for allowing our team to participate in the care of Karen Li.  Please do not hesitate to call or page me with any questions or concerns.    Sincerely,     Jona Martinez MD, [facility]  Cardiology  June 12, 2024      Yanick Salazar MD  [address] [facility] [place] [address]      Total time spent on this encounter today: Greater than 45 minutes, providing care in this encounter including, but not limited to, reviewing prior medical records, laboratory data, imaging studies, diagnostic studies, procedure notes, formulating an assessment and plan, recommendations, discussion and counseling with patient face to face, dictation.    Past Medical History:     Patient Active Problem List   Diagnosis    Transient Ischemic Attack    Macrocytosis without anemia    Hypercholesterolemia    Squamous cell carcinoma of skin, unspecified    HTN (hypertension)       Past Surgical History:   Past Surgical History:   Procedure Laterality Date    ANKLE SURGERY Left  2002    CATARACT EXTRACTION      HYSTERECTOMY      HYSTERECTOMY TOTAL ABDOMINAL, BILATERAL SALPINGO-OOPHORECTOMY, COMBINED Bilateral 1992       Medications (outpatient):  Current Outpatient Medications   Medication Sig Dispense Refill    amLODIPine (NORVASC) 5 MG tablet [AMLODIPINE (NORVASC) 5 MG TABLET] TAKE 1 TABLET BY MOUTH EVERY DAY 90 tablet 3    apixaban ANTICOAGULANT (ELIQUIS) 5 MG tablet Take 1 tablet (5 mg) by mouth 2 times daily 60 tablet 5    atorvastatin (LIPITOR) 80 MG tablet [ATORVASTATIN (LIPITOR) 80 MG TABLET] TAKE 1 TABLET BY MOUTH EVERYDAY AT BEDTIME 90 tablet 2    calcium carbonate-vitamin D3 600 mg(1,500mg) -400 unit Chew [CALCIUM CARBONATE-VITAMIN D3 600 MG(1,500MG) -400 UNIT CHEW] Chew 1 tablet daily.             hydroCHLOROthiazide 12.5 MG tablet Take 1 tablet (12.5 mg) by mouth daily 90 tablet 3    losartan (COZAAR) 100 MG tablet [LOSARTAN (COZAAR) 100 MG TABLET] TAKE 1 TABLET BY MOUTH EVERY DAY 90 tablet 1    metoprolol succinate ER (TOPROL XL) 25 MG 24 hr tablet Take 1 tablet (25 mg) by mouth daily 90 tablet 3    MULTIVITAMIN ORAL [MULTIVITAMIN ORAL] Take 1 tablet by mouth daily.         Allergies:  No Known Allergies    Social History:   History   Drug Use No      History   Smoking Status    Never   Smokeless Tobacco    Never     Social History    Substance and Sexual Activity      Alcohol use: Yes        Comment: Alcoholic Drinks/day: wine 2-3 glasses of wine a day       Family History:  Family History   Problem Relation Age of Onset    Coronary Artery Disease Father 70.00    Heart Disease Father     Dementia Mother        Review of Systems:   A comprehensive 12 system review of systems was carried out.  Pertinent positives and negatives are noted above. Otherwise negative for contributory information.    Objective & Physical Exam:  LMP  (LMP Unknown)   Wt Readings from Last 2 Encounters:   05/24/24 80.7 kg (177 lb 14.4 oz)   05/14/24 81.2 kg (179 lb)     There is no height or weight on  file to calculate BMI.   There is no height or weight on file to calculate BSA.    Constitutional: appears stated age, in no apparent distress, appears to be well nourished  Pulmonary: clear to auscultation bilaterally, no wheezes, no rales, no increased work of breathing  Cardiovascular: JVP normal, regular rate, irregularly irregular rhythm, normal S1 and S2, no S3, S4, no murmur appreciated, no lower extremity edema  Neurologic: awake, alert, moves all extremities  Skin: no jaundice, warm on limited exam    Data reviewed:  Lab Results   Component Value Date    WBC 7.7 05/14/2024    RBC 3.69 (L) 05/14/2024    HGB 12.8 05/14/2024    HCT 38.6 05/14/2024     (H) 05/14/2024    MCH 34.7 (H) 05/14/2024    MCHC 33.2 05/14/2024    RDW 12.7 05/14/2024     05/14/2024     Sodium   Date Value Ref Range Status   05/14/2024 138 135 - 145 mmol/L Final     Comment:     Reference intervals for this test were updated on 09/26/2023 to more accurately reflect our healthy population. There may be differences in the flagging of prior results with similar values performed with this method. Interpretation of those prior results can be made in the context of the updated reference intervals.      Potassium   Date Value Ref Range Status   05/14/2024 3.9 3.4 - 5.3 mmol/L Final   11/02/2021 4.2 3.5 - 5.0 mmol/L Final     Chloride   Date Value Ref Range Status   05/14/2024 99 98 - 107 mmol/L Final   11/02/2021 104 98 - 107 mmol/L Final     Carbon Dioxide (CO2)   Date Value Ref Range Status   05/14/2024 27 22 - 29 mmol/L Final   11/02/2021 26 22 - 31 mmol/L Final     Anion Gap   Date Value Ref Range Status   05/14/2024 12 7 - 15 mmol/L Final   11/02/2021 9 5 - 18 mmol/L Final     Glucose   Date Value Ref Range Status   05/14/2024 110 (H) 70 - 99 mg/dL Final   11/02/2021 112 70 - 125 mg/dL Final     Urea Nitrogen   Date Value Ref Range Status   05/14/2024 25.9 (H) 8.0 - 23.0 mg/dL Final   11/02/2021 14 8 - 28 mg/dL Final      Creatinine   Date Value Ref Range Status   05/14/2024 1.01 (H) 0.51 - 0.95 mg/dL Final     GFR Estimate   Date Value Ref Range Status   05/14/2024 57 (L) >60 mL/min/1.73m2 Final   01/05/2021 >60 >60 mL/min/1.73m2 Final     Calcium   Date Value Ref Range Status   05/14/2024 9.9 8.8 - 10.2 mg/dL Final     Bilirubin Total   Date Value Ref Range Status   05/14/2024 0.4 <=1.2 mg/dL Final     Alkaline Phosphatase   Date Value Ref Range Status   05/14/2024 59 40 - 150 U/L Final     Comment:     Reference intervals for this test were updated on 11/14/2023 to more accurately reflect our healthy population. There may be differences in the flagging of prior results with similar values performed with this method. Interpretation of those prior results can be made in the context of the updated reference intervals.     ALT   Date Value Ref Range Status   05/14/2024 23 0 - 50 U/L Final     Comment:     Reference intervals for this test were updated on 6/12/2023 to more accurately reflect our healthy population. There may be differences in the flagging of prior results with similar values performed with this method. Interpretation of those prior results can be made in the context of the updated reference intervals.       AST   Date Value Ref Range Status   05/14/2024 31 0 - 45 U/L Final     Comment:     Reference intervals for this test were updated on 6/12/2023 to more accurately reflect our healthy population. There may be differences in the flagging of prior results with similar values performed with this method. Interpretation of those prior results can be made in the context of the updated reference intervals.     Recent Labs   Lab Test 05/14/24  1201 12/05/23  1000 10/12/22  1401 07/22/21  1230   CHOL  --  221*  --  177   HDL  --  74  --  59   * 123*   < > 100   TRIG  --  118  --  90    < > = values in this interval not displayed.      Lab Results   Component Value Date    A1C 6.4 05/14/2024        Recent Results  (from the past 4320 hour(s))   Echocardiogram Complete    Narrative    [id number]  [id number]  [id number]  [id number]^GREY^GERMAN^[place] [address]     Name: NEIL HOLLINGSWORTH  MRN: [id number]  : 1950  Study Date: 2024 12:53 PM  Age: 74 yrs  Gender: Female  Patient Location: [facility]  Reason For Study: Atrial fibrillation, unspecified type (H)  Ordering Physician: GERMAN EDMONDS  Referring Physician: GERMAN EDMONDS  Performed By: MELL     BSA: 2.0 m2  Height: 69 in  Weight: 177 lb  BP: 141/86 mmHg  ______________________________________________________________________________  Procedure  Complete Echo Adult.  ______________________________________________________________________________  Interpretation Summary     1. Normal left ventricular size and systolic performance with a visually  estimated ejection fraction of 50-55%.  2. There is trace aortic insufficiency.  3. There is mild to moderate mitral insufficiency.  4. Normal right ventricular size and systolic performance.  5. There is mild to moderate left atrial enlargement.  ______________________________________________________________________________  Left ventricle:  Normal left ventricular size and systolic performance with a visually  estimated ejection fraction of 50-55%. There is normal regional wall motion.  Left ventricular wall thickness is normal.     Assessment of LV Diastolic Function: Patient appears to be in atrial  fibrillation which limits assessment of diastolic filling.     Right ventricle:  Normal right ventricular size and systolic performance.     Left atrium:  There is mild to moderate left atrial enlargement.     Right atrium:  There is borderline right atrial enlargement.     IVC:  The IVC is of normal caliber.     Aortic valve:  The aortic valve is comprised of three cusps. No significant aortic stenosis  or aortic insufficiency is detected on this study. There is trace  aortic  insufficiency.     Mitral valve:  The mitral valve appears morphologically normal. There is mild to moderate  mitral insufficiency.     Tricuspid valve:  The tricuspid valve is grossly morphologically normal. There is mild tricuspid  insufficiency.     Pulmonic valve:  The pulmonic valve is grossly morphologically normal. There is trace pulmonic  insufficiency.     Thoracic aorta:  The aortic root and proximal ascending aorta are of normal dimension.     Pericardium:  There is no significant pericardial effusion.  ______________________________________________________________________________  ______________________________________________________________________________  MMode/2D Measurements & Calculations  IVSd: 0.92 cm  LVIDd: 4.9 cm  LVIDs: 3.7 cm  LVPWd: 0.89 cm  FS: 24.1 %  LV mass(C)d: 152.3 grams  LV mass(C)dI: 77.7 grams/m2  LA dimension: 4.3 cm  asc Aorta Diam: 3.6 cm  LVOT diam: 2.0 cm  LVOT area: 3.1 cm2  Asc Ao diam index BSA (cm/m2): 1.8  Asc Ao diam index Ht(cm/m): 2.1  EF Biplane: 54.4 %  LA Volume (BP): 66.5 ml     LA Volume Index (BP): 33.9 ml/m2  LA Volume Indexed (AL/bp): 36.6 ml/m2  RV Base: 4.3 cm  RWT: 0.37  TAPSE: 1.9 cm     Time Measurements  MM HR: 91.0 BPM     Doppler Measurements & Calculations  MV E max gopi: 82.6 cm/sec  MV max P.4 mmHg  MV dec time: 0.19 sec  Ao V2 max: 210.3 cm/sec  Ao max P.0 mmHg  Ao V2 mean: 393.0 cm/sec  Ao mean P.0 mmHg  Ao V2 VTI: 144.0 cm  ADRIANA(I,D): 0.31 cm2  ADRIANA(V,D): 1.4 cm2  LV V1 max PG: 3.5 mmHg  LV V1 max: 93.1 cm/sec  LV V1 VTI: 14.3 cm  SV(LVOT): 44.8 ml  SI(LVOT): 22.9 ml/m2  PA acc time: 0.11 sec  TR max gopi: 239.0 cm/sec  TR max P.8 mmHg  AV Gopi Ratio (DI): 0.44  ADRIANA Index (cm2/m2): 0.16  E/E': 6.9  E/E' av.8     Lateral E/e': 6.9  Medial E/e': 8.7  Peak E' Gopi: 12.0 cm/sec  RV S Gopi: 12.8 cm/sec     ______________________________________________________________________________  Report approved by: Miguel Ahuja,  Jerry 05/28/2024 02:34 PM

## 2024-06-13 LAB
ANION GAP SERPL CALCULATED.3IONS-SCNC: 11 MMOL/L (ref 7–15)
BUN SERPL-MCNC: 12.5 MG/DL (ref 8–23)
CALCIUM SERPL-MCNC: 10.6 MG/DL (ref 8.8–10.2)
CHLORIDE SERPL-SCNC: 100 MMOL/L (ref 98–107)
CREAT SERPL-MCNC: 0.89 MG/DL (ref 0.51–0.95)
DEPRECATED HCO3 PLAS-SCNC: 30 MMOL/L (ref 22–29)
EGFRCR SERPLBLD CKD-EPI 2021: 67 ML/MIN/1.73M2
GLUCOSE SERPL-MCNC: 105 MG/DL (ref 70–99)
POTASSIUM SERPL-SCNC: 4 MMOL/L (ref 3.4–5.3)
SODIUM SERPL-SCNC: 141 MMOL/L (ref 135–145)

## 2024-06-14 ENCOUNTER — TELEPHONE (OUTPATIENT)
Dept: CARDIOLOGY | Facility: CLINIC | Age: 74
End: 2024-06-14
Payer: MEDICARE

## 2024-06-14 DIAGNOSIS — I48.91 ATRIAL FIBRILLATION, UNSPECIFIED TYPE (H): Primary | ICD-10-CM

## 2024-06-14 DIAGNOSIS — I10 BENIGN ESSENTIAL HYPERTENSION: ICD-10-CM

## 2024-06-14 NOTE — LETTER
"  June 14, 2024       TO: Karen Li   [address]  [place] [address]       Dear MsAydee Guillermo,    The results of your recent Laboratory tests. Dr. Martinez said, \"the overall findings are reassuring, renal function has normalized.\"    Your ziopatch should arrive mid-July.    Dr. Martinez recommends to check an echo in 1 year. We have placed an order but you can do this through your PCP clinic if you wish.    Component      Latest Ref Rng 6/12/2024  12:00 PM   Sodium      135 - 145 mmol/L 141    Potassium      3.4 - 5.3 mmol/L 4.0    Chloride      98 - 107 mmol/L 100    Carbon Dioxide (CO2)      22 - 29 mmol/L 30 (H)    Anion Gap      7 - 15 mmol/L 11    Urea Nitrogen      8.0 - 23.0 mg/dL 12.5    Creatinine      0.51 - 0.95 mg/dL 0.89    GFR Estimate      >60 mL/min/1.73m2 67    Calcium      8.8 - 10.2 mg/dL 10.6 (H)    Glucose      70 - 99 mg/dL 105 (H)       Legend:  (H) High    Component      Latest Ref Rng 6/12/2024  12:00 PM   Sodium      135 - 145 mmol/L 141    Potassium      3.4 - 5.3 mmol/L 4.0    Chloride      98 - 107 mmol/L 100    Carbon Dioxide (CO2)      22 - 29 mmol/L 30 (H)    Anion Gap      7 - 15 mmol/L 11    Urea Nitrogen      8.0 - 23.0 mg/dL 12.5    Creatinine      0.51 - 0.95 mg/dL 0.89    GFR Estimate      >60 mL/min/1.73m2 67    Calcium      8.8 - 10.2 mg/dL 10.6 (H)    Glucose      70 - 99 mg/dL 105 (H)         Sincerely,  Team 2 R.N.s  [phone]  [facility]          "

## 2024-06-14 NOTE — RESULT ENCOUNTER NOTE
Results reviewed, please let the patient know that overall findings are reassuring, renal function has normalized. Recommend ZioPatch monitor as previously planned, routine follow up with PCP, should have a TTE done in a year (please order for patient convenience) thanks!

## 2024-06-14 NOTE — TELEPHONE ENCOUNTER
Message from Dr. Martinez re:Jona Snell MD [facility] Heart Team 2  Results reviewed, please let the patient know that overall findings are reassuring, renal function has normalized. Recommend ZioPatch monitor as previously planned, routine follow up with PCP, should have a TTE done in a year (please order for patient convenience)     Order placed for echo in 1 year    1630 attempted to contact patient to review stable bmp results. Reminded patient to expect the ziopatch in mid-July.   Reviewed that Dr. Martinez ordered a 1 year echo unless her PCP wants to manage this.    Results letter sent.

## 2024-06-19 ENCOUNTER — OFFICE VISIT (OUTPATIENT)
Dept: INTERNAL MEDICINE | Facility: CLINIC | Age: 74
End: 2024-06-19
Payer: MEDICARE

## 2024-06-19 VITALS
HEART RATE: 55 BPM | WEIGHT: 176 LBS | HEIGHT: 66 IN | SYSTOLIC BLOOD PRESSURE: 115 MMHG | DIASTOLIC BLOOD PRESSURE: 78 MMHG | BODY MASS INDEX: 28.28 KG/M2 | RESPIRATION RATE: 16 BRPM | TEMPERATURE: 97.5 F | OXYGEN SATURATION: 98 %

## 2024-06-19 DIAGNOSIS — I10 ESSENTIAL HYPERTENSION: ICD-10-CM

## 2024-06-19 DIAGNOSIS — I48.19 PERSISTENT ATRIAL FIBRILLATION (H): Primary | ICD-10-CM

## 2024-06-19 PROCEDURE — 99214 OFFICE O/P EST MOD 30 MIN: CPT | Performed by: INTERNAL MEDICINE

## 2024-06-19 RX ORDER — RESPIRATORY SYNCYTIAL VIRUS VACCINE 120MCG/0.5
0.5 KIT INTRAMUSCULAR ONCE
Qty: 1 EACH | Refills: 0 | Status: CANCELLED | OUTPATIENT
Start: 2024-06-19 | End: 2024-06-19

## 2024-06-19 NOTE — PROGRESS NOTES
Karen Li   74 year old female    Date of Visit: 6/19/2024    No chief complaint on file.    Subjective  74-year-old female with new onset atrial fibrillation, asymptomatic on May 14.  She had recovered from a respiratory illness that may have been COVID.  Her TSH and hemoglobin were normal.    She also was drinking 1 to 2 glasses of wine in the evening but is now stopped that.  She denies any daytime sleepiness and does not feel she has sleep apnea and did not want evaluation.    She does have a history of hypertension and her blood pressure was mildly high in May.  She continues on losartan 100 mg a day, amlodipine 5 mg a day and HCTZ 12.5 mg a day which was added in April.    Recently her blood pressure has been on the lower side.  Well-controlled but denies orthostasis.  No edema.    She was placed on Eliquis for atrial fibrillation and metoprolol 25 mg a day.    Heart monitor showed heart rate control averaging 86 just occasional higher heart rate spells but no symptoms of palpitations or tachycardia.    She continues to walk on a regular basis with good exertional ability and no chest pain or chest pressure.  She does 1/2-hour workout daily with stable exertional ability.  No chest pain at all.    She does have a family history of coronary disease with father and had a CT scan in 2019 with a calcium score of 255 does not had an event.  She continues on atorvastatin 80 mg.    No bleeding issues since being put on Eliquis.  No falls.    No cough or respiratory symptoms.    She met with cardiology on June 12.  Plan was for no cardioversion, plan for rate control strategy.    Heart echo showed ejection fraction 50-55% with mild to moderate mitral regurgitation and mild to moderate left atrial enlargement.    June 12 labs showed a potassium of 4.0 and creatinine of 0.89.  Blood sugar 105.  She has diet-controlled diabetes with a hemoglobin A1c level last month at 6.4%.    PMHx:  No past medical history on  "file.  PSHx:    Past Surgical History:   Procedure Laterality Date    ANKLE SURGERY Left 2002    CATARACT EXTRACTION      HYSTERECTOMY      HYSTERECTOMY TOTAL ABDOMINAL, BILATERAL SALPINGO-OOPHORECTOMY, COMBINED Bilateral 1992     Immunizations:   Immunization History   Administered Date(s) Administered    COVID-19 12+ (2023-24) (Pfizer) 01/04/2024    COVID-19 Bivalent 12+ (Pfizer) 10/12/2022    COVID-19 MONOVALENT 12+ (Pfizer) 11/02/2021    COVID-19 Vaccine ([place]) 04/09/2021    Flu, Unspecified 10/25/2007    Influenza (High Dose) 3 valent vaccine 09/24/2015, 01/24/2017, 02/01/2018, 09/11/2018, 11/19/2019    Influenza (IIV3) PF 10/25/2007, 01/27/2009    Influenza Vaccine 65+ (Fluzone HD) 01/05/2021, 10/12/2022, 12/05/2023    Influenza Vaccine, 6+MO IM (QUADRIVALENT W/PRESERVATIVES) 01/27/2009, 12/08/2009    Influenza, seasonal, injectable, PF 12/08/2009, 03/15/2011, 09/05/2012    Pneumo Conj 13-V (2010&after) 09/24/2015    Pneumococcal 23 valent 01/24/2017    Rabies Immune Globulin 07/12/2013    Rabies Vaccine 07/12/2013, 07/15/2013, 07/19/2013, 07/26/2013    Rabies, Unspecified 07/12/2013, 07/15/2013, 07/19/2013, 07/26/2013    TDAP (Adacel,Boostrix) 07/02/2007, 01/24/2017    Td (Adult), Adsorbed 08/05/1994       ROS A comprehensive review of systems was performed and was otherwise negative    Medications, allergies, and problem list were reviewed and updated    Exam  /78   Pulse 55   Temp 97.5  F (36.4  C)   Resp 16   Ht 1.674 m (5' 5.9\")   Wt 79.8 kg (176 lb)   LMP  (LMP Unknown)   SpO2 98%   BMI 28.49 kg/m    She appears well.  Her heart is irregularly irregular but rate controlled.  No edema.    Assessment/Plan  1. Persistent atrial fibrillation (H)  Asymptomatic persistent atrial fibrillation.  Underlying contributing factors are hypertension and previous alcohol use and respiratory illness before A-fib.    She does not feel she has sleep apnea and did not want sleep study.    She has not had " any chest pain or exertional symptoms and still has excellent exercise tolerance, and I am therefore not suspicious for ischemia at this time.    Proceed with rate control strategy and continue on metoprolol and Eliquis.  See patient instructions.    I still recommended the patient avoid alcohol except for occasional drink with friends of less than 1 glass.    2. Essential hypertension  Controlled after adding hydrochlorothiazide earlier this spring.  See patient instructions.  Continue current medications and follow-up in 3 months.    Diabetes is diet controlled, recheck in 3 months.    We discussed COVID vaccination and she was offered a COVID-vaccine today.  But she did have 1 just 6 months ago.  She declined COVID-vaccine today and plans to get that in the fall with her flu shot      Return in about 3 months (around 9/19/2024) for Blood pressure and heart follow-up, nonfasting.   Patient Instructions   Continue on current medications.    Proceed with the Zio patch heart monitor test this summer to monitor your heart rate.    If you do develop lightheaded dizzy spells with heart rates less than 50, contact me and you may need adjustment in your metoprolol dose.    If you develop symptoms of increasing shortness of breath or chest pain, get evaluated right away.    If you hit your head with significant trauma or see stars, you should go directly to the emergency room for head CT scan.  The blood thinner you on can increase the risk of bleeding.  If you have bleeding that does not stop, go to the emergency room.    Continue to avoid alcohol, as alcohol can promote atrial fibrillation.    Your blood pressure appears well-controlled now.  Goal blood pressure less than 135/85, but not less than 110/60.  If your blood pressure gets outside of that range more than occasionally, also contact me to consider medication changes.    See me in 3 months for nonfasting visit to recheck blood pressure and heart rhythm and  discuss how you are doing.    Get a COVID and flu shot this fall.    Repeat your heart echo in 1 year    Yanick Salazar MD, MD        Current Outpatient Medications   Medication Sig Dispense Refill    amLODIPine (NORVASC) 5 MG tablet [AMLODIPINE (NORVASC) 5 MG TABLET] TAKE 1 TABLET BY MOUTH EVERY DAY 90 tablet 3    apixaban ANTICOAGULANT (ELIQUIS) 5 MG tablet Take 1 tablet (5 mg) by mouth 2 times daily 60 tablet 5    atorvastatin (LIPITOR) 80 MG tablet [ATORVASTATIN (LIPITOR) 80 MG TABLET] TAKE 1 TABLET BY MOUTH EVERYDAY AT BEDTIME 90 tablet 2    calcium carbonate-vitamin D3 600 mg(1,500mg) -400 unit Chew [CALCIUM CARBONATE-VITAMIN D3 600 MG(1,500MG) -400 UNIT CHEW] Chew 1 tablet daily.             hydroCHLOROthiazide 12.5 MG tablet Take 1 tablet (12.5 mg) by mouth daily 90 tablet 3    losartan (COZAAR) 100 MG tablet [LOSARTAN (COZAAR) 100 MG TABLET] TAKE 1 TABLET BY MOUTH EVERY DAY 90 tablet 1    metoprolol succinate ER (TOPROL XL) 25 MG 24 hr tablet Take 1 tablet (25 mg) by mouth daily 90 tablet 3    MULTIVITAMIN ORAL [MULTIVITAMIN ORAL] Take 1 tablet by mouth daily.       No Known Allergies  Social History     Tobacco Use    Smoking status: Never     Passive exposure: Never    Smokeless tobacco: Never   Vaping Use    Vaping status: Never Used   Substance Use Topics    Alcohol use: Not Currently     Comment: Alcoholic Drinks/day: wine 2-3 glasses of wine a day    Drug use: Never             Subjective   Karen is a 74 year old, presenting for the following health issues:  No chief complaint on file.      6/19/2024    11:30 AM   Additional Questions   Roomed by Sofía BURDICK   Accompanied by zaira     History of Present Illness       She eats 2-3 servings of fruits and vegetables daily.She consumes 1 sweetened beverage(s) daily.She exercises with enough effort to increase her heart rate 10 to 19 minutes per day.  She exercises with enough effort to increase her heart rate 4 days per week.   She is taking medications  regularly.                     Objective    LMP  (LMP Unknown)   There is no height or weight on file to calculate BMI.  Physical Exam               Signed Electronically by: Yanick Salazar MD

## 2024-06-19 NOTE — PATIENT INSTRUCTIONS
Continue on current medications.    Proceed with the Zio patch heart monitor test this summer to monitor your heart rate.    If you do develop lightheaded dizzy spells with heart rates less than 50, contact me and you may need adjustment in your metoprolol dose.    If you develop symptoms of increasing shortness of breath or chest pain, get evaluated right away.    If you hit your head with significant trauma or see stars, you should go directly to the emergency room for head CT scan.  The blood thinner you on can increase the risk of bleeding.  If you have bleeding that does not stop, go to the emergency room.    Continue to avoid alcohol, as alcohol can promote atrial fibrillation.    Your blood pressure appears well-controlled now.  Goal blood pressure less than 135/85, but not less than 110/60.  If your blood pressure gets outside of that range more than occasionally, also contact me to consider medication changes.    See me in 3 months for nonfasting visit to recheck blood pressure and heart rhythm and discuss how you are doing.    Get a COVID and flu shot this fall.    Repeat your heart echo in 1 year

## 2024-07-12 ENCOUNTER — ORDERS ONLY (AUTO-RELEASED) (OUTPATIENT)
Dept: CARDIOLOGY | Facility: CLINIC | Age: 74
End: 2024-07-12
Payer: MEDICARE

## 2024-07-12 DIAGNOSIS — I48.20 CHRONIC ATRIAL FIBRILLATION (H): ICD-10-CM

## 2024-07-29 PROCEDURE — 93244 EXT ECG>48HR<7D REV&INTERPJ: CPT | Performed by: INTERNAL MEDICINE

## 2024-07-29 NOTE — RESULT ENCOUNTER NOTE
Results reviewed, HR slightly higher than preferred, recommend increasing metoprolol succinate 25mg daily to 50mg daily, and should decrease amlodipine 5mg daily to 2.5mg daily, monitor BPs at home, if BPs are getting too low or having dizziness/lightheadedness then should stop the amlodipine thanks

## 2024-07-30 ENCOUNTER — TELEPHONE (OUTPATIENT)
Dept: CARDIOLOGY | Facility: CLINIC | Age: 74
End: 2024-07-30
Payer: MEDICARE

## 2024-07-30 DIAGNOSIS — I10 ESSENTIAL HYPERTENSION: ICD-10-CM

## 2024-07-30 DIAGNOSIS — I48.20 CHRONIC ATRIAL FIBRILLATION (H): Primary | ICD-10-CM

## 2024-07-30 NOTE — TELEPHONE ENCOUNTER
----- Message from Jona Martinez sent at 7/29/2024  4:12 PM CDT -----  Results reviewed, HR slightly higher than preferred, recommend increasing metoprolol succinate 25mg daily to 50mg daily, and should decrease amlodipine 5mg daily to 2.5mg daily, monitor BPs at home, if BPs are getting too low or having dizziness/lightheadedness then should stop the amlodipine thanks       Attempted to reach patient, message left to call back to review results/recommendations.

## 2024-07-31 RX ORDER — METOPROLOL SUCCINATE 50 MG/1
50 TABLET, EXTENDED RELEASE ORAL DAILY
Qty: 90 TABLET | Refills: 3 | Status: SHIPPED | OUTPATIENT
Start: 2024-07-31

## 2024-07-31 RX ORDER — AMLODIPINE BESYLATE 5 MG/1
TABLET ORAL
COMMUNITY
Start: 2024-07-31 | End: 2024-09-24

## 2024-07-31 NOTE — TELEPHONE ENCOUNTER
Jannette:  Persistent atrial fibrillation (100% burden), average rate 94 bpm.  Occasional PVCs (~2%) and 2 brief VT runs (4 beats).    Called patient to discuss ziedwige report and Dr. Martinez's recommendation to increase the toprol XL to 50mg daily and decrease amlodipine to 2.5mg daily.  Rx escripted for new toprol Xl dose.  Patient is going to cut her current amlodipine tablets in 1/2 at home.    Patient will monitor home BP and follows closely with her internist. She will call if BP drift too low or she has symptoms of lightheadedness or dizziness and consider stopping amlodipine.

## 2024-08-17 DIAGNOSIS — I10 ESSENTIAL HYPERTENSION: ICD-10-CM

## 2024-08-19 RX ORDER — LOSARTAN POTASSIUM 100 MG/1
TABLET ORAL
Qty: 90 TABLET | Refills: 2 | Status: SHIPPED | OUTPATIENT
Start: 2024-08-19

## 2024-09-10 ENCOUNTER — LAB REQUISITION (OUTPATIENT)
Dept: LAB | Facility: CLINIC | Age: 74
End: 2024-09-10
Payer: MEDICARE

## 2024-09-10 DIAGNOSIS — R87.612 LOW GRADE SQUAMOUS INTRAEPITHELIAL LESION ON CYTOLOGIC SMEAR OF CERVIX (LGSIL): ICD-10-CM

## 2024-09-10 PROCEDURE — 87624 HPV HI-RISK TYP POOLED RSLT: CPT | Mod: ORL | Performed by: OBSTETRICS & GYNECOLOGY

## 2024-09-10 PROCEDURE — 88305 TISSUE EXAM BY PATHOLOGIST: CPT | Mod: TC,ORL | Performed by: OBSTETRICS & GYNECOLOGY

## 2024-09-10 PROCEDURE — G0145 SCR C/V CYTO,THINLAYER,RESCR: HCPCS | Mod: ORL | Performed by: OBSTETRICS & GYNECOLOGY

## 2024-09-11 LAB
HPV HR 12 DNA CVX QL NAA+PROBE: NEGATIVE
HPV16 DNA CVX QL NAA+PROBE: NEGATIVE
HPV18 DNA CVX QL NAA+PROBE: NEGATIVE
HUMAN PAPILLOMA VIRUS FINAL DIAGNOSIS: NORMAL

## 2024-09-12 LAB
PATH REPORT.COMMENTS IMP SPEC: NORMAL
PATH REPORT.COMMENTS IMP SPEC: NORMAL
PATH REPORT.FINAL DX SPEC: NORMAL
PATH REPORT.GROSS SPEC: NORMAL
PATH REPORT.MICROSCOPIC SPEC OTHER STN: NORMAL
PATH REPORT.RELEVANT HX SPEC: NORMAL
PHOTO IMAGE: NORMAL

## 2024-09-12 PROCEDURE — 88305 TISSUE EXAM BY PATHOLOGIST: CPT | Mod: 26 | Performed by: STUDENT IN AN ORGANIZED HEALTH CARE EDUCATION/TRAINING PROGRAM

## 2024-09-16 LAB
BKR AP ASSOCIATED HPV REPORT: ABNORMAL
BKR LAB AP GYN ADEQUACY: ABNORMAL
BKR LAB AP GYN INTERPRETATION: ABNORMAL
BKR LAB AP LMP: ABNORMAL
BKR LAB AP PREVIOUS ABNL DX: ABNORMAL
BKR LAB AP PREVIOUS ABNORMAL: ABNORMAL
PATH REPORT.COMMENTS IMP SPEC: ABNORMAL
PATH REPORT.COMMENTS IMP SPEC: ABNORMAL
PATH REPORT.RELEVANT HX SPEC: ABNORMAL

## 2024-09-16 PROCEDURE — 88141 CYTOPATH C/V INTERPRET: CPT | Performed by: PATHOLOGY

## 2024-09-24 ENCOUNTER — OFFICE VISIT (OUTPATIENT)
Dept: INTERNAL MEDICINE | Facility: CLINIC | Age: 74
End: 2024-09-24
Payer: MEDICARE

## 2024-09-24 VITALS
TEMPERATURE: 98 F | DIASTOLIC BLOOD PRESSURE: 80 MMHG | WEIGHT: 175 LBS | HEART RATE: 65 BPM | RESPIRATION RATE: 16 BRPM | OXYGEN SATURATION: 98 % | BODY MASS INDEX: 28.12 KG/M2 | SYSTOLIC BLOOD PRESSURE: 144 MMHG | HEIGHT: 66 IN

## 2024-09-24 DIAGNOSIS — I34.0 MITRAL VALVE INSUFFICIENCY, UNSPECIFIED ETIOLOGY: ICD-10-CM

## 2024-09-24 DIAGNOSIS — Z23 NEED FOR COVID-19 VACCINE: ICD-10-CM

## 2024-09-24 DIAGNOSIS — Z85.828 HISTORY OF SCC (SQUAMOUS CELL CARCINOMA) OF SKIN: ICD-10-CM

## 2024-09-24 DIAGNOSIS — Z23 NEED FOR IMMUNIZATION AGAINST INFLUENZA: ICD-10-CM

## 2024-09-24 DIAGNOSIS — M25.512 CHRONIC LEFT SHOULDER PAIN: ICD-10-CM

## 2024-09-24 DIAGNOSIS — E78.00 HYPERCHOLESTEROLEMIA: ICD-10-CM

## 2024-09-24 DIAGNOSIS — I48.19 PERSISTENT ATRIAL FIBRILLATION (H): ICD-10-CM

## 2024-09-24 DIAGNOSIS — G89.29 CHRONIC LEFT SHOULDER PAIN: ICD-10-CM

## 2024-09-24 DIAGNOSIS — E11.9 TYPE 2 DIABETES MELLITUS WITHOUT COMPLICATION, WITHOUT LONG-TERM CURRENT USE OF INSULIN (H): ICD-10-CM

## 2024-09-24 DIAGNOSIS — Z51.81 ENCOUNTER FOR THERAPEUTIC DRUG MONITORING: ICD-10-CM

## 2024-09-24 DIAGNOSIS — I10 ESSENTIAL HYPERTENSION: Primary | ICD-10-CM

## 2024-09-24 LAB
ALBUMIN SERPL BCG-MCNC: 4.8 G/DL (ref 3.5–5.2)
ALP SERPL-CCNC: 68 U/L (ref 40–150)
ALT SERPL W P-5'-P-CCNC: 28 U/L (ref 0–50)
ANION GAP SERPL CALCULATED.3IONS-SCNC: 15 MMOL/L (ref 7–15)
AST SERPL W P-5'-P-CCNC: 36 U/L (ref 0–45)
BILIRUB SERPL-MCNC: 0.4 MG/DL
BUN SERPL-MCNC: 16.7 MG/DL (ref 8–23)
CALCIUM SERPL-MCNC: 10.7 MG/DL (ref 8.8–10.4)
CHLORIDE SERPL-SCNC: 98 MMOL/L (ref 98–107)
CREAT SERPL-MCNC: 0.88 MG/DL (ref 0.51–0.95)
EGFRCR SERPLBLD CKD-EPI 2021: 68 ML/MIN/1.73M2
EST. AVERAGE GLUCOSE BLD GHB EST-MCNC: 137 MG/DL
GLUCOSE SERPL-MCNC: 94 MG/DL (ref 70–99)
HBA1C MFR BLD: 6.4 % (ref 0–5.6)
HCO3 SERPL-SCNC: 27 MMOL/L (ref 22–29)
LDLC SERPL DIRECT ASSAY-MCNC: 92 MG/DL
POTASSIUM SERPL-SCNC: 3.8 MMOL/L (ref 3.4–5.3)
PROT SERPL-MCNC: 8.1 G/DL (ref 6.4–8.3)
SODIUM SERPL-SCNC: 140 MMOL/L (ref 135–145)

## 2024-09-24 PROCEDURE — 90662 IIV NO PRSV INCREASED AG IM: CPT | Performed by: INTERNAL MEDICINE

## 2024-09-24 PROCEDURE — 36415 COLL VENOUS BLD VENIPUNCTURE: CPT | Performed by: INTERNAL MEDICINE

## 2024-09-24 PROCEDURE — 90480 ADMN SARSCOV2 VAC 1/ONLY CMP: CPT | Performed by: INTERNAL MEDICINE

## 2024-09-24 PROCEDURE — 91320 SARSCV2 VAC 30MCG TRS-SUC IM: CPT | Performed by: INTERNAL MEDICINE

## 2024-09-24 PROCEDURE — G0008 ADMIN INFLUENZA VIRUS VAC: HCPCS | Performed by: INTERNAL MEDICINE

## 2024-09-24 PROCEDURE — 83036 HEMOGLOBIN GLYCOSYLATED A1C: CPT | Performed by: INTERNAL MEDICINE

## 2024-09-24 PROCEDURE — 80053 COMPREHEN METABOLIC PANEL: CPT | Performed by: INTERNAL MEDICINE

## 2024-09-24 PROCEDURE — 99214 OFFICE O/P EST MOD 30 MIN: CPT | Mod: 25 | Performed by: INTERNAL MEDICINE

## 2024-09-24 PROCEDURE — 83721 ASSAY OF BLOOD LIPOPROTEIN: CPT | Performed by: INTERNAL MEDICINE

## 2024-09-24 RX ORDER — AMLODIPINE BESYLATE 2.5 MG/1
2.5 TABLET ORAL DAILY
Qty: 90 TABLET | Refills: 3 | Status: SHIPPED | OUTPATIENT
Start: 2024-09-24

## 2024-09-24 NOTE — PATIENT INSTRUCTIONS
Continue current medications.    If your blood pressure is running above 140/85 at home, you can increase the amlodipine back to 5 mg a day.    A new prescription for 2.5 mg sized amlodipine has been sent to your pharmacy.    Your left shoulder pain is consistent with a biceps or supraspinatus tendinitis.  Avoid strain type activity.  See physical therapy for some daily range of motion and strengthening exercises that you can do.  Do not take any ibuprofen or Advil or Aleve pain medicine.    Your mammogram will be due after November 14.    Follow-up in the spring for an adult wellness visit physical exam and/or blood pressure and diabetes checkup appointment.

## 2024-09-24 NOTE — LETTER
September 25, 2024      Karen Li  [address]  [place] [address]        Dear ,    We are writing to inform you of your test results.    LDL cholesterol level is improved, and below goal of less than 100.  Continue current atorvastatin.    Kidney and liver tests are normal.  Calcium level is okay.    Hemoglobin A1c level is 6.4% shows excellent control of diabetes.    Labs look good.  No change in treatment plan.    Resulted Orders   LDL cholesterol direct   Result Value Ref Range    LDL Cholesterol Direct 92 <100 mg/dL      Comment:      Age 2-19 years:  Desirable: < 110 mg/dL   Borderline High: 110-129 mg/dL   High: >= 130 mg/dL    Age 20 years and older:  Desirable: < 100 mg/dL  Above Desirable: 100-129 mg/dL   Borderline High: 130-159 mg/dL   High: 160-189 mg/dL   Very High: >= 190 mg/dL   Comprehensive metabolic panel   Result Value Ref Range    Sodium 140 135 - 145 mmol/L    Potassium 3.8 3.4 - 5.3 mmol/L    Carbon Dioxide (CO2) 27 22 - 29 mmol/L    Anion Gap 15 7 - 15 mmol/L    Urea Nitrogen 16.7 8.0 - 23.0 mg/dL    Creatinine 0.88 0.51 - 0.95 mg/dL    GFR Estimate 68 >60 mL/min/1.73m2      Comment:      eGFR calculated using 2021 CKD-EPI equation.    Calcium 10.7 (H) 8.8 - 10.4 mg/dL      Comment:      Reference intervals for this test were updated on 7/16/2024 to reflect our healthy population more accurately. There may be differences in the flagging of prior results with similar values performed with this method. Those prior results can be interpreted in the context of the updated reference intervals.    Chloride 98 98 - 107 mmol/L    Glucose 94 70 - 99 mg/dL    Alkaline Phosphatase 68 40 - 150 U/L    AST 36 0 - 45 U/L    ALT 28 0 - 50 U/L    Protein Total 8.1 6.4 - 8.3 g/dL    Albumin 4.8 3.5 - 5.2 g/dL    Bilirubin Total 0.4 <=1.2 mg/dL   Hemoglobin A1c   Result Value Ref Range    Estimated Average Glucose 137 (H) <117 mg/dL    Hemoglobin A1C 6.4 (H) 0.0 - 5.6 %      Comment:      Normal <5.7%    Prediabetes 5.7-6.4%    Diabetes 6.5% or higher     Note: Adopted from ADA consensus guidelines.       If you have any questions or concerns, please call the clinic at the number listed above.       Sincerely,      Yanick Salazar MD

## 2024-09-24 NOTE — PROGRESS NOTES
Karen Li   74 year old female    Date of Visit: 9/24/2024    Chief Complaint   Patient presents with    Follow Up     3 mo BP check. NOT fasting    Shoulder Pain     Lt shoulder pain x 1 yr not getting better.     Subjective  74-year-old female is here for routine checkup on blood pressure and diabetes and her other medical conditions.    She had new onset atrial fibrillation after COVID in May of this year.  No symptoms.  She remains in atrial fibrillation with rate control strategy on Toprol-XL 50 mg a day and Eliquis.  No bleeding issues.  No falls.  No history of TIA or stroke symptoms.    She was drinking wine in the evening and she has cut down but still drinks intermittently.  He denies daytime sleepiness or morning headaches and does not feel she has sleep apnea.    Her blood pressure has been under adequate control in the 130s/80s.  Cardiology had reduced her amlodipine down to 2.5 mg a day when they increased the metoprolol.  She still on HCTZ 12.5 mg a day which did help bring down her blood pressure this past spring.  Still on losartan 100 mg a day.    May 2024 heart echo with ejection fraction 50-55% with mild to moderate mitral regurgitation and mild to moderate left atrial enlargement.  Zio patch in July showed an average heart rate of 94 with 100% atrial fibrillation.    No chest pain or chest pressure.  2019 cardiac CT scan calcium score of 255 but she has not had an event.  2019 carotid ultrasound with mild plaque only.  Family history of heart disease with father.  She is on atorvastatin 80 mg a day without generalized myalgias and no history of liver problems.    Is never smoked.    Diabetes is diet controlled and does not check blood sugars.  Hemoglobin A1c level in May was 6.4%.  Normal TSH at that time.    She is on 80 mg of atorvastatin and denies missed doses.  But her LDL was 107 last May.    March 2024 colonoscopy was negative and no further plan with age.    Status post  EMERGENCY DEPARTMENT HISTORY AND PHYSICAL EXAM 
 
Date: 9/28/2018 Patient Name: Sasha Aly History of Presenting Illness Chief Complaint Patient presents with  Abdominal Pain  
  lower abdominal pain x 3 days, denies N/V/D.  Vaginal Discharge  
  white vaginal discharge x 3 days, +itching and odor. History Provided By: Patient Chief Complaint: abdominal pain Duration: 3 Days Timing:  Acute Location: lower abdomen suprapubic Quality: Aching Severity: 4 out of 10 Modifying Factors: none Associated Symptoms: vaginal discharge HPI: Sasha Aly is a 27 y.o. female with a PMH of No significant past medical history who presents with lower abdominal pain and vaginal discharge for 3 days reportsHX BV . Denies concern for STD denies dysuria fever n/v/d. PCP: Bethanie Soto MD 
 
Current Outpatient Prescriptions Medication Sig Dispense Refill  metroNIDAZOLE (FLAGYL) 500 mg tablet Take 1 Tab by mouth two (2) times a day for 7 days. 14 Tab 0  
 PRENATAL VIT/FE FUMARATE/FA (PRENATAL PO) Take  by mouth. Past History Past Medical History: 
History reviewed. No pertinent past medical history. Past Surgical History: 
History reviewed. No pertinent surgical history. Family History: 
History reviewed. No pertinent family history. Social History: 
Social History Substance Use Topics  Smoking status: Never Smoker  Smokeless tobacco: Never Used  Alcohol use No  
 
 
Allergies: Allergies Allergen Reactions  Flagyl [Metronidazole] Hives Review of Systems Review of Systems Constitutional: Negative for fatigue and fever. Respiratory: Negative for shortness of breath and wheezing. Cardiovascular: Negative for chest pain and palpitations. Gastrointestinal: Positive for abdominal pain. Genitourinary: Positive for vaginal discharge. Negative for dysuria, flank pain and frequency. Musculoskeletal: Negative for arthralgias, myalgias, neck pain and neck stiffness. Skin: Negative for pallor and rash. Neurological: Negative for dizziness, tremors, weakness and headaches. Hematological: Negative for adenopathy. All other systems reviewed and are negative. Physical Exam  
 
Vitals:  
 09/28/18 1658 BP: 91/64 Pulse: 86 Resp: 18 Temp: 98.6 °F (37 °C) SpO2: 98% Weight: 48.5 kg (107 lb) Height: 5' 5\" (1.651 m) Physical Exam  
Constitutional: She is oriented to person, place, and time. She appears well-developed and well-nourished. No distress. HENT:  
Head: Normocephalic and atraumatic. Right Ear: External ear normal.  
Left Ear: External ear normal.  
Nose: Nose normal.  
Mouth/Throat: Oropharynx is clear and moist.  
Eyes: Conjunctivae are normal.  
Neck: Normal range of motion. Neck supple. Cardiovascular: Normal rate, regular rhythm and normal heart sounds. Pulmonary/Chest: Effort normal and breath sounds normal. No respiratory distress. She has no wheezes. Abdominal: Soft. Bowel sounds are normal. There is no tenderness. Musculoskeletal: Normal range of motion. Lymphadenopathy:  
  She has no cervical adenopathy. Neurological: She is alert and oriented to person, place, and time. No cranial nerve deficit. Coordination normal.  
Skin: Skin is warm and dry. No rash noted. Psychiatric: She has a normal mood and affect. Her behavior is normal. Judgment and thought content normal.  
Nursing note and vitals reviewed. Patient self swabbed to obtain cultures Diagnostic Study Results Labs - Recent Results (from the past 12 hour(s)) URINALYSIS W/ REFLEX CULTURE Collection Time: 09/28/18  5:12 PM  
Result Value Ref Range Color YELLOW/STRAW Appearance CLEAR CLEAR Specific gravity 1.015 1.003 - 1.030    
 pH (UA) 8.0 5.0 - 8.0 Protein NEGATIVE  NEG mg/dL Glucose NEGATIVE  NEG mg/dL Ketone NEGATIVE  NEG mg/dL hysterectomy.  She has had a low-grade dysplasia but negative HPV.  She just saw gynecology earlier this month with a plan to recheck in 6 months.  She had a colposcopy which she reported no new findings.    November 2023 mammogram was negative.    History of squamous cell cancer but she does state that she was seen in the spring by dermatology and no new skin lesions.    Was seen by ophthalmology earlier this year for glaucoma suspect monitoring but no new findings and gets seen every 6 months.    New complaint of left shoulder pain over the past year with movements such as reaching behind her and putting on her bra.  No precipitating injury.  She can still lift her arm all the way up.  Pain is at the proximal biceps area with certain movements.  It is not exertional.  There is no numbness or tingling of her hand.    1 year anniversary since the death of her     PMHx:  No past medical history on file.  PSHx:    Past Surgical History:   Procedure Laterality Date    ANKLE SURGERY Left 2002    CATARACT EXTRACTION      HYSTERECTOMY      HYSTERECTOMY TOTAL ABDOMINAL, BILATERAL SALPINGO-OOPHORECTOMY, COMBINED Bilateral 1992     Immunizations:   Immunization History   Administered Date(s) Administered    COVID-19 12+ (Pfizer) 01/04/2024    COVID-19 Bivalent 12+ (Pfizer) 10/12/2022    COVID-19 MONOVALENT 12+ (Pfizer) 11/02/2021    COVID-19 Vaccine ([place]) 04/09/2021    Flu, Unspecified 10/25/2007    Influenza (High Dose) Trivalent,PF (Fluzone) 09/24/2015, 01/24/2017, 02/01/2018, 09/11/2018, 11/19/2019    Influenza (IIV3) PF 10/25/2007, 01/27/2009    Influenza Vaccine 65+ (Fluzone HD) 01/05/2021, 10/12/2022, 12/05/2023    Influenza Vaccine, 6+MO IM (QUADRIVALENT W/PRESERVATIVES) 01/27/2009, 12/08/2009    Influenza, seasonal, injectable, PF 12/08/2009, 03/15/2011, 09/05/2012    Pneumo Conj 13-V (2010&after) 09/24/2015    Pneumococcal 23 valent 01/24/2017    Rabies Immune Globulin 07/12/2013    Rabies Vaccine  "07/12/2013, 07/15/2013, 07/19/2013, 07/26/2013    Rabies, Unspecified 07/12/2013, 07/15/2013, 07/19/2013, 07/26/2013    TDAP (Adacel,Boostrix) 07/02/2007, 01/24/2017    Td (Adult), Adsorbed 08/05/1994       ROS A comprehensive review of systems was performed and was otherwise negative    Medications, allergies, and problem list were reviewed and updated    Exam  BP (!) 144/80   Pulse 65   Temp 98  F (36.7  C)   Resp 16   Ht 1.674 m (5' 5.9\")   Wt 79.4 kg (175 lb)   LMP  (LMP Unknown)   SpO2 98%   BMI 28.33 kg/m    Appears well.  Able to climb up on exam table.  No jaundice.  Lungs are clear.  Heart is irregularly irregular but heart rate controlled.  No murmur or gallop.  Abdomen is nontender and no ankle edema.  He does have some mild tenderness over her biceps tendon area but there is really no tenderness today and good range of motion of the shoulder.  She can do her arm all the way up.  There is no bunching up of the biceps tendon.    Assessment/Plan  1. Essential hypertension  Borderline high systolic but has been adequately controlled at home.  She was told to go back up to 5 mg a day amlodipine if her blood pressure is running above 140/85 more regularly.  Continue current medications at this time with losartan, HCTZ, Toprol-XL and amlodipine  - amLODIPine (NORVASC) 2.5 MG tablet; Take 1 tablet (2.5 mg) by mouth daily. Take 2.5mg (1/2 tab) daily  Dispense: 90 tablet; Refill: 3    2. Persistent atrial fibrillation (H)  Rate controlled with metoprolol.  On Eliquis long-term.  Rate control strategy.    She does not feel she has underlying sleep apnea.  She was told to minimize her alcohol    3. Mitral valve insufficiency, unspecified etiology  Repeat heart echo in May of next year for 1 year follow-up.  I did not hear murmur.    4. Type 2 diabetes mellitus without complication, without long-term current use of insulin (H)  Diet controlled.  Can consider metformin if needed in the future.    Get seen " Bilirubin NEGATIVE  NEG Blood NEGATIVE  NEG Urobilinogen 1.0 0.2 - 1.0 EU/dL Nitrites NEGATIVE  NEG Leukocyte Esterase NEGATIVE  NEG    
 WBC 0-4 0 - 4 /hpf  
 RBC 0-5 0 - 5 /hpf Epithelial cells MODERATE (A) FEW /lpf Bacteria NEGATIVE  NEG /hpf  
 UA:UC IF INDICATED CULTURE NOT INDICATED BY UA RESULT CNI Mucus 1+ (A) NEG /lpf  
HCG URINE, QL. - POC Collection Time: 09/28/18  5:15 PM  
Result Value Ref Range Pregnancy test,urine (POC) NEGATIVE  NEG    
KOH, OTHER SOURCES Collection Time: 09/28/18  6:10 PM  
Result Value Ref Range Special Requests: NO SPECIAL REQUESTS    
 KOH NO YEAST SEEN    
WET PREP Collection Time: 09/28/18  6:10 PM  
Result Value Ref Range Clue cells CLUE CELLS PRESENT Wet prep NO TRICHOMONAS SEEN Radiologic Studies - No orders to display CT Results  (Last 48 hours) None CXR Results  (Last 48 hours) None Medical Decision Making I am the first provider for this patient. I reviewed the vital signs, available nursing notes, past medical history, past surgical history, family history and social history. Vital Signs-Reviewed the patient's vital signs. Records Reviewed: Nursing Notes ED Course:  
Stable Disposition: 
home DISCHARGE NOTE:  
 
 
  Care plan outlined and precautions discussed. Patient has no new complaints, changes, or physical findings. Results of tests were reviewed with the patient. All medications were reviewed with the patient; will d/c home with flagyl. All of pt's questions and concerns were addressed. Patient was instructed and agrees to follow up with PCP, as well as to return to the ED upon further deterioration. Patient is ready to go home. Follow-up Information Follow up With Details Comments Contact Info Mary Lepe MD In 2 days  [facility] 1st Floor [facility] [phone] Discharge Medication List as of 9/28/2018  6:42 PM  
  
 
 
Provider Notes (Medical Decision Making): DDX UTI BV candidiasis Procedures: 
Procedures Diagnosis Clinical Impression: 1. BV (bacterial vaginosis) every 6 months for glaucoma suspect  - Hemoglobin A1c    5. History of SCC (squamous cell carcinoma) of skin  No recurrence, saw dermatology this past spring    6. Encounter for therapeutic drug monitoring    - Comprehensive metabolic panel    7. Need for COVID-19 vaccine  Given today  - COVID-19 12+ (PFIZER)    8. Need for immunization against influenza  Given today  - INFLUENZA HIGH DOSE, TRIVALENT, PF (FLUZONE)    9. Hypercholesterolemia  Goal LDL less than 100, but on 80 mg of atorvastatin and plan to continue that same dose  - LDL cholesterol direct    10. Chronic left shoulder pain  Consistent with a biceps tendinitis.  Avoid strain activity.  Refer to physical therapy.  I did discuss an orthopedic referral if she is not getting better and desires further evaluation.  - Physical Therapy  Referral; Future    CARMENZA-1 of residual cervix, after hysterectomy.  Gets seen by gynecology every 6 months with regular colposcopies, and was just evaluated earlier this month.    She was reminded to get her mammogram done in November    DEXA scan was normal in February of this year    The longitudinal plan of care for the diagnosis(es)/condition(s) as documented were addressed during this visit. Due to the added complexity in care, I will continue to support Karen in the subsequent management and with ongoing continuity of care.        Return in about 6 months (around 3/24/2025) for Adult wellness visit physical exam and/or blood pressure and diabetes follow-up..   Patient Instructions   Continue current medications.    If your blood pressure is running above 140/85 at home, you can increase the amlodipine back to 5 mg a day.    A new prescription for 2.5 mg sized amlodipine has been sent to your pharmacy.    Your left shoulder pain is consistent with a biceps or supraspinatus tendinitis.  Avoid strain type activity.  See physical therapy for some daily range of motion and strengthening exercises that you can do.  Do  not take any ibuprofen or Advil or Aleve pain medicine.    Your mammogram will be due after November 14.    Follow-up in the spring for an adult wellness visit physical exam and/or blood pressure and diabetes checkup appointment.        Yanick Salazar MD, MD        Current Outpatient Medications   Medication Sig Dispense Refill    amLODIPine (NORVASC) 2.5 MG tablet Take 1 tablet (2.5 mg) by mouth daily. Take 2.5mg (1/2 tab) daily 90 tablet 3    apixaban ANTICOAGULANT (ELIQUIS) 5 MG tablet Take 1 tablet (5 mg) by mouth 2 times daily 60 tablet 5    atorvastatin (LIPITOR) 80 MG tablet [ATORVASTATIN (LIPITOR) 80 MG TABLET] TAKE 1 TABLET BY MOUTH EVERYDAY AT BEDTIME 90 tablet 2    calcium carbonate-vitamin D3 600 mg(1,500mg) -400 unit Chew [CALCIUM CARBONATE-VITAMIN D3 600 MG(1,500MG) -400 UNIT CHEW] Chew 1 tablet daily.             hydroCHLOROthiazide 12.5 MG tablet Take 1 tablet (12.5 mg) by mouth daily 90 tablet 3    losartan (COZAAR) 100 MG tablet TAKE 1 TABLET BY MOUTH EVERY DAY 90 tablet 2    metoprolol succinate ER (TOPROL XL) 50 MG 24 hr tablet Take 1 tablet (50 mg) by mouth daily 90 tablet 3    MULTIVITAMIN ORAL [MULTIVITAMIN ORAL] Take 1 tablet by mouth daily.       No Known Allergies  Social History     Tobacco Use    Smoking status: Never     Passive exposure: Never    Smokeless tobacco: Never   Vaping Use    Vaping status: Never Used   Substance Use Topics    Alcohol use: Not Currently     Comment: Alcoholic Drinks/day: wine 2-3 glasses of wine a day    Drug use: Never             Subjective   Karen is a 74 year old, presenting for the following health issues:  Follow Up (3 mo BP check. NOT fasting) and Shoulder Pain (Lt shoulder pain x 1 yr not getting better.)        9/24/2024     1:39 PM   Additional Questions   Roomed by Sofía BURDICK   Accompanied by zaira     Shoulder Pain    History of Present Illness       Reason for visit:  FOLLOW UP    She eats 4 or more servings of fruits and vegetables daily.She consumes 0  "sweetened beverage(s) daily.She exercises with enough effort to increase her heart rate 30 to 60 minutes per day.  She exercises with enough effort to increase her heart rate 5 days per week.   She is taking medications regularly.                     Objective    BP (!) 144/80   Pulse 65   Ht 1.674 m (5' 5.9\")   Wt 79.4 kg (175 lb)   LMP  (LMP Unknown)   SpO2 98%   BMI 28.33 kg/m    Body mass index is 28.33 kg/m .  Physical Exam               Signed Electronically by: Yanick Salazar MD    "

## 2024-10-14 NOTE — PROGRESS NOTES
PHYSICAL THERAPY EVALUATION  Type of Visit: Evaluation     Fall Risk Screen:  Fall screen completed by: PT  Have you fallen 2 or more times in the past year?: No  Have you fallen and had an injury in the past year?: No  Is patient a fall risk?: No    Subjective       Presenting condition or subjective complaint:    Date of onset: 09/24/24    Relevant medical history: Heart problems; High blood pressure   Dates & types of surgery:      Prior diagnostic imaging/testing results:       Prior therapy history for the same diagnosis, illness or injury:        Pt is a 74 year old female presenting with complaints of left shoulder pain. Pt does have a hx significant for A-fib after having Covid in May - stays in A-fib but is on Toprol-XL and Eliquis. Pt does not report any FARSHAD for shoulder pain - pain is reported around proximal biceps. Pt denies any numbness/tingling. Pt reports that she put of taking care of her shoulder due to other medical issues.    The symptoms started about 1 year ago and is getting worse over the last month.    Pt describes the pain as sharp and short-lived - intermittently sore but usually does not have pain at rest.     Aggravating Factors: reaching/lifting, reaching behind to put on bra, reaching cross body    Alleviating Factors: resting it    Prior treatments: not for shoulder but has had PT in the past    Sleep Quality: not affected by pain, though uncomfortable to lie on left shoulder    Red Flags: none     Pt goals: move arm more freely, blow dry hair, reach up    PMH: essential HTN, persistent A-fib, mitral valve insufficiency, Type 2 diabetes mellitus without complication without long-term current use of insulin (diet controlled), history of squamous cell carcinoma of skin, Hypercholesterolemia (no PMH noted on file but taken from referring provider's note)      Living Environment  Social support: Alone   Type of home: House; 2-story; Basement   Stairs to enter the home: Yes   Is there a  railing: Yes     Ramp: No   Stairs inside the home: Yes   Is there a railing: Yes     Help at home: None  Equipment owned:       Employment: No    Hobbies/Interests:      Patient goals for therapy:       Objective   SHOULDER:    Posture: forward shoulders/rounded shoulders    Shoulder ROM (* Denotes Pain):   Left (AROM/PROM) Right (AROM/PROM)   Flexion 105* / min limitation* 132   Scaption 105* / min limitation* 130   Extension     ER (@0) 75* 65   ER (@90)     IR (@0) L5 / WFL* T7   IR (@ 90)       End Feels: empty towards end range    Shoulder Strength (* Denotes Pain): NT today due to limited ROM   Left  Right    Flexion     Scaption     Extension     ER (@0)     ER (@90)     IR (@0)     IR (@ 90)       Isometrics: ER, IR: strong and without pain B; flexion and ABD strong but painful     Palpation: no significant TTP noted around shoulder or neck    Special tests:   Left  Right    Impingement     Neer - -   Araiza Evert + -   Coracoid Impingement     Posterior Impingement     O'Brnicole     RC Tear     Drop Arm     ER Lag Painful in position -   Lift Off     Belly Press     Empty Can (impingement)/Full Can (RC)     Labrum     O'Julius     Crank     Dynamic Labral Shear      Crossover Test     Compression-Rotation     Biceps     Speed's     Yergason's     AC Joint     Crossover     Shear     Instability      Apprehension + Relocation      Load and Shift     Sulcus               GH/Capsular Mobility  L  R   Posterior glide Mod limitation Min limitation   Inferior glide Min limitation WFL   Anterior glide WFL WFL        Assessment & Plan   CLINICAL IMPRESSIONS  Medical Diagnosis: Chronic left shoulder pain    Treatment Diagnosis: Chronic left shoulder pain   Impression/Assessment: Patient is a 74 year old female with left shoulder pain complaints.  The following significant findings have been identified: Pain, Decreased ROM/flexibility, Decreased joint mobility, Decreased strength, Impaired muscle performance, and  Decreased activity tolerance. These impairments interfere with their ability to perform self care tasks, recreational activities, household chores, and driving  as compared to previous level of function.     Clinical Decision Making (Complexity):  Clinical Presentation: Stable/Uncomplicated  Clinical Presentation Rationale: based on medical and personal factors listed in PT evaluation  Clinical Decision Making (Complexity): Low complexity    PLAN OF CARE  Treatment Interventions:  Modalities: Cryotherapy, Hot Pack, Ultrasound  Interventions: Manual Therapy, Neuromuscular Re-education, Therapeutic Activity, Therapeutic Exercise, Self-Care/Home Management    Long Term Goals     PT Goal 1  Goal Identifier: Reaching  Goal Description: Pt will be able to reach to at least 130 degrees without a significant increase in pain in order to improve overall function  Rationale: to maximize safety and independence with performance of ADLs and functional tasks;to maximize safety and independence within the home;to maximize safety and independence with self cares  Target Date: 12/11/24  PT Goal 2  Goal Identifier: Reaching behind back  Goal Description: Pt will be able to reach behind back without a significant increase in pain in order to dress/undress  Rationale: to maximize safety and independence with self cares;to maximize safety and independence with performance of ADLs and functional tasks  Target Date: 12/25/24  PT Goal 3  Goal Identifier: Cross body  Goal Description: Pt will be able to lift at least 3 pounds across body without being limited by pain in order to improve ability to blow dry hair  Rationale: to maximize safety and independence with performance of ADLs and functional tasks;to maximize safety and independence with self cares  Target Date: 01/08/25      Frequency of Treatment: 1x/week, decreasing to every other as appropriate  Duration of Treatment: 12 weeks    Recommended Referrals to Other Professionals:   none  Education Assessment:   Learner/Method: Patient    Risks and benefits of evaluation/treatment have been explained.   Patient/Family/caregiver agrees with Plan of Care.     Evaluation Time:     PT Eval, Low Complexity Minutes (35275): 20     Signing Clinician: JOANNE Bermudez [facility]                                                                                   OUTPATIENT PHYSICAL THERAPY      PLAN OF TREATMENT FOR OUTPATIENT REHABILITATION   Patient's Last Name, First Name, Karen Kraus YOB: 1950   Provider's Name   [facility]   Medical Record No.  [id number]     Onset Date: 09/24/24  Start of Care Date: 10/16/24     Medical Diagnosis:  Chronic left shoulder pain      PT Treatment Diagnosis:  Chronic left shoulder pain Plan of Treatment  Frequency/Duration: 1x/week, decreasing to every other as appropriate/ 12 weeks    Certification date from 10/16/24 to 01/08/25         See note for plan of treatment details and functional goals     Ruth Ann Snell PT                         I CERTIFY THE NEED FOR THESE SERVICES FURNISHED UNDER        THIS PLAN OF TREATMENT AND WHILE UNDER MY CARE     (Physician attestation of this document indicates review and certification of the therapy plan).              Referring Provider:  Yanick Salazar    Initial Assessment  See Epic Evaluation- Start of Care Date: 10/16/24

## 2024-10-16 ENCOUNTER — THERAPY VISIT (OUTPATIENT)
Dept: PHYSICAL THERAPY | Facility: CLINIC | Age: 74
End: 2024-10-16
Attending: INTERNAL MEDICINE
Payer: MEDICARE

## 2024-10-16 DIAGNOSIS — M25.512 CHRONIC LEFT SHOULDER PAIN: ICD-10-CM

## 2024-10-16 DIAGNOSIS — G89.29 CHRONIC LEFT SHOULDER PAIN: ICD-10-CM

## 2024-10-16 PROCEDURE — 97110 THERAPEUTIC EXERCISES: CPT | Mod: GP

## 2024-10-16 PROCEDURE — 97161 PT EVAL LOW COMPLEX 20 MIN: CPT | Mod: GP

## 2024-10-16 ASSESSMENT — ACTIVITIES OF DAILY LIVING (ADL)
CARRYING_A_HEAVY_OBJECT_OF_10_POUNDS: 2
WHEN_LYING_ON_THE_INVOLVED_SIDE: 2
PLEASE_INDICATE_YOR_PRIMARY_REASON_FOR_REFERRAL_TO_THERAPY:: SHOULDER
AT_ITS_WORST?: 4
WASHING_YOUR_HAIR?: 2
PUTTING_ON_YOUR_PANTS: 0
WASHING_YOUR_BACK: 5
PUTTING_ON_AN_UNDERSHIRT_OR_A_PULLOVER_SWEATER: 3
PUSHING_WITH_THE_INVOLVED_ARM: 2
PLACING_AN_OBJECT_ON_A_HIGH_SHELF: 3
REACHING_FOR_SOMETHING_ON_A_HIGH_SHELF: 4
REMOVING_SOMETHING_FROM_YOUR_BACK_POCKET: 3
PUTTING_ON_A_SHIRT_THAT_BUTTONS_DOWN_THE_FRONT: 0
TOUCHING_THE_BACK_OF_YOUR_NECK: 2

## 2024-10-30 ENCOUNTER — THERAPY VISIT (OUTPATIENT)
Dept: PHYSICAL THERAPY | Facility: CLINIC | Age: 74
End: 2024-10-30
Payer: MEDICARE

## 2024-10-30 DIAGNOSIS — G89.29 CHRONIC LEFT SHOULDER PAIN: Primary | ICD-10-CM

## 2024-10-30 DIAGNOSIS — M25.512 CHRONIC LEFT SHOULDER PAIN: Primary | ICD-10-CM

## 2024-10-30 PROCEDURE — 97110 THERAPEUTIC EXERCISES: CPT | Mod: GP | Performed by: PHYSICAL THERAPIST

## 2024-10-30 PROCEDURE — 97140 MANUAL THERAPY 1/> REGIONS: CPT | Mod: GP | Performed by: PHYSICAL THERAPIST

## 2024-11-06 ENCOUNTER — THERAPY VISIT (OUTPATIENT)
Dept: PHYSICAL THERAPY | Facility: CLINIC | Age: 74
End: 2024-11-06
Payer: MEDICARE

## 2024-11-06 DIAGNOSIS — G89.29 CHRONIC LEFT SHOULDER PAIN: Primary | ICD-10-CM

## 2024-11-06 DIAGNOSIS — M25.512 CHRONIC LEFT SHOULDER PAIN: Primary | ICD-10-CM

## 2024-11-06 PROCEDURE — 97140 MANUAL THERAPY 1/> REGIONS: CPT | Mod: GP | Performed by: PHYSICAL THERAPIST

## 2024-11-06 PROCEDURE — 97110 THERAPEUTIC EXERCISES: CPT | Mod: GP | Performed by: PHYSICAL THERAPIST

## 2024-11-10 DIAGNOSIS — I48.91 ATRIAL FIBRILLATION, UNSPECIFIED TYPE (H): ICD-10-CM

## 2024-11-11 RX ORDER — APIXABAN 5 MG/1
5 TABLET, FILM COATED ORAL 2 TIMES DAILY
Qty: 60 TABLET | Refills: 5 | Status: SHIPPED | OUTPATIENT
Start: 2024-11-11

## 2024-11-13 ENCOUNTER — THERAPY VISIT (OUTPATIENT)
Dept: PHYSICAL THERAPY | Facility: CLINIC | Age: 74
End: 2024-11-13
Payer: MEDICARE

## 2024-11-13 DIAGNOSIS — M25.512 CHRONIC LEFT SHOULDER PAIN: Primary | ICD-10-CM

## 2024-11-13 DIAGNOSIS — G89.29 CHRONIC LEFT SHOULDER PAIN: Primary | ICD-10-CM

## 2024-11-13 PROCEDURE — 97140 MANUAL THERAPY 1/> REGIONS: CPT | Mod: GP | Performed by: PHYSICAL THERAPIST

## 2024-11-13 PROCEDURE — 97110 THERAPEUTIC EXERCISES: CPT | Mod: GP | Performed by: PHYSICAL THERAPIST

## 2024-11-20 ENCOUNTER — THERAPY VISIT (OUTPATIENT)
Dept: PHYSICAL THERAPY | Facility: CLINIC | Age: 74
End: 2024-11-20
Payer: MEDICARE

## 2024-11-20 DIAGNOSIS — M25.512 CHRONIC LEFT SHOULDER PAIN: Primary | ICD-10-CM

## 2024-11-20 DIAGNOSIS — G89.29 CHRONIC LEFT SHOULDER PAIN: Primary | ICD-10-CM

## 2024-11-20 PROCEDURE — 97140 MANUAL THERAPY 1/> REGIONS: CPT | Mod: GP | Performed by: PHYSICAL THERAPIST

## 2024-11-20 PROCEDURE — 97110 THERAPEUTIC EXERCISES: CPT | Mod: GP | Performed by: PHYSICAL THERAPIST

## 2025-01-06 ENCOUNTER — THERAPY VISIT (OUTPATIENT)
Dept: PHYSICAL THERAPY | Facility: CLINIC | Age: 75
End: 2025-01-06
Payer: MEDICARE

## 2025-01-06 DIAGNOSIS — M25.512 CHRONIC LEFT SHOULDER PAIN: Primary | ICD-10-CM

## 2025-01-06 DIAGNOSIS — G89.29 CHRONIC LEFT SHOULDER PAIN: Primary | ICD-10-CM

## 2025-01-06 PROCEDURE — 97110 THERAPEUTIC EXERCISES: CPT | Mod: GP | Performed by: PHYSICAL THERAPIST

## 2025-01-06 PROCEDURE — 97140 MANUAL THERAPY 1/> REGIONS: CPT | Mod: GP | Performed by: PHYSICAL THERAPIST

## 2025-01-06 NOTE — PROGRESS NOTES
01/06/25 0500   Appointment Info   Signing clinician's name / credentials Lizzeth Oneal PT   Total/Authorized Visits E&T: 8 estimated   Visits Used 6   Medical Diagnosis Chronic left shoulder pain   PT Tx Diagnosis Chronic left shoulder pain   Progress Note/Certification   Start of Care Date 10/16/24   Onset of illness/injury or Date of Surgery 09/24/24   Therapy Frequency 1x/week, decreasing to every other as appropriate   Predicted Duration 12 weeks   Certification date from 01/09/25   Certification date to 04/03/25   Progress Note Due Date 12/15/24   Progress Note Completed Date 10/16/24   GOALS   PT Goals 2;3   PT Goal 1   Goal Identifier Reaching   Goal Description Pt will be able to reach to at least 130 degrees without a significant increase in pain in order to improve overall function   Rationale to maximize safety and independence with performance of ADLs and functional tasks;to maximize safety and independence within the home;to maximize safety and independence with self cares   Goal Progress AROM L Shldr Flx: 127; still pain end range with reaching;   Target Date 04/03/25   PT Goal 2   Goal Identifier Reaching behind back   Goal Description Pt will be able to reach behind back without a significant increase in pain in order to dress/undress   Rationale to maximize safety and independence with self cares;to maximize safety and independence with performance of ADLs and functional tasks   Goal Progress L4, still pain with this   Target Date 04/03/25   PT Goal 3   Goal Identifier Cross body   Goal Description Pt will be able to lift at least 3 pounds across body without being limited by pain in order to improve ability to blow dry hair   Rationale to maximize safety and independence with performance of ADLs and functional tasks;to maximize safety and independence with self cares   Goal Progress unable, still pain with lifting and reaching across body   Target Date 04/03/25   Subjective Report   Subjective  Report States does feel like has improved overall, but does feel like in last month has hit a plateau. Does feel like movement is better,  but pain levels are about the same. Therapy not as consistent in past month, so feels that may have contributed some too.   Objective Measures   Objective Measures Objective Measure 1;Objective Measure 2   Objective Measure 1   Objective Measure AROM L Shldr   Details Flx: 127, Abd: 110 (w/ less scaption), Ext/IR: L4,   Objective Measure 2   Objective Measure PROM L Shldr   Details Flx: 140, Abd: 120, ER: 35* @ 65*   Treatment Interventions (PT)   Interventions Therapeutic Procedure/Exercise;Manual Therapy   Therapeutic Procedure/Exercise   Therapeutic Procedures: strength, endurance, ROM, flexibility minutes (09080) 30   Therapeutic Procedures Ther Proc 2   Ther Proc 1 UBE   Ther Proc 1 - Details NT   Ther Proc 2 PROM L Shldr   Ther Proc 2 - Details Pt in supine, all directions to tolerance   PTRx Ther Proc 1 Standing Passive Shoulder Flexion   PTRx Ther Proc 1 - Details 10 sec x 10 reps   PTRx Ther Proc 2 Four Corner Stretch External Rotation at Side   PTRx Ther Proc 2 - Details x 20 sec x 2   PTRx Ther Proc 3 Side-lying Posterior Capsule Stretch   PTRx Ther Proc 3 - Details 15-20 sec x 5; reports able to hold longer with HEP   PTRx Ther Proc 5 Towel Roll Stretch   PTRx Ther Proc 5 - Details NT   PTRx Ther Proc 6 Wand Shoulder Extension Standing   PTRx Ther Proc 6 - Details brief review   PTRx Ther Proc 7 Passive Shoulder Abduction   PTRx Ther Proc 8 Scapular Retraction/Depression   PTRx Ther Proc 8 - Details x 5, work in throughout the day   PTRx Ther Proc 9 Pendulum/Codmans   PTRx Ther Proc 9 - Details 1 min; discussed could try these at end of exercise session if some soreness   Skilled Intervention Progression of HEP for ROM and scap strengthening - see above   Patient Response/Progress ERP w/ all exercises but tolerable   Manual Therapy   Manual Therapy: Mobilization, MFR,  MLD, friction massage minutes (75241) 10   Manual Therapy 1 Joint Mobilization   Manual Therapy 1 - Details Pt in supine, generalize L GH oscillatory distraction, grade II for pain relief during PROM   Patient Response/Progress reports relief   Education   Learner/Method Patient   Plan   Home program See PTRX   Updates to plan of care PN and recert completed, goals ongoing and appropriate, timeframes adjusted. Continue to work on progressing stretching, strengthening, manual therapy as able .Pt would like to try to continue with therapy, has follow up with MD end of March and pt would like to  reassess at that time   Plan for next session four corner stretching, PROM w/ joint mobs   Total Session Time   Timed Code Treatment Minutes 40   Total Treatment Time (sum of timed and untimed services) 40       [facility]                                                                                   OUTPATIENT PHYSICAL THERAPY    PLAN OF TREATMENT FOR OUTPATIENT REHABILITATION   Patient's Last Name, First Name, REGINAAydeeVICTOR HUGOAydee  Karen Li YOB: 1950   Provider's Name   [facility]   Medical Record No.  [id number]     Onset Date: 09/24/24  Start of Care Date: 10/16/24     Medical Diagnosis:  Chronic left shoulder pain      PT Treatment Diagnosis:  Chronic left shoulder pain Plan of Treatment  Frequency/Duration: 1x/week, decreasing to every other as appropriate/ 12 weeks    Certification date from 01/09/25 to 04/03/25         See note for plan of treatment details and functional goals     Lizzeth Oneal, PT                         I CERTIFY THE NEED FOR THESE SERVICES FURNISHED UNDER        THIS PLAN OF TREATMENT AND WHILE UNDER MY CARE     (Physician attestation of this document indicates review and certification of the therapy plan).              Referring Provider:  Yanick Salazar    Initial Assessment  See Epic Evaluation- Start of Care Date:  10/16/24            PLAN  Continue therapy per current plan of care.    Beginning/End Dates of Progress Note Reporting Period:  10/16/24 to 01/06/2025    Referring Provider:  Yanick Salazar

## 2025-01-08 DIAGNOSIS — E78.00 PURE HYPERCHOLESTEROLEMIA: ICD-10-CM

## 2025-01-08 RX ORDER — ATORVASTATIN CALCIUM 80 MG/1
TABLET, FILM COATED ORAL
Qty: 90 TABLET | Refills: 1 | Status: SHIPPED | OUTPATIENT
Start: 2025-01-08

## 2025-01-27 ENCOUNTER — THERAPY VISIT (OUTPATIENT)
Dept: PHYSICAL THERAPY | Facility: CLINIC | Age: 75
End: 2025-01-27
Payer: MEDICARE

## 2025-01-27 DIAGNOSIS — M25.512 CHRONIC LEFT SHOULDER PAIN: Primary | ICD-10-CM

## 2025-01-27 DIAGNOSIS — G89.29 CHRONIC LEFT SHOULDER PAIN: Primary | ICD-10-CM

## 2025-01-27 PROCEDURE — 97110 THERAPEUTIC EXERCISES: CPT | Mod: GP | Performed by: PHYSICAL THERAPIST

## 2025-01-27 PROCEDURE — 97112 NEUROMUSCULAR REEDUCATION: CPT | Mod: GP | Performed by: PHYSICAL THERAPIST

## 2025-02-10 DIAGNOSIS — I10 ESSENTIAL HYPERTENSION: ICD-10-CM

## 2025-02-10 RX ORDER — HYDROCHLOROTHIAZIDE 12.5 MG/1
12.5 TABLET ORAL DAILY
Qty: 90 TABLET | Refills: 3 | Status: SHIPPED | OUTPATIENT
Start: 2025-02-10

## 2025-02-18 ENCOUNTER — THERAPY VISIT (OUTPATIENT)
Dept: PHYSICAL THERAPY | Facility: CLINIC | Age: 75
End: 2025-02-18
Payer: MEDICARE

## 2025-02-18 DIAGNOSIS — M25.512 CHRONIC LEFT SHOULDER PAIN: Primary | ICD-10-CM

## 2025-02-18 DIAGNOSIS — G89.29 CHRONIC LEFT SHOULDER PAIN: Primary | ICD-10-CM

## 2025-02-18 PROCEDURE — 97110 THERAPEUTIC EXERCISES: CPT | Mod: GP | Performed by: PHYSICAL THERAPIST

## 2025-02-18 PROCEDURE — 97112 NEUROMUSCULAR REEDUCATION: CPT | Mod: GP | Performed by: PHYSICAL THERAPIST

## 2025-03-03 ENCOUNTER — THERAPY VISIT (OUTPATIENT)
Dept: PHYSICAL THERAPY | Facility: CLINIC | Age: 75
End: 2025-03-03
Payer: MEDICARE

## 2025-03-03 DIAGNOSIS — G89.29 CHRONIC LEFT SHOULDER PAIN: Primary | ICD-10-CM

## 2025-03-03 DIAGNOSIS — M25.512 CHRONIC LEFT SHOULDER PAIN: Primary | ICD-10-CM

## 2025-03-03 PROCEDURE — 97110 THERAPEUTIC EXERCISES: CPT | Mod: GP | Performed by: PHYSICAL THERAPIST

## 2025-03-03 PROCEDURE — 97112 NEUROMUSCULAR REEDUCATION: CPT | Mod: GP | Performed by: PHYSICAL THERAPIST

## 2025-03-03 NOTE — PROGRESS NOTES
03/03/25 0500   Appointment Info   Signing clinician's name / credentials Lizzeth Oneal PT   Total/Authorized Visits E&T: 6 + 8 for 14 total   Visits Used 9   Medical Diagnosis Chronic left shoulder pain   PT Tx Diagnosis Chronic left shoulder pain   Progress Note/Certification   Start of Care Date 10/16/24   Onset of illness/injury or Date of Surgery 09/24/24   Therapy Frequency 1x/week, decreasing to every other as appropriate   Predicted Duration 12 weeks   Certification date from 01/09/25   Certification date to 04/03/25   Progress Note Due Date 03/03/25   Progress Note Completed Date 01/06/25   GOALS   PT Goals 2;3   PT Goal 1   Goal Identifier Reaching   Goal Description Pt will be able to reach to at least 130 degrees without a significant increase in pain in order to improve overall function   Rationale to maximize safety and independence with performance of ADLs and functional tasks;to maximize safety and independence within the home;to maximize safety and independence with self cares   Goal Progress AROM L Shldr Flx: 133; still pain end range with reaching;   Target Date 04/03/25   PT Goal 2   Goal Identifier Reaching behind back   Goal Description Pt will be able to reach behind back without a significant increase in pain in order to dress/undress   Rationale to maximize safety and independence with self cares;to maximize safety and independence with performance of ADLs and functional tasks   Goal Progress L4, still pain with this   Target Date 04/03/25   PT Goal 3   Goal Identifier Cross body   Goal Description Pt will be able to lift at least 3 pounds across body without being limited by pain in order to improve ability to blow dry hair   Rationale to maximize safety and independence with performance of ADLs and functional tasks;to maximize safety and independence with self cares   Goal Progress unable, still pain with lifting and reaching across body   Target Date 04/03/25   Subjective Report    Subjective Report States about the same since last visit, but overall does feel better. Still gets some pain when reaching into certain positions such as getting seat belt on; reaching behind back and overhead. States the sharp pain is less frequent and also doesn't last as long when does occur. Overall does think there is progress, but is slow.   Objective Measures   Objective Measures Objective Measure 1;Objective Measure 2   Objective Measure 1   Objective Measure AROM L Shldr   Details Flx: 132, Abd: 120 (w/ less scaption), Ext/IR: L4,   Objective Measure 2   Objective Measure PROM L Shldr   Details Flx: 140, Abd: 120, ER: 35* @ 65*   Treatment Interventions (PT)   Interventions Therapeutic Procedure/Exercise;Manual Therapy;Neuromuscular Re-education   Therapeutic Procedure/Exercise   Therapeutic Procedures: strength, endurance, ROM, flexibility minutes (45793) 25   Therapeutic Procedures Ther Proc 2   Ther Proc 2 PROM L Shldr   Ther Proc 2 - Details Pt in supine, all directions to tolerance   PTRx Ther Proc 1 Standing Passive Shoulder Flexion   PTRx Ther Proc 1 - Details 20 sec x 5 reps   PTRx Ther Proc 2 Passive Shoulder Abduction   PTRx Ther Proc 2 - Details standing at counter 15-20 sec x 5 reps   PTRx Ther Proc 3 Four Corner Stretch External Rotation With Abduction   PTRx Ther Proc 3 - Details x 20 sec hold cueing for good starting position added to HEP   PTRx Ther Proc 4 Four Corner Stretch Passive Shoulder Abduction With Trunk Rotation   PTRx Ther Proc 4 - Details left x 20 sec x 2 able to do with elbow closer to shoulder height today without increased pain   PTRx Ther Proc 5 Side-lying Posterior Capsule Stretch   PTRx Ther Proc 5 - Details 20 sec x 5reps   PTRx Ther Proc 6 Wand Shoulder Extension Standing   PTRx Ther Proc 6 - Details brief review can continue with HEP   PTRx Ther Proc 7 Pendulum/Codmans   PTRx Ther Proc 7 - Details brief review HEP   PTRx Ther Proc 8 Wand Shoulder Internal Rotation    PTRx Ther Proc 8 - Details started with just bringing arm across back then able to do with lifting up slightly; 20 sec hold x 3 added to HEP   Skilled Intervention Verbal cues and demonstration used for proper performance of exercise including cues for proper form to decrease substitutions and isolate muscle being targeted; cues for slow and controlled motion both concentrically and eccentrically; cueing for good starting position and posture, and to ensure safe performance of motion   Patient Response/Progress ERP w/ all stretching exercises but tolerable   Neuromuscular Re-education   Neuromuscular re-ed of mvmt, balance, coord, kinesthetic sense, posture, proprioception minutes (35600) 8   PTRx Neuro Re-ed 1 Scapular Retraction/Depression   PTRx Neuro Re-ed 1 - Details brief review has been doing these throughout the day   PTRx Neuro Re-ed 2 Shoulder Theraband Rows   PTRx Neuro Re-ed 2 - Details red x 10 x 2 good form can progress to red with HEP   PTRx Neuro Re-ed 3 Shoulder Extension in Standing   PTRx Neuro Re-ed 3 - Details x 10 cueing initially for scap retraction with depression then able to do with good form. added to HEP can add soup can or water bottle if gets easier   Skilled Intervention Verbal cues and demonstration used for proper performance of exercise including cues for proper form to decrease substitutions and isolate muscle being targeted; cues for slow and controlled motion both concentrically and eccentrically; cueing for good starting position and posture, and to ensure safe performance of motion   Patient Response/Progress good, no pain with these exercises   Manual Therapy   Manual Therapy: Mobilization, MFR, MLD, friction massage minutes (46523) 5   Manual Therapy 1 Joint Mobilization   Manual Therapy 1 - Details posterior and inferior glides; and general oscillation/distraction also during PROM   Patient Response/Progress good, tolerates well   Education   Learner/Method Patient    Education Comments prefers handouts for HEP   Plan   Home program See PTRX   Updates to plan of care Progress noted completed, continue per plan. Has follow up with primary 3/25/2025, may benefit from referral to orthopedics/sports med if not improving.   Plan for next session four corner stretching, PROM w/ joint mobs; try standing ER with scap retraction   Total Session Time   Timed Code Treatment Minutes 38   Total Treatment Time (sum of timed and untimed services) 38       PLAN  Continue therapy per current plan of care.    Beginning/End Dates of Progress Note Reporting Period:  01/06/25 to 03/03/2025    Referring Provider:  Yanick Salazar

## 2025-03-25 ENCOUNTER — OFFICE VISIT (OUTPATIENT)
Dept: INTERNAL MEDICINE | Facility: CLINIC | Age: 75
End: 2025-03-25
Payer: MEDICARE

## 2025-03-25 VITALS
RESPIRATION RATE: 16 BRPM | OXYGEN SATURATION: 98 % | HEART RATE: 68 BPM | TEMPERATURE: 97.3 F | SYSTOLIC BLOOD PRESSURE: 142 MMHG | DIASTOLIC BLOOD PRESSURE: 78 MMHG | WEIGHT: 181 LBS | BODY MASS INDEX: 29.09 KG/M2 | HEIGHT: 66 IN

## 2025-03-25 DIAGNOSIS — E78.00 HYPERCHOLESTEROLEMIA: ICD-10-CM

## 2025-03-25 DIAGNOSIS — I48.91 ATRIAL FIBRILLATION, UNSPECIFIED TYPE (H): ICD-10-CM

## 2025-03-25 DIAGNOSIS — Z51.81 ENCOUNTER FOR THERAPEUTIC DRUG MONITORING: ICD-10-CM

## 2025-03-25 DIAGNOSIS — I10 ESSENTIAL HYPERTENSION: ICD-10-CM

## 2025-03-25 DIAGNOSIS — I48.20 CHRONIC ATRIAL FIBRILLATION (H): ICD-10-CM

## 2025-03-25 DIAGNOSIS — Z85.828 HISTORY OF SCC (SQUAMOUS CELL CARCINOMA) OF SKIN: ICD-10-CM

## 2025-03-25 DIAGNOSIS — E11.9 TYPE 2 DIABETES MELLITUS WITHOUT COMPLICATION, WITHOUT LONG-TERM CURRENT USE OF INSULIN (H): Primary | ICD-10-CM

## 2025-03-25 DIAGNOSIS — I34.0 MITRAL VALVE INSUFFICIENCY, UNSPECIFIED ETIOLOGY: ICD-10-CM

## 2025-03-25 LAB
ALBUMIN SERPL BCG-MCNC: 4.6 G/DL (ref 3.5–5.2)
ALP SERPL-CCNC: 69 U/L (ref 40–150)
ALT SERPL W P-5'-P-CCNC: 32 U/L (ref 0–50)
ANION GAP SERPL CALCULATED.3IONS-SCNC: 13 MMOL/L (ref 7–15)
AST SERPL W P-5'-P-CCNC: 39 U/L (ref 0–45)
BILIRUB SERPL-MCNC: 0.5 MG/DL
BUN SERPL-MCNC: 23.5 MG/DL (ref 8–23)
CALCIUM SERPL-MCNC: 10.1 MG/DL (ref 8.8–10.4)
CHLORIDE SERPL-SCNC: 100 MMOL/L (ref 98–107)
CREAT SERPL-MCNC: 0.92 MG/DL (ref 0.51–0.95)
EGFRCR SERPLBLD CKD-EPI 2021: 64 ML/MIN/1.73M2
ERYTHROCYTE [DISTWIDTH] IN BLOOD BY AUTOMATED COUNT: 12.9 % (ref 10–15)
EST. AVERAGE GLUCOSE BLD GHB EST-MCNC: 137 MG/DL
GLUCOSE SERPL-MCNC: 114 MG/DL (ref 70–99)
HBA1C MFR BLD: 6.4 % (ref 0–5.6)
HCO3 SERPL-SCNC: 27 MMOL/L (ref 22–29)
HCT VFR BLD AUTO: 39.9 % (ref 35–47)
HGB BLD-MCNC: 13.3 G/DL (ref 11.7–15.7)
MCH RBC QN AUTO: 34.7 PG (ref 26.5–33)
MCHC RBC AUTO-ENTMCNC: 33.3 G/DL (ref 31.5–36.5)
MCV RBC AUTO: 104 FL (ref 78–100)
PLATELET # BLD AUTO: 216 10E3/UL (ref 150–450)
POTASSIUM SERPL-SCNC: 3.9 MMOL/L (ref 3.4–5.3)
PROT SERPL-MCNC: 7.8 G/DL (ref 6.4–8.3)
RBC # BLD AUTO: 3.83 10E6/UL (ref 3.8–5.2)
SODIUM SERPL-SCNC: 140 MMOL/L (ref 135–145)
WBC # BLD AUTO: 7.6 10E3/UL (ref 4–11)

## 2025-03-25 PROCEDURE — 80053 COMPREHEN METABOLIC PANEL: CPT | Performed by: INTERNAL MEDICINE

## 2025-03-25 PROCEDURE — 3078F DIAST BP <80 MM HG: CPT | Performed by: INTERNAL MEDICINE

## 2025-03-25 PROCEDURE — 83036 HEMOGLOBIN GLYCOSYLATED A1C: CPT | Performed by: INTERNAL MEDICINE

## 2025-03-25 PROCEDURE — 3077F SYST BP >= 140 MM HG: CPT | Performed by: INTERNAL MEDICINE

## 2025-03-25 PROCEDURE — 99214 OFFICE O/P EST MOD 30 MIN: CPT | Performed by: INTERNAL MEDICINE

## 2025-03-25 PROCEDURE — 85027 COMPLETE CBC AUTOMATED: CPT | Performed by: INTERNAL MEDICINE

## 2025-03-25 PROCEDURE — 36415 COLL VENOUS BLD VENIPUNCTURE: CPT | Performed by: INTERNAL MEDICINE

## 2025-03-25 PROCEDURE — G2211 COMPLEX E/M VISIT ADD ON: HCPCS | Performed by: INTERNAL MEDICINE

## 2025-03-25 NOTE — PROGRESS NOTES
Karen Li   74 year old female    Date of Visit: 3/25/2025    Chief Complaint   Patient presents with    Follow Up     6 mo follow up     Subjective  74-year-old female,  September 2023.  Living home on her own.  She is walking the dogs regularly.  She is cut down on the wine considerably and just drinks occasional wine in the weekends trying to limit to 1 glass or less.    She had new onset atrial fibrillation after an episode of COVID May 2024.  She is now rate controlled strategy and asymptomatic.  No bleeding issues on the Eliquis.  Toprol-XL 50 mg a day.  No history of stroke.  No falls.    May 2024 heart echo with ejection fraction 50-55% with mild to moderate mitral regurgitation.    Blood pressure has been in the 130-140s/70s.  Denies orthostasis.  On losartan 100 mg a day, HCTZ 12.5 mg a day, Toprol-XL 50 mg a day and amlodipine 2.5 mg a day.    No edema.    Coronary calcium score of 255 but no event.  2019 ultrasound carotids with just mild plaque.    Tolerating atorvastatin 80 mg.  LDL was 92 last September.    She is never smoked.    Diabetes is diet controlled and she does not check blood sugars.  Hemoglobin A1c level was 6.4% last September.    She has some chronic left shoulder tendinitis which is significantly better and essentially no pain now after physical therapy and she is doing his regular exercises now.    History of hysterectomy but has a residual cervix and does colposcopies regularly with gynecology.  No new vaginal bleeding.    March 2024 colonoscopy was negative.  No further planned.    No new skin lesions.  She usually sees dermatology in the spring history of squamous cell cancer.    February 2024 DEXA scan normal.    Glaucoma suspect and sees ophthalmology twice a year, just seen recently this winter.        Glaucoma suspect and sees ophthalmology twice a year, just seen recently this winter.    April 3 mammogram scheduled, no new breast complaints.    She reports her  "diet is good.  Eating a lot of salmon and broccoli.    PMHx:  No past medical history on file.  PSHx:    Past Surgical History:   Procedure Laterality Date    ANKLE SURGERY Left 2002    CATARACT EXTRACTION      HYSTERECTOMY      HYSTERECTOMY TOTAL ABDOMINAL, BILATERAL SALPINGO-OOPHORECTOMY, COMBINED Bilateral 1992     Immunizations:   Immunization History   Administered Date(s) Administered    COVID-19 12+ (Pfizer) 01/04/2024, 09/24/2024    COVID-19 Bivalent 12+ (Pfizer) 10/12/2022    COVID-19 MONOVALENT 12+ (Pfizer) 11/02/2021    COVID-19 Vaccine ([place]) 04/09/2021    Flu, Unspecified 10/25/2007    Influenza (High Dose) Trivalent,PF (Fluzone) 09/24/2015, 01/24/2017, 02/01/2018, 09/11/2018, 11/19/2019, 09/24/2024    Influenza (IIV3) PF 10/25/2007, 01/27/2009    Influenza (prior to 2024) 12/08/2009, 03/15/2011, 09/05/2012    Influenza Vaccine 65+ (Fluzone HD) 01/05/2021, 10/12/2022, 12/05/2023    Influenza Vaccine, 6+MO IM (QUADRIVALENT W/PRESERVATIVES) 01/27/2009, 12/08/2009    Pneumo Conj 13-V (2010&after) 09/24/2015    Pneumococcal 23 valent 01/24/2017    Rabies Immune Globulin 07/12/2013    Rabies Vaccine 07/12/2013, 07/15/2013, 07/19/2013, 07/26/2013    Rabies, Unspecified 07/12/2013, 07/15/2013, 07/19/2013, 07/26/2013    TDAP (Adacel,Boostrix) 07/02/2007, 01/24/2017    Td (Adult), Adsorbed 08/05/1994       ROS A comprehensive review of systems was performed and was otherwise negative    Medications, allergies, and problem list were reviewed and updated    Exam  BP (!) 142/78   Pulse 68   Temp 97.3  F (36.3  C)   Resp 16   Ht 1.674 m (5' 5.9\")   Wt 82.1 kg (181 lb)   LMP  (LMP Unknown)   SpO2 98%   BMI 29.30 kg/m    Appears well.  Good mood and affect.  Mobility normal and can climb up the exam table without difficulty.  Lungs are clear to auscultation.  Heart is irregularly irregular but slow.  I did not hear murmur.  There is no ankle edema and abdomen is nontender    Assessment/Plan  1. Type 2 " diabetes mellitus without complication, without long-term current use of insulin (H) (Primary)  Diet controlled.  Continue regular exercise and healthy diet.  - Hemoglobin A1c    Glaucoma suspect, see ophthalmology every 6 months    2. Essential hypertension  Borderline systolic and overall controlled.  Continue current medications.  Could consider increase of amlodipine if needed.    3. Chronic atrial fibrillation (H)  Rate controlled with metoprolol.  Rate control strategy.  Eliquis long-term.  She requested a 90-day prescription for Eliquis    4. Hypercholesterolemia  LDL adequately controlled last September.  Lipitor 80 mg.  Positive coronary calcium score    5. Encounter for therapeutic drug monitoring    - Comprehensive metabolic panel  - CBC with platelets    6. History of SCC (squamous cell carcinoma) of skin  No evidence of recurrence.  Follow-up in the spring with dermatology    7. Mitral valve insufficiency, unspecified etiology  Does not have a mild murmur.  Repeat heart echo to make sure her mitral valve regurgitation is not worsening.  But if no worsening mitral regurgitation or just mild regurgitation and normal ejection fraction, I would not anticipate further regular follow-up for this.  - Echocardiogram Complete; Future    8. Atrial fibrillation, unspecified type (H)  Rate control.  - apixaban ANTICOAGULANT (ELIQUIS ANTICOAGULANT) 5 MG tablet; Take 1 tablet (5 mg) by mouth 2 times daily.  Dispense: 180 tablet; Refill: 3    Follows up with gynecology regularly for her cervix issue.    The longitudinal plan of care for the diagnosis(es)/condition(s) as documented were addressed during this visit. Due to the added complexity in care, I will continue to support Karen in the subsequent management and with ongoing continuity of care.        Return in about 6 months (around 9/25/2025) for Annual wellness visit.   Patient Instructions   No change in medication treatment.    Schedule a heart echo later this  spring to follow-up on the mildly leaky mitral valve.  Results of the echo will be mailed to you in the letter.    Goal blood pressure less than 135/85 most of the time.  Contact me if your blood pressure is running higher than that more than occasionally.    See me this fall for annual wellness visit.      Yanick Salazar MD, MD        Current Outpatient Medications   Medication Sig Dispense Refill    amLODIPine (NORVASC) 2.5 MG tablet Take 1 tablet (2.5 mg) by mouth daily. Take 2.5mg (1/2 tab) daily (Patient taking differently: Take 2.5 mg by mouth daily. Take 2.5mg) 90 tablet 3    apixaban ANTICOAGULANT (ELIQUIS ANTICOAGULANT) 5 MG tablet Take 1 tablet (5 mg) by mouth 2 times daily. 180 tablet 3    atorvastatin (LIPITOR) 80 MG tablet [ATORVASTATIN (LIPITOR) 80 MG TABLET] TAKE 1 TABLET BY MOUTH EVERYDAY AT BEDTIME 90 tablet 1    calcium carbonate-vitamin D3 600 mg(1,500mg) -400 unit Chew [CALCIUM CARBONATE-VITAMIN D3 600 MG(1,500MG) -400 UNIT CHEW] Chew 1 tablet daily.             hydroCHLOROthiazide 12.5 MG tablet TAKE 1 TABLET BY MOUTH EVERY DAY 90 tablet 3    losartan (COZAAR) 100 MG tablet TAKE 1 TABLET BY MOUTH EVERY DAY 90 tablet 2    metoprolol succinate ER (TOPROL XL) 50 MG 24 hr tablet Take 1 tablet (50 mg) by mouth daily 90 tablet 3    MULTIVITAMIN ORAL [MULTIVITAMIN ORAL] Take 1 tablet by mouth daily.       No Known Allergies  Social History     Tobacco Use    Smoking status: Never     Passive exposure: Never    Smokeless tobacco: Never   Vaping Use    Vaping status: Never Used   Substance Use Topics    Alcohol use: Not Currently     Comment: Alcoholic Drinks/day: wine 2-3 glasses of wine a day    Drug use: Never             Subjective   Karen is a 74 year old, presenting for the following health issues:  Follow Up (6 mo follow up)        3/25/2025    11:12 AM   Additional Questions   Roomed by Sofía BURDICK   Accompanied by zaira BRUNER                    Objective    BP (!) 142/78   Pulse 68   Temp 97.3  F  "(36.3  C)   Resp 16   Ht 1.674 m (5' 5.9\")   Wt 82.1 kg (181 lb)   LMP  (LMP Unknown)   SpO2 98%   BMI 29.30 kg/m    Body mass index is 29.3 kg/m .  Physical Exam               Signed Electronically by: Yanick Salazar MD    "

## 2025-03-25 NOTE — PATIENT INSTRUCTIONS
No change in medication treatment.    Schedule a heart echo later this spring to follow-up on the mildly leaky mitral valve.  Results of the echo will be mailed to you in the letter.    Goal blood pressure less than 135/85 most of the time.  Contact me if your blood pressure is running higher than that more than occasionally.    See me this fall for annual wellness visit.

## 2025-03-25 NOTE — LETTER
March 26, 2025      Karen Li  [address]  [place] [address]        Dear ,    We are writing to inform you of your test results.    Hemoglobin A1c level of 6.4% still shows good control of diabetes.  Still diet controlled.    Kidney labs are normal.  Urea nitrogen level is okay.    Liver tests are normal.    Hemoglobin level is normal.    Labs look good.  No change in treatment plan.    Resulted Orders   Hemoglobin A1c   Result Value Ref Range    Estimated Average Glucose 137 (H) <117 mg/dL    Hemoglobin A1C 6.4 (H) 0.0 - 5.6 %      Comment:      Normal <5.7%   Prediabetes 5.7-6.4%    Diabetes 6.5% or higher     Note: Adopted from ADA consensus guidelines.   Comprehensive metabolic panel   Result Value Ref Range    Sodium 140 135 - 145 mmol/L    Potassium 3.9 3.4 - 5.3 mmol/L    Carbon Dioxide (CO2) 27 22 - 29 mmol/L    Anion Gap 13 7 - 15 mmol/L    Urea Nitrogen 23.5 (H) 8.0 - 23.0 mg/dL    Creatinine 0.92 0.51 - 0.95 mg/dL    GFR Estimate 64 >60 mL/min/1.73m2      Comment:      eGFR calculated using 2021 CKD-EPI equation.    Calcium 10.1 8.8 - 10.4 mg/dL    Chloride 100 98 - 107 mmol/L    Glucose 114 (H) 70 - 99 mg/dL    Alkaline Phosphatase 69 40 - 150 U/L    AST 39 0 - 45 U/L    ALT 32 0 - 50 U/L    Protein Total 7.8 6.4 - 8.3 g/dL    Albumin 4.6 3.5 - 5.2 g/dL    Bilirubin Total 0.5 <=1.2 mg/dL   CBC with platelets   Result Value Ref Range    WBC Count 7.6 4.0 - 11.0 10e3/uL    RBC Count 3.83 3.80 - 5.20 10e6/uL    Hemoglobin 13.3 11.7 - 15.7 g/dL    Hematocrit 39.9 35.0 - 47.0 %     (H) 78 - 100 fL    MCH 34.7 (H) 26.5 - 33.0 pg    MCHC 33.3 31.5 - 36.5 g/dL    RDW 12.9 10.0 - 15.0 %    Platelet Count 216 150 - 450 10e3/uL       If you have any questions or concerns, please call the clinic at the number listed above.       Sincerely,      Yanick Salazar MD    Electronically signed

## 2025-04-01 PROBLEM — M25.512 CHRONIC LEFT SHOULDER PAIN: Status: RESOLVED | Noted: 2024-10-30 | Resolved: 2025-04-01

## 2025-04-01 PROBLEM — G89.29 CHRONIC LEFT SHOULDER PAIN: Status: RESOLVED | Noted: 2024-10-30 | Resolved: 2025-04-01

## 2025-04-03 ENCOUNTER — ANCILLARY PROCEDURE (OUTPATIENT)
Dept: MAMMOGRAPHY | Facility: CLINIC | Age: 75
End: 2025-04-03
Attending: INTERNAL MEDICINE
Payer: MEDICARE

## 2025-04-03 DIAGNOSIS — Z12.31 VISIT FOR SCREENING MAMMOGRAM: ICD-10-CM

## 2025-04-03 PROCEDURE — 77063 BREAST TOMOSYNTHESIS BI: CPT

## 2025-04-03 PROCEDURE — 77067 SCR MAMMO BI INCL CAD: CPT

## 2025-04-14 ENCOUNTER — HOSPITAL ENCOUNTER (OUTPATIENT)
Dept: CARDIOLOGY | Facility: CLINIC | Age: 75
Discharge: HOME | End: 2025-04-14
Attending: INTERNAL MEDICINE | Admitting: INTERNAL MEDICINE
Payer: MEDICARE

## 2025-04-14 DIAGNOSIS — I34.0 MITRAL VALVE INSUFFICIENCY, UNSPECIFIED ETIOLOGY: ICD-10-CM

## 2025-04-14 LAB — LVEF ECHO: NORMAL

## 2025-04-14 PROCEDURE — 93306 TTE W/DOPPLER COMPLETE: CPT

## 2025-04-14 PROCEDURE — 93306 TTE W/DOPPLER COMPLETE: CPT | Mod: 26 | Performed by: INTERNAL MEDICINE

## 2025-05-28 DIAGNOSIS — I10 ESSENTIAL HYPERTENSION: ICD-10-CM

## 2025-05-28 RX ORDER — LOSARTAN POTASSIUM 100 MG/1
100 TABLET ORAL
Qty: 90 TABLET | Refills: 2 | Status: SHIPPED | OUTPATIENT
Start: 2025-05-28

## 2025-07-04 DIAGNOSIS — E78.00 PURE HYPERCHOLESTEROLEMIA: ICD-10-CM

## 2025-07-07 DIAGNOSIS — I10 ESSENTIAL HYPERTENSION: ICD-10-CM

## 2025-07-07 RX ORDER — ATORVASTATIN CALCIUM 80 MG/1
TABLET, FILM COATED ORAL
Qty: 90 TABLET | Refills: 1 | Status: SHIPPED | OUTPATIENT
Start: 2025-07-07

## 2025-07-07 RX ORDER — AMLODIPINE BESYLATE 2.5 MG/1
2.5 TABLET ORAL DAILY
Qty: 90 TABLET | Refills: 3 | Status: SHIPPED | OUTPATIENT
Start: 2025-07-07

## 2025-09-02 DIAGNOSIS — I48.20 CHRONIC ATRIAL FIBRILLATION (H): ICD-10-CM

## 2025-09-02 RX ORDER — METOPROLOL SUCCINATE 50 MG/1
50 TABLET, EXTENDED RELEASE ORAL DAILY
Qty: 30 TABLET | Refills: 0 | Status: SHIPPED | OUTPATIENT
Start: 2025-09-02